# Patient Record
Sex: FEMALE | Race: WHITE | NOT HISPANIC OR LATINO | Employment: OTHER | URBAN - METROPOLITAN AREA
[De-identification: names, ages, dates, MRNs, and addresses within clinical notes are randomized per-mention and may not be internally consistent; named-entity substitution may affect disease eponyms.]

---

## 2024-07-03 ENCOUNTER — TRANSFERRED RECORDS (OUTPATIENT)
Dept: HEALTH INFORMATION MANAGEMENT | Facility: CLINIC | Age: 74
End: 2024-07-03
Payer: MEDICARE

## 2024-07-03 LAB
ALT SERPL-CCNC: 16 INTERNATIONAL_UNITS/L (ref 7–52)
AST SERPL-CCNC: 13 INTERNATIONAL_UNITS/L (ref 13–39)
CREATININE (EXTERNAL): 0.78 MG/DL (ref 0.6–1.2)
GLUCOSE (EXTERNAL): 449 MG/DL (ref 70–105)
INR (EXTERNAL): 1.2 (ref 0.8–1.1)
POTASSIUM (EXTERNAL): 4 MEQ/L (ref 3.5–5.1)

## 2024-07-20 ENCOUNTER — TRANSFERRED RECORDS (OUTPATIENT)
Dept: HEALTH INFORMATION MANAGEMENT | Facility: CLINIC | Age: 74
End: 2024-07-20
Payer: MEDICARE

## 2024-07-21 ENCOUNTER — APPOINTMENT (OUTPATIENT)
Dept: INTERVENTIONAL RADIOLOGY/VASCULAR | Facility: CLINIC | Age: 74
DRG: 271 | End: 2024-07-21
Attending: SURGERY
Payer: MEDICARE

## 2024-07-21 ENCOUNTER — ANESTHESIA EVENT (OUTPATIENT)
Dept: SURGERY | Facility: CLINIC | Age: 74
DRG: 271 | End: 2024-07-21
Payer: MEDICARE

## 2024-07-21 ENCOUNTER — HOSPITAL ENCOUNTER (INPATIENT)
Facility: CLINIC | Age: 74
LOS: 9 days | Discharge: NURSING, REHAB OR LONG-TERM CARE | DRG: 271 | End: 2024-07-30
Attending: SURGERY | Admitting: SURGERY
Payer: MEDICARE

## 2024-07-21 ENCOUNTER — MEDICAL CORRESPONDENCE (OUTPATIENT)
Dept: HEALTH INFORMATION MANAGEMENT | Facility: CLINIC | Age: 74
End: 2024-07-21

## 2024-07-21 ENCOUNTER — ANESTHESIA (OUTPATIENT)
Dept: SURGERY | Facility: CLINIC | Age: 74
DRG: 271 | End: 2024-07-21
Payer: MEDICARE

## 2024-07-21 DIAGNOSIS — Z98.890 H/O FASCIOTOMY: Primary | ICD-10-CM

## 2024-07-21 DIAGNOSIS — I74.09: ICD-10-CM

## 2024-07-21 DIAGNOSIS — D62 ANEMIA DUE TO BLOOD LOSS, ACUTE: ICD-10-CM

## 2024-07-21 LAB
ABO/RH(D): NORMAL
ACT BLD: 211 SECONDS (ref 74–150)
ACT BLD: 224 SECONDS (ref 74–150)
ACT BLD: 228 SECONDS (ref 74–150)
ACT BLD: 236 SECONDS (ref 74–150)
ACT BLD: 257 SECONDS (ref 74–150)
ACT BLD: 291 SECONDS (ref 74–150)
ALBUMIN SERPL BCG-MCNC: 2.9 G/DL (ref 3.5–5.2)
ALBUMIN SERPL BCG-MCNC: 3.1 G/DL (ref 3.5–5.2)
ALBUMIN UR-MCNC: 100 MG/DL
ALP SERPL-CCNC: 120 U/L (ref 40–150)
ALP SERPL-CCNC: 131 U/L (ref 40–150)
ALT SERPL W P-5'-P-CCNC: 16 U/L (ref 0–50)
ALT SERPL W P-5'-P-CCNC: 18 U/L (ref 0–50)
ANION GAP SERPL CALCULATED.3IONS-SCNC: 14 MMOL/L (ref 7–15)
ANION GAP SERPL CALCULATED.3IONS-SCNC: 9 MMOL/L (ref 7–15)
ANTIBODY SCREEN: NEGATIVE
APPEARANCE UR: ABNORMAL
APTT PPP: 176 SECONDS (ref 22–38)
APTT PPP: 79 SECONDS (ref 22–38)
APTT PPP: 81 SECONDS (ref 22–38)
AST SERPL W P-5'-P-CCNC: 17 U/L (ref 0–45)
AST SERPL W P-5'-P-CCNC: 24 U/L (ref 0–45)
BASE EXCESS BLDV CALC-SCNC: -5.1 MMOL/L (ref -3–3)
BILIRUB DIRECT SERPL-MCNC: <0.2 MG/DL (ref 0–0.3)
BILIRUB SERPL-MCNC: 0.2 MG/DL
BILIRUB SERPL-MCNC: 0.3 MG/DL
BILIRUB UR QL STRIP: NEGATIVE
BUN SERPL-MCNC: 11.2 MG/DL (ref 8–23)
BUN SERPL-MCNC: 7.4 MG/DL (ref 8–23)
CALCIUM SERPL-MCNC: 7.6 MG/DL (ref 8.8–10.4)
CALCIUM SERPL-MCNC: 8.2 MG/DL (ref 8.8–10.4)
CHLORIDE SERPL-SCNC: 97 MMOL/L (ref 98–107)
CHLORIDE SERPL-SCNC: 98 MMOL/L (ref 98–107)
COLOR UR AUTO: ABNORMAL
CREAT SERPL-MCNC: 0.59 MG/DL (ref 0.51–0.95)
CREAT SERPL-MCNC: 0.62 MG/DL (ref 0.51–0.95)
EGFRCR SERPLBLD CKD-EPI 2021: >90 ML/MIN/1.73M2
EGFRCR SERPLBLD CKD-EPI 2021: >90 ML/MIN/1.73M2
ERYTHROCYTE [DISTWIDTH] IN BLOOD BY AUTOMATED COUNT: 13.1 % (ref 10–15)
ERYTHROCYTE [DISTWIDTH] IN BLOOD BY AUTOMATED COUNT: 13.2 % (ref 10–15)
FIBRINOGEN PPP-MCNC: 380 MG/DL (ref 170–490)
GLUCOSE BLDC GLUCOMTR-MCNC: 143 MG/DL (ref 70–99)
GLUCOSE BLDC GLUCOMTR-MCNC: 256 MG/DL (ref 70–99)
GLUCOSE BLDC GLUCOMTR-MCNC: 289 MG/DL (ref 70–99)
GLUCOSE SERPL-MCNC: 148 MG/DL (ref 70–99)
GLUCOSE SERPL-MCNC: 280 MG/DL (ref 70–99)
GLUCOSE UR STRIP-MCNC: 500 MG/DL
HBA1C MFR BLD: 10.9 %
HCO3 BLDV-SCNC: 22 MMOL/L (ref 21–28)
HCO3 SERPL-SCNC: 22 MMOL/L (ref 22–29)
HCO3 SERPL-SCNC: 27 MMOL/L (ref 22–29)
HCT VFR BLD AUTO: 26.8 % (ref 35–47)
HCT VFR BLD AUTO: 27.5 % (ref 35–47)
HGB BLD-MCNC: 9.1 G/DL (ref 11.7–15.7)
HGB BLD-MCNC: 9.1 G/DL (ref 11.7–15.7)
HGB UR QL STRIP: ABNORMAL
INR PPP: 7.11 (ref 0.85–1.15)
KETONES UR STRIP-MCNC: 60 MG/DL
LACTATE SERPL-SCNC: 1.1 MMOL/L (ref 0.7–2)
LEUKOCYTE ESTERASE UR QL STRIP: ABNORMAL
MCH RBC QN AUTO: 32.2 PG (ref 26.5–33)
MCH RBC QN AUTO: 34 PG (ref 26.5–33)
MCHC RBC AUTO-ENTMCNC: 33.1 G/DL (ref 31.5–36.5)
MCHC RBC AUTO-ENTMCNC: 34 G/DL (ref 31.5–36.5)
MCV RBC AUTO: 100 FL (ref 78–100)
MCV RBC AUTO: 97 FL (ref 78–100)
NITRATE UR QL: NEGATIVE
O2/TOTAL GAS SETTING VFR VENT: 0 %
OXYHGB MFR BLDV: 65 % (ref 70–75)
PCO2 BLDV: 46 MM HG (ref 40–50)
PH BLDV: 7.28 [PH] (ref 7.32–7.43)
PH UR STRIP: 6 [PH] (ref 5–7)
PLATELET # BLD AUTO: 660 10E3/UL (ref 150–450)
PLATELET # BLD AUTO: 673 10E3/UL (ref 150–450)
PO2 BLDV: 40 MM HG (ref 25–47)
POTASSIUM SERPL-SCNC: 3.6 MMOL/L (ref 3.4–5.3)
POTASSIUM SERPL-SCNC: 4.1 MMOL/L (ref 3.4–5.3)
PROT SERPL-MCNC: 5.8 G/DL (ref 6.4–8.3)
PROT SERPL-MCNC: 6.4 G/DL (ref 6.4–8.3)
RBC # BLD AUTO: 2.68 10E6/UL (ref 3.8–5.2)
RBC # BLD AUTO: 2.83 10E6/UL (ref 3.8–5.2)
RBC URINE: >182 /HPF
SAO2 % BLDV: 66 % (ref 70–75)
SODIUM SERPL-SCNC: 133 MMOL/L (ref 135–145)
SODIUM SERPL-SCNC: 134 MMOL/L (ref 135–145)
SP GR UR STRIP: 1.01 (ref 1–1.03)
SPECIMEN EXPIRATION DATE: NORMAL
UROBILINOGEN UR STRIP-MCNC: NORMAL MG/DL
WBC # BLD AUTO: 10.4 10E3/UL (ref 4–11)
WBC # BLD AUTO: 21.2 10E3/UL (ref 4–11)
WBC URINE: >182 /HPF

## 2024-07-21 PROCEDURE — C1894 INTRO/SHEATH, NON-LASER: HCPCS | Performed by: SURGERY

## 2024-07-21 PROCEDURE — 87086 URINE CULTURE/COLONY COUNT: CPT

## 2024-07-21 PROCEDURE — 250N000011 HC RX IP 250 OP 636: Performed by: NURSE ANESTHETIST, CERTIFIED REGISTERED

## 2024-07-21 PROCEDURE — 83036 HEMOGLOBIN GLYCOSYLATED A1C: CPT

## 2024-07-21 PROCEDURE — 88311 DECALCIFY TISSUE: CPT | Mod: TC | Performed by: SURGERY

## 2024-07-21 PROCEDURE — 04CL0ZZ EXTIRPATION OF MATTER FROM LEFT FEMORAL ARTERY, OPEN APPROACH: ICD-10-PCS | Performed by: SURGERY

## 2024-07-21 PROCEDURE — 88304 TISSUE EXAM BY PATHOLOGIST: CPT | Mod: TC | Performed by: SURGERY

## 2024-07-21 PROCEDURE — 250N000011 HC RX IP 250 OP 636: Mod: JZ

## 2024-07-21 PROCEDURE — 99140 ANES COMP EMERGENCY COND: CPT | Performed by: NURSE ANESTHETIST, CERTIFIED REGISTERED

## 2024-07-21 PROCEDURE — 82805 BLOOD GASES W/O2 SATURATION: CPT

## 2024-07-21 PROCEDURE — 04CC0ZZ EXTIRPATION OF MATTER FROM RIGHT COMMON ILIAC ARTERY, OPEN APPROACH: ICD-10-PCS | Performed by: SURGERY

## 2024-07-21 PROCEDURE — 0KNT0ZZ RELEASE LEFT LOWER LEG MUSCLE, OPEN APPROACH: ICD-10-PCS | Performed by: SURGERY

## 2024-07-21 PROCEDURE — 93005 ELECTROCARDIOGRAM TRACING: CPT

## 2024-07-21 PROCEDURE — 250N000009 HC RX 250: Performed by: ANESTHESIOLOGY

## 2024-07-21 PROCEDURE — 258N000003 HC RX IP 258 OP 636: Performed by: NURSE ANESTHETIST, CERTIFIED REGISTERED

## 2024-07-21 PROCEDURE — 250N000011 HC RX IP 250 OP 636: Performed by: ANESTHESIOLOGY

## 2024-07-21 PROCEDURE — 27602 DECOMPRESSION OF LOWER LEG: CPT | Mod: LT | Performed by: SURGERY

## 2024-07-21 PROCEDURE — 34201 REMOVAL OF ARTERY CLOT: CPT | Mod: 50 | Performed by: SURGERY

## 2024-07-21 PROCEDURE — 255N000002 HC RX 255 OP 636: Performed by: SURGERY

## 2024-07-21 PROCEDURE — 04CD0ZZ EXTIRPATION OF MATTER FROM LEFT COMMON ILIAC ARTERY, OPEN APPROACH: ICD-10-PCS | Performed by: SURGERY

## 2024-07-21 PROCEDURE — 200N000001 HC R&B ICU

## 2024-07-21 PROCEDURE — 34201 REMOVAL OF ARTERY CLOT: CPT | Performed by: ANESTHESIOLOGY

## 2024-07-21 PROCEDURE — 250N000011 HC RX IP 250 OP 636

## 2024-07-21 PROCEDURE — 85384 FIBRINOGEN ACTIVITY: CPT

## 2024-07-21 PROCEDURE — 83605 ASSAY OF LACTIC ACID: CPT

## 2024-07-21 PROCEDURE — C1757 CATH, THROMBECTOMY/EMBOLECT: HCPCS | Performed by: SURGERY

## 2024-07-21 PROCEDURE — 250N000025 HC SEVOFLURANE, PER MIN: Performed by: SURGERY

## 2024-07-21 PROCEDURE — 34201 REMOVAL OF ARTERY CLOT: CPT | Performed by: NURSE ANESTHETIST, CERTIFIED REGISTERED

## 2024-07-21 PROCEDURE — 250N000011 HC RX IP 250 OP 636: Performed by: SURGERY

## 2024-07-21 PROCEDURE — 04CN0ZZ EXTIRPATION OF MATTER FROM LEFT POPLITEAL ARTERY, OPEN APPROACH: ICD-10-PCS | Performed by: SURGERY

## 2024-07-21 PROCEDURE — 80048 BASIC METABOLIC PNL TOTAL CA: CPT

## 2024-07-21 PROCEDURE — 370N000017 HC ANESTHESIA TECHNICAL FEE, PER MIN: Performed by: SURGERY

## 2024-07-21 PROCEDURE — 360N000077 HC SURGERY LEVEL 4, PER MIN: Performed by: SURGERY

## 2024-07-21 PROCEDURE — 272N000001 HC OR GENERAL SUPPLY STERILE: Performed by: SURGERY

## 2024-07-21 PROCEDURE — 34203 REMOVAL OF LEG ARTERY CLOT: CPT | Mod: LT | Performed by: SURGERY

## 2024-07-21 PROCEDURE — 258N000003 HC RX IP 258 OP 636

## 2024-07-21 PROCEDURE — 85610 PROTHROMBIN TIME: CPT

## 2024-07-21 PROCEDURE — 85027 COMPLETE CBC AUTOMATED: CPT

## 2024-07-21 PROCEDURE — 81001 URINALYSIS AUTO W/SCOPE: CPT

## 2024-07-21 PROCEDURE — 85347 COAGULATION TIME ACTIVATED: CPT

## 2024-07-21 PROCEDURE — 93010 ELECTROCARDIOGRAM REPORT: CPT | Performed by: INTERNAL MEDICINE

## 2024-07-21 PROCEDURE — 85730 THROMBOPLASTIN TIME PARTIAL: CPT

## 2024-07-21 PROCEDURE — 86900 BLOOD TYPING SEROLOGIC ABO: CPT

## 2024-07-21 PROCEDURE — 999N000083 IR OR ANGIOGRAM: Mod: TC

## 2024-07-21 PROCEDURE — 80053 COMPREHEN METABOLIC PANEL: CPT

## 2024-07-21 PROCEDURE — 85730 THROMBOPLASTIN TIME PARTIAL: CPT | Performed by: SURGERY

## 2024-07-21 PROCEDURE — 250N000009 HC RX 250: Performed by: NURSE ANESTHETIST, CERTIFIED REGISTERED

## 2024-07-21 PROCEDURE — B41G1ZZ FLUOROSCOPY OF LEFT LOWER EXTREMITY ARTERIES USING LOW OSMOLAR CONTRAST: ICD-10-PCS | Performed by: SURGERY

## 2024-07-21 PROCEDURE — 99100 ANES PT EXTEME AGE<1 YR&>70: CPT | Performed by: NURSE ANESTHETIST, CERTIFIED REGISTERED

## 2024-07-21 PROCEDURE — 36415 COLL VENOUS BLD VENIPUNCTURE: CPT

## 2024-07-21 PROCEDURE — 250N000009 HC RX 250: Performed by: SURGERY

## 2024-07-21 RX ORDER — CYANOCOBALAMIN (VITAMIN B-12) 500 MCG
400 LOZENGE ORAL DAILY
COMMUNITY

## 2024-07-21 RX ORDER — ASCORBIC ACID 500 MG
500 TABLET ORAL DAILY
COMMUNITY

## 2024-07-21 RX ORDER — NALOXONE HYDROCHLORIDE 0.4 MG/ML
0.4 INJECTION, SOLUTION INTRAMUSCULAR; INTRAVENOUS; SUBCUTANEOUS
Status: DISCONTINUED | OUTPATIENT
Start: 2024-07-21 | End: 2024-07-30 | Stop reason: HOSPADM

## 2024-07-21 RX ORDER — HYDROMORPHONE HCL IN WATER/PF 6 MG/30 ML
0.4 PATIENT CONTROLLED ANALGESIA SYRINGE INTRAVENOUS
Status: DISCONTINUED | OUTPATIENT
Start: 2024-07-21 | End: 2024-07-30 | Stop reason: HOSPADM

## 2024-07-21 RX ORDER — NALOXONE HYDROCHLORIDE 0.4 MG/ML
0.2 INJECTION, SOLUTION INTRAMUSCULAR; INTRAVENOUS; SUBCUTANEOUS
Status: DISCONTINUED | OUTPATIENT
Start: 2024-07-21 | End: 2024-07-30 | Stop reason: HOSPADM

## 2024-07-21 RX ORDER — BISACODYL 10 MG
10 SUPPOSITORY, RECTAL RECTAL DAILY PRN
Status: DISCONTINUED | OUTPATIENT
Start: 2024-07-24 | End: 2024-07-30 | Stop reason: HOSPADM

## 2024-07-21 RX ORDER — CALCIUM/MAGNESIUM/ZINC 333-133 MG
400 TABLET ORAL DAILY PRN
COMMUNITY

## 2024-07-21 RX ORDER — ACETAMINOPHEN 325 MG/1
975 TABLET ORAL EVERY 8 HOURS
Status: COMPLETED | OUTPATIENT
Start: 2024-07-21 | End: 2024-07-24

## 2024-07-21 RX ORDER — ARGATROBAN 1 MG/ML
10-40 INJECTION, SOLUTION INTRAVENOUS
Status: COMPLETED | OUTPATIENT
Start: 2024-07-21 | End: 2024-07-21

## 2024-07-21 RX ORDER — IOPAMIDOL 612 MG/ML
INJECTION, SOLUTION INTRAVASCULAR PRN
Status: DISCONTINUED | OUTPATIENT
Start: 2024-07-21 | End: 2024-07-21 | Stop reason: HOSPADM

## 2024-07-21 RX ORDER — SODIUM CHLORIDE 9 MG/ML
INJECTION, SOLUTION INTRAVENOUS CONTINUOUS PRN
Status: DISCONTINUED | OUTPATIENT
Start: 2024-07-21 | End: 2024-07-21

## 2024-07-21 RX ORDER — CEFTRIAXONE 1 G/1
1 INJECTION, POWDER, FOR SOLUTION INTRAMUSCULAR; INTRAVENOUS EVERY 24 HOURS
Status: DISCONTINUED | OUTPATIENT
Start: 2024-07-21 | End: 2024-07-30 | Stop reason: HOSPADM

## 2024-07-21 RX ORDER — FENTANYL CITRATE 50 UG/ML
INJECTION, SOLUTION INTRAMUSCULAR; INTRAVENOUS PRN
Status: DISCONTINUED | OUTPATIENT
Start: 2024-07-21 | End: 2024-07-21

## 2024-07-21 RX ORDER — NICOTINE POLACRILEX 4 MG
15-30 LOZENGE BUCCAL
Status: DISCONTINUED | OUTPATIENT
Start: 2024-07-21 | End: 2024-07-23

## 2024-07-21 RX ORDER — METHOCARBAMOL 500 MG/1
500 TABLET, FILM COATED ORAL EVERY 6 HOURS PRN
Status: DISCONTINUED | OUTPATIENT
Start: 2024-07-21 | End: 2024-07-30 | Stop reason: HOSPADM

## 2024-07-21 RX ORDER — OXYCODONE HYDROCHLORIDE 5 MG/1
10 TABLET ORAL EVERY 4 HOURS PRN
Status: DISCONTINUED | OUTPATIENT
Start: 2024-07-21 | End: 2024-07-30 | Stop reason: HOSPADM

## 2024-07-21 RX ORDER — CEFAZOLIN SODIUM/WATER 2 G/20 ML
SYRINGE (ML) INTRAVENOUS PRN
Status: DISCONTINUED | OUTPATIENT
Start: 2024-07-21 | End: 2024-07-21

## 2024-07-21 RX ORDER — LIDOCAINE 40 MG/G
CREAM TOPICAL
Status: DISCONTINUED | OUTPATIENT
Start: 2024-07-21 | End: 2024-07-24

## 2024-07-21 RX ORDER — MULTIVITAMIN
1 TABLET ORAL DAILY
COMMUNITY

## 2024-07-21 RX ORDER — ARGATROBAN 1 MG/ML
40 INJECTION, SOLUTION INTRAVENOUS ONCE
Status: DISCONTINUED | OUTPATIENT
Start: 2024-07-21 | End: 2024-07-21

## 2024-07-21 RX ORDER — ONDANSETRON 2 MG/ML
4 INJECTION INTRAMUSCULAR; INTRAVENOUS EVERY 6 HOURS PRN
Status: DISCONTINUED | OUTPATIENT
Start: 2024-07-21 | End: 2024-07-30 | Stop reason: HOSPADM

## 2024-07-21 RX ORDER — MAGNESIUM HYDROXIDE 1200 MG/15ML
LIQUID ORAL PRN
Status: DISCONTINUED | OUTPATIENT
Start: 2024-07-21 | End: 2024-07-21 | Stop reason: HOSPADM

## 2024-07-21 RX ORDER — METFORMIN HYDROCHLORIDE 750 MG/1
750 TABLET, EXTENDED RELEASE ORAL 2 TIMES DAILY
COMMUNITY

## 2024-07-21 RX ORDER — AMOXICILLIN 250 MG
1 CAPSULE ORAL 2 TIMES DAILY
Status: DISCONTINUED | OUTPATIENT
Start: 2024-07-21 | End: 2024-07-30 | Stop reason: HOSPADM

## 2024-07-21 RX ORDER — CARBOXYMETHYLCELLULOSE SODIUM 5 MG/ML
1 SOLUTION/ DROPS OPHTHALMIC 2 TIMES DAILY
COMMUNITY

## 2024-07-21 RX ORDER — DIAPER,BRIEF,ADULT, DISPOSABLE
2400 EACH MISCELLANEOUS DAILY
COMMUNITY

## 2024-07-21 RX ORDER — PANTOPRAZOLE SODIUM 40 MG/1
40 TABLET, DELAYED RELEASE ORAL
Status: DISCONTINUED | OUTPATIENT
Start: 2024-07-22 | End: 2024-07-30 | Stop reason: HOSPADM

## 2024-07-21 RX ORDER — OXYCODONE HYDROCHLORIDE 5 MG/1
5 TABLET ORAL EVERY 4 HOURS PRN
Status: DISCONTINUED | OUTPATIENT
Start: 2024-07-21 | End: 2024-07-30 | Stop reason: HOSPADM

## 2024-07-21 RX ORDER — CEFAZOLIN SODIUM 1 G/3ML
1 INJECTION, POWDER, FOR SOLUTION INTRAMUSCULAR; INTRAVENOUS EVERY 8 HOURS
Status: CANCELLED | OUTPATIENT
Start: 2024-07-21 | End: 2024-07-22

## 2024-07-21 RX ORDER — SODIUM CHLORIDE, SODIUM LACTATE, POTASSIUM CHLORIDE, CALCIUM CHLORIDE 600; 310; 30; 20 MG/100ML; MG/100ML; MG/100ML; MG/100ML
INJECTION, SOLUTION INTRAVENOUS CONTINUOUS PRN
Status: DISCONTINUED | OUTPATIENT
Start: 2024-07-21 | End: 2024-07-21

## 2024-07-21 RX ORDER — HYDROMORPHONE HCL IN WATER/PF 6 MG/30 ML
0.2 PATIENT CONTROLLED ANALGESIA SYRINGE INTRAVENOUS
Status: DISCONTINUED | OUTPATIENT
Start: 2024-07-21 | End: 2024-07-30 | Stop reason: HOSPADM

## 2024-07-21 RX ORDER — PROCHLORPERAZINE MALEATE 5 MG
5 TABLET ORAL EVERY 6 HOURS PRN
Status: DISCONTINUED | OUTPATIENT
Start: 2024-07-21 | End: 2024-07-29

## 2024-07-21 RX ORDER — ONDANSETRON 4 MG/1
4 TABLET, ORALLY DISINTEGRATING ORAL EVERY 6 HOURS PRN
Status: DISCONTINUED | OUTPATIENT
Start: 2024-07-21 | End: 2024-07-30 | Stop reason: HOSPADM

## 2024-07-21 RX ORDER — TRAZODONE HYDROCHLORIDE 150 MG/1
75 TABLET ORAL
COMMUNITY

## 2024-07-21 RX ORDER — LIDOCAINE HYDROCHLORIDE 20 MG/ML
INJECTION, SOLUTION INFILTRATION; PERINEURAL PRN
Status: DISCONTINUED | OUTPATIENT
Start: 2024-07-21 | End: 2024-07-21

## 2024-07-21 RX ORDER — ACETAMINOPHEN 325 MG/1
650 TABLET ORAL EVERY 4 HOURS PRN
Status: DISCONTINUED | OUTPATIENT
Start: 2024-07-24 | End: 2024-07-30 | Stop reason: HOSPADM

## 2024-07-21 RX ORDER — PROPOFOL 10 MG/ML
INJECTION, EMULSION INTRAVENOUS PRN
Status: DISCONTINUED | OUTPATIENT
Start: 2024-07-21 | End: 2024-07-21

## 2024-07-21 RX ORDER — POLYETHYLENE GLYCOL 3350 17 G/17G
17 POWDER, FOR SOLUTION ORAL DAILY
Status: DISCONTINUED | OUTPATIENT
Start: 2024-07-22 | End: 2024-07-30 | Stop reason: HOSPADM

## 2024-07-21 RX ORDER — SODIUM CHLORIDE, SODIUM LACTATE, POTASSIUM CHLORIDE, CALCIUM CHLORIDE 600; 310; 30; 20 MG/100ML; MG/100ML; MG/100ML; MG/100ML
INJECTION, SOLUTION INTRAVENOUS CONTINUOUS
Status: DISCONTINUED | OUTPATIENT
Start: 2024-07-21 | End: 2024-07-26

## 2024-07-21 RX ORDER — LACTOBACILLUS RHAMNOSUS GG 10B CELL
1 CAPSULE ORAL DAILY
COMMUNITY

## 2024-07-21 RX ORDER — SODIUM CHLORIDE 9 MG/ML
INJECTION, SOLUTION INTRAVENOUS CONTINUOUS
Status: DISCONTINUED | OUTPATIENT
Start: 2024-07-21 | End: 2024-07-22

## 2024-07-21 RX ORDER — DEXTROSE MONOHYDRATE 25 G/50ML
25-50 INJECTION, SOLUTION INTRAVENOUS
Status: DISCONTINUED | OUTPATIENT
Start: 2024-07-21 | End: 2024-07-23

## 2024-07-21 RX ORDER — HYDROMORPHONE HYDROCHLORIDE 1 MG/ML
INJECTION, SOLUTION INTRAMUSCULAR; INTRAVENOUS; SUBCUTANEOUS PRN
Status: DISCONTINUED | OUTPATIENT
Start: 2024-07-21 | End: 2024-07-21

## 2024-07-21 RX ORDER — CALCIUM CARBONATE 500 MG/1
500 TABLET, CHEWABLE ORAL 4 TIMES DAILY PRN
Status: DISCONTINUED | OUTPATIENT
Start: 2024-07-21 | End: 2024-07-30 | Stop reason: HOSPADM

## 2024-07-21 RX ORDER — ASPIRIN 81 MG/1
81 TABLET ORAL DAILY
Status: DISCONTINUED | OUTPATIENT
Start: 2024-07-22 | End: 2024-07-30 | Stop reason: HOSPADM

## 2024-07-21 RX ADMIN — Medication 2 G: at 13:35

## 2024-07-21 RX ADMIN — ARGATROBAN 1860 MCG: 1 INJECTION, SOLUTION INTRAVENOUS at 14:53

## 2024-07-21 RX ADMIN — SODIUM CHLORIDE, POTASSIUM CHLORIDE, SODIUM LACTATE AND CALCIUM CHLORIDE 500 ML: 600; 310; 30; 20 INJECTION, SOLUTION INTRAVENOUS at 12:22

## 2024-07-21 RX ADMIN — HYDROMORPHONE HYDROCHLORIDE 0.5 MG: 1 INJECTION, SOLUTION INTRAMUSCULAR; INTRAVENOUS; SUBCUTANEOUS at 18:28

## 2024-07-21 RX ADMIN — FENTANYL CITRATE 50 MCG: 50 INJECTION INTRAMUSCULAR; INTRAVENOUS at 14:21

## 2024-07-21 RX ADMIN — SODIUM CHLORIDE: 9 INJECTION, SOLUTION INTRAVENOUS at 13:39

## 2024-07-21 RX ADMIN — SUGAMMADEX 200 MG: 100 INJECTION, SOLUTION INTRAVENOUS at 18:48

## 2024-07-21 RX ADMIN — SODIUM CHLORIDE: 9 INJECTION, SOLUTION INTRAVENOUS at 21:11

## 2024-07-21 RX ADMIN — SODIUM CHLORIDE, POTASSIUM CHLORIDE, SODIUM LACTATE AND CALCIUM CHLORIDE: 600; 310; 30; 20 INJECTION, SOLUTION INTRAVENOUS at 13:39

## 2024-07-21 RX ADMIN — PHENYLEPHRINE HYDROCHLORIDE 50 MCG: 10 INJECTION INTRAVENOUS at 17:35

## 2024-07-21 RX ADMIN — ARGATROBAN 1 MCG/KG/MIN: 50 INJECTION, SOLUTION INTRAVENOUS at 13:55

## 2024-07-21 RX ADMIN — PHENYLEPHRINE HYDROCHLORIDE 50 MCG: 10 INJECTION INTRAVENOUS at 14:33

## 2024-07-21 RX ADMIN — PHENYLEPHRINE HYDROCHLORIDE 50 MCG: 10 INJECTION INTRAVENOUS at 18:01

## 2024-07-21 RX ADMIN — FENTANYL CITRATE 50 MCG: 50 INJECTION INTRAMUSCULAR; INTRAVENOUS at 13:31

## 2024-07-21 RX ADMIN — CEFTRIAXONE SODIUM 1 G: 1 INJECTION, POWDER, FOR SOLUTION INTRAMUSCULAR; INTRAVENOUS at 21:00

## 2024-07-21 RX ADMIN — ARGATROBAN 5 MCG/KG/MIN: 50 INJECTION, SOLUTION INTRAVENOUS at 21:24

## 2024-07-21 RX ADMIN — ARGATROBAN 1860 MCG: 1 INJECTION, SOLUTION INTRAVENOUS at 13:56

## 2024-07-21 RX ADMIN — ARGATROBAN 1860 MCG: 1 INJECTION, SOLUTION INTRAVENOUS at 16:28

## 2024-07-21 RX ADMIN — PHENYLEPHRINE HYDROCHLORIDE 50 MCG: 10 INJECTION INTRAVENOUS at 18:19

## 2024-07-21 RX ADMIN — ROCURONIUM BROMIDE 20 MG: 50 INJECTION, SOLUTION INTRAVENOUS at 17:15

## 2024-07-21 RX ADMIN — ROCURONIUM BROMIDE 30 MG: 50 INJECTION, SOLUTION INTRAVENOUS at 15:47

## 2024-07-21 RX ADMIN — PROPOFOL 40 MG: 10 INJECTION, EMULSION INTRAVENOUS at 15:55

## 2024-07-21 RX ADMIN — PHENYLEPHRINE HYDROCHLORIDE 50 MCG: 10 INJECTION INTRAVENOUS at 15:11

## 2024-07-21 RX ADMIN — ROCURONIUM BROMIDE 20 MG: 50 INJECTION, SOLUTION INTRAVENOUS at 15:04

## 2024-07-21 RX ADMIN — ROCURONIUM BROMIDE 50 MG: 50 INJECTION, SOLUTION INTRAVENOUS at 13:31

## 2024-07-21 RX ADMIN — HYDROMORPHONE HYDROCHLORIDE 0.2 MG: 0.2 INJECTION, SOLUTION INTRAMUSCULAR; INTRAVENOUS; SUBCUTANEOUS at 22:45

## 2024-07-21 RX ADMIN — PHENYLEPHRINE HYDROCHLORIDE 50 MCG: 10 INJECTION INTRAVENOUS at 16:36

## 2024-07-21 RX ADMIN — PROPOFOL 90 MG: 10 INJECTION, EMULSION INTRAVENOUS at 13:31

## 2024-07-21 RX ADMIN — LIDOCAINE HYDROCHLORIDE 100 MG: 20 INJECTION, SOLUTION INFILTRATION; PERINEURAL at 13:31

## 2024-07-21 RX ADMIN — SODIUM CHLORIDE, POTASSIUM CHLORIDE, SODIUM LACTATE AND CALCIUM CHLORIDE: 600; 310; 30; 20 INJECTION, SOLUTION INTRAVENOUS at 13:20

## 2024-07-21 RX ADMIN — ROCURONIUM BROMIDE 20 MG: 50 INJECTION, SOLUTION INTRAVENOUS at 14:21

## 2024-07-21 RX ADMIN — ARGATROBAN 1860 MCG: 1 INJECTION, SOLUTION INTRAVENOUS at 17:51

## 2024-07-21 RX ADMIN — SODIUM CHLORIDE, POTASSIUM CHLORIDE, SODIUM LACTATE AND CALCIUM CHLORIDE: 600; 310; 30; 20 INJECTION, SOLUTION INTRAVENOUS at 19:10

## 2024-07-21 ASSESSMENT — ACTIVITIES OF DAILY LIVING (ADL)
ADLS_ACUITY_SCORE: 36
ADLS_ACUITY_SCORE: 44
ADLS_ACUITY_SCORE: 36
ADLS_ACUITY_SCORE: 36
ADLS_ACUITY_SCORE: 49
ADLS_ACUITY_SCORE: 35
ADLS_ACUITY_SCORE: 36

## 2024-07-21 ASSESSMENT — COPD QUESTIONNAIRES: COPD: 0

## 2024-07-21 NOTE — ANESTHESIA PROCEDURE NOTES
Airway       Patient location during procedure: OR       Procedure Start/Stop Times: 7/21/2024 1:35 PM  Staff -        CRNA: David Dial APRN CRNA       Performed By: CRNA  Consent for Airway        Urgency: elective  Indications and Patient Condition       Indications for airway management: juan josé-procedural       Induction type:intravenous       Mask difficulty assessment: 2 - vent by mask + OA or adjuvant +/- NMBA    Final Airway Details       Final airway type: endotracheal airway       Successful airway: ETT - single  Endotracheal Airway Details        ETT size (mm): 7.0       Cuffed: yes       Successful intubation technique: video laryngoscopy       VL Blade Size: Brannon 3       Grade View of Cords: 1       Adjucts: stylet       Bite block used: None    Post intubation assessment        Placement verified by: capnometry, equal breath sounds and chest rise        Number of attempts at approach: 1       Secured with: tape       Ease of procedure: easy       Dentition: Intact and Unchanged    Medication(s) Administered   Medication Administration Time: 7/21/2024 1:35 PM

## 2024-07-21 NOTE — H&P
Vascular Surgery Consult  2024    Lila Jenkins  : 1950    Date of Service: 2024 8:46 AM    Reason for admission: Occlusion infrarenal aorta with distal embolus to LLE tibial vessels with Bety 2B ischemia    Assessment and Plan:  Lila Jenkins is a 74 year old female with PMH Of T2DM on home insulin, HTN, recent UTI w/ nephrolithiasis, h/o tobacco abuse (30 pack year),  who presented to outside hospital with acute onset LLE numbness and pain.  On exam she has no Doppler signals in her left lower extremity with exception of monophasic popliteal artery and has diminished sensation on left foot compared with right, inability to wiggle toes left foot, diminished dorsiflexion compared to right as well. CTA was performed which demonstrated occlusion of her infrarenal aorta with occlusion of R SAEED proximally as well, reconstituion of R SAEED and flow in line to R foot with 3 vessel runoff. LLE with occlusion noted in below knee popliteal artery with scattered runoff seen to the LLE, no inline flow to the foot.   She has bety 2B acute limb ischemia resulting from acute aortic occlusion with distal embolization to LLE tibial vessels. Will plan for emergent embolectomies given threatened limb. Discussed risks/benefits with patient and her daughter, Noble, at bedside and they were agreeable to proceed.      - Taken emergently to OR for aortic embolectomy with LLE embolectomy and LLE fasciotomies  -Allergy to sodium pentosan per patient, this is in heparin thus patient is unable to receive heparin, will use argatroban instead. -Will continue on abx for UTI, urology consult given pt with issues with nephrolithiasis and hydronephrosis which was planned for procedure Wednesday of this week with urologists in home area of [place], [place]    Discussed with Dr. Ochoa, who is in agreement with the above    Alexis Brown MD  Surgery PGY4    History of Present Illness:    Lila Jenkins is a  74  year old female with PMH Of T2DM on home insulin, HTN, recent UTI w/ nephrolithiasis, h/o tobacco abuse (30 pack year),  who presented to outside hospital with acute onset LLE numbness and pain.  She states this began yesterday around 10 AM.  First she noted numbness that was intermittent however it then became constant and she also developed pain.  It is continued to worsen now.   Also reports elevated blood sugar at home prompting presentation.     She has never had anything like this happen before.  She is not currently on any blood thinners although does report she takes ginkgo Bilbo[place].  Does not take a baby aspirin.     Does note a 4 to 5-month history of calf pain when walking in left greater than right.  Denies rest pain.  Reports a few episodes when walking where she would develop numbness in her bilateral hips however these episodes were all self-limited.     She lives independently at home and is independent in all of her own activities of daily living.     She does note that she has been having issues with UTIs for the past 3 weeks.  Per patient and daughter she has kidney stones and was planned for procedure with urology this Wednesday in [facility].  Patient also tells me that she was on antibiotics for her UTI, however stopped taking them as they made her very nauseous and made her vomit.  Tells me she stopped when she had 3 pills left.     On review of chart from hospital in [place], patient did have UA with positive leukocyte Esterase, and CT report notes fat stranding about kidney.     Patient denies recent weight loss, no knowledge of any clotting disorders.  Does report having some chills recently that were so severe that she would wake up at night from them.  This happened she says 1-2 times a week.  no fevers that she is aware of.     Past Medical History:  No past medical history on file.    Past Surgical History  Several orthopedic surgeries for bone spurs in L elbow, L knee, R shoulder,  as well as several others, never surgery in abdomen or in groins.     Family History:  Family history of several grandparents being on blood thinners    Social History:  Does not drink alcohol   Smoked for 30 years, quit 11 years ago.  Lives at home independently      Medications:  No current outpatient medications on file.       Allergies:     Allergies   Allergen Reactions    Pentosan Polysulfate        Review of Symptoms:  A 10 point review of symptoms has been conducted and is negative except for that mentioned in the above HPI.    Physical Exam:    Blood pressure 125/56, pulse 91, temperature (!) 96.3  F (35.7  C), temperature source Axillary, resp. rate 28, height 1.524 m (5'), weight 46.5 kg (102 lb 8.2 oz), SpO2 99%.  Gen:    Lying in bed in NAD, A&OX3  HEENT: Normocephalic and atraumatic  CV:  RRR  Pulm:  Non-labored breathing on RA  Abd:  Soft, non-tender, non-distended  Ext:  LLE cool to touch. RLE warm to touch  Vascular:  Nonpalpable pulses in b/l femoral arteries. LLE with monophasic popliteal artery signal, no doppler signals in DP and PT. RLE with monophasic DP signal.    Neuro:  RLE with motor function.     Labs:  Reviewed labs from OSH, notable for leukocytosis at 17, normal creatinine.    UA was positive for leukocyte Estrace.       Imaging:  CT reviewed from outside hospital.  Notable for occlusion with what appears to be thrombus in the infrarenal aorta, this area notably also has calcification suggesting some degree of chronic disease.  This thrombus extends into origin of right common iliac artery for short distance.  No other thrombus visualized in the right lower extremity vasculature.   Left lower extremity with patent common and external iliac arteries as well as common femoral profunda SFA and above-knee popliteal artery.  Vessel becomes diminutive below the knee with no origins of tibial vessels visualized tibial runoff does return to mid calf however again vessels not visualized  distally below the level of the mid calf with no flow visualized to the foot.

## 2024-07-21 NOTE — ANESTHESIA PROCEDURE NOTES
Central Line/PA Catheter Placement    Pre-Procedure   Staff -        Anesthesiologist:  Shannan Terna MD       Performed By: anesthesiologist       Location: OR       Pre-Anesthestic Checklist: patient identified, IV checked, site marked, risks and benefits discussed, informed consent, monitors and equipment checked, pre-op evaluation and at physician/surgeon's request  Timeout:       Correct Patient: Yes        Correct Procedure: Yes        Correct Site: Yes        Correct Position: Yes        Correct Laterality: Yes   Line Placement:   This line was placed Post Induction    Procedure   Procedure: central line       Laterality: right       Insertion Site: internal jugular.       Patient Position: Trendelenburg  Sterile Prep        All elements of maximal sterile barrier technique followed       Patient Prep/Sterile Barriers: draped, hand hygiene, gloves , hat , mask , draped, gown, sterile gel and probe cover       Skin prep: Chloraprep  Insertion/Injection        Technique: ultrasound guided        1. Ultrasound was used to evaluate the access site.       2. Vein evaluated via ultrasound for patency/adequacy.       3. Using real-time ultrasound the needle/catheter was observed entering the artery/vein.       Introducer Type: 9 Fr, 2-lumen MAC        Type: Introducer  Narrative         Secured by: suture       Tegaderm and Biopatch dressing used.       Complications: None apparent,        blood aspirated from all lumens,        All lumens flushed: Yes       Verification method: Placement to be verified post-op   Comments:  No image saved.

## 2024-07-21 NOTE — ANESTHESIA PROCEDURE NOTES
Arterial Line Procedure Note    Pre-Procedure   Staff -        Anesthesiologist:  Shannan Teran MD       Performed By: anesthesiologist       Location: pre-op       Pre-Anesthestic Checklist: patient identified, IV checked, risks and benefits discussed, informed consent, monitors and equipment checked, pre-op evaluation and at physician/surgeon's request  Timeout:       Correct Patient: Yes        Correct Procedure: Yes        Correct Site: Yes        Correct Position: Yes   Line Placement:   This line was placed Post Induction  Procedure   Procedure: arterial line and new line       Laterality: left       Insertion Site: brachial.  Sterile Prep        Standard elements of sterile barrier followed       Skin prep: Chloraprep  Insertion/Injection        Technique: ultrasound guided        1. Ultrasound was used to evaluate the access site.       2. Artery evaluated via ultrasound for patency/adequacy.       3. Using real-time ultrasound the needle/catheter was observed entering the artery/vein.       Catheter Type/Size: 20 G, 12 cm  Narrative         Secured by: anchor securement device       Tegaderm dressing used.       Complications: None apparent,        Arterial waveform: Yes        IBP within 10% of NIBP: Yes   Comments:  No image saved

## 2024-07-21 NOTE — LETTER
[facility] SURGERY  [address] [address]  Phone: [phone]  Fax: [phone]    July 29, 2024        Lila Jenkins  [address] [facility] [address]          To whom it may concern:    RE: Lila Jenkins    This patient is safe to fly on a commercial airline.     Please contact me for questions or concerns.      Sincerely,      Flavio Borden MD

## 2024-07-21 NOTE — ANESTHESIA PREPROCEDURE EVALUATION
Anesthesia Pre-Procedure Evaluation    Patient: Lila Jenkins   MRN: [id number] : 1950        Procedure : Procedure(s):  Bilateral Femoral Cut Down Aortic Amerron Embolectomy Left Lower Extremity, Possible Right Lower Extremity Embolectomy, Possible Left Leg Fasciotomies          No past medical history on file.   No past surgical history on file.   Not on File   Social History     Tobacco Use    Smoking status: Not on file    Smokeless tobacco: Not on file   Substance Use Topics    Alcohol use: Not on file      Wt Readings from Last 1 Encounters:   24 46.5 kg (102 lb 8.2 oz)        Anesthesia Evaluation   Pt has had prior anesthetic.     No history of anesthetic complications       ROS/MED HX  ENT/Pulmonary:    (-) asthma, COPD and sleep apnea   Neurologic:       Cardiovascular: Comment: Patient unsure      METS/Exercise Tolerance:     Hematologic:     (+) History of blood clots,               Musculoskeletal:       GI/Hepatic:    (-) GERD   Renal/Genitourinary:    (-) renal disease   Endo:     (+)  type II DM,   Using insulin,                 Psychiatric/Substance Use:       Infectious Disease:       Malignancy:       Other:            Physical Exam    Airway        Mallampati: II   TM distance: > 3 FB   Neck ROM: full   Mouth opening: > 3 cm    Respiratory Devices and Support         Dental       (+) Edentulous and Removable bridges or other hardware      Cardiovascular          Rhythm and rate: regular     Pulmonary           breath sounds clear to auscultation           OUTSIDE LABS:  CBC:   Lab Results   Component Value Date    WBC 10.4 2024    HGB 9.1 (L) 2024    HCT 27.5 (L) 2024     (H) 2024     BMP:   Lab Results   Component Value Date     (L) 2024    POTASSIUM 3.6 2024    CHLORIDE 97 (L) 2024    CO2 27 2024    BUN 7.4 (L) 2024    CR 0.62 2024     (H) 2024     (H) 2024     COAGS:   Lab Results  "  Component Value Date     (HH) 07/21/2024    INR 7.11 (HH) 07/21/2024    FIBR 380 07/21/2024     POC: No results found for: \"BGM\", \"HCG\", \"HCGS\"  HEPATIC:   Lab Results   Component Value Date    ALBUMIN 3.1 (L) 07/21/2024    PROTTOTAL 6.4 07/21/2024    ALT 18 07/21/2024    AST 17 07/21/2024    ALKPHOS 131 07/21/2024    BILITOTAL 0.3 07/21/2024     OTHER:   Lab Results   Component Value Date    LACT 1.1 07/21/2024    ARTURO 8.2 (L) 07/21/2024       Anesthesia Plan    ASA Status:  5, emergent    NPO Status:  NPO Appropriate    Anesthesia Type: General.     - Airway: ETT         Techniques and Equipment:     - Lines/Monitors: 2nd IV, Arterial Line, Central Line     Consents    Anesthesia Plan(s) and associated risks, benefits, and realistic alternatives discussed. Questions answered and patient/representative(s) expressed understanding.     - Discussed: Risks, Benefits and Alternatives for BOTH SEDATION and the PROCEDURE were discussed     - Discussed with:  Patient            Postoperative Care            Comments:    Other Comments: Patient comes emergently from [place], [place] d/t an infrarenal aortic thrombus with no pulses detected in either lower extremity. No records are with the patient. Allergies/medications unknown. Not seen by ICU/hospitalist prior to arrival to OR. Labs/TNS drawn & pending. 1 20G PIV in hand. A brief interview was done at bedside. Patient on bivalirudin d/t heparin allergy.    Due to poor access, will place second PIV and central line. Arterial line for BP monitoring and frequent lab draws. Per pharmacy, we do not have enough bivalirudin for the case, will switch to agatroban. Per vascular surgery, ACT > 300 for procedure. ACT to be drawn every 30 minutes.            Shannan Teran MD    I have reviewed the pertinent notes and labs in the chart from the past 30 days and (re)examined the patient.  Any updates or changes from those notes are reflected in this note.        "

## 2024-07-22 ENCOUNTER — APPOINTMENT (OUTPATIENT)
Dept: GENERAL RADIOLOGY | Facility: CLINIC | Age: 74
DRG: 271 | End: 2024-07-22
Attending: UROLOGY
Payer: MEDICARE

## 2024-07-22 ENCOUNTER — ANESTHESIA (OUTPATIENT)
Dept: SURGERY | Facility: CLINIC | Age: 74
DRG: 271 | End: 2024-07-22
Payer: MEDICARE

## 2024-07-22 ENCOUNTER — APPOINTMENT (OUTPATIENT)
Dept: CARDIOLOGY | Facility: CLINIC | Age: 74
DRG: 271 | End: 2024-07-22
Payer: MEDICARE

## 2024-07-22 ENCOUNTER — ANESTHESIA EVENT (OUTPATIENT)
Dept: SURGERY | Facility: CLINIC | Age: 74
DRG: 271 | End: 2024-07-22
Payer: MEDICARE

## 2024-07-22 ENCOUNTER — APPOINTMENT (OUTPATIENT)
Dept: CT IMAGING | Facility: CLINIC | Age: 74
DRG: 271 | End: 2024-07-22
Payer: MEDICARE

## 2024-07-22 LAB
ANION GAP SERPL CALCULATED.3IONS-SCNC: 9 MMOL/L (ref 7–15)
APTT PPP: 84 SECONDS (ref 22–38)
ATRIAL RATE - MUSE: 91 BPM
BASOPHILS # BLD AUTO: 0 10E3/UL (ref 0–0.2)
BASOPHILS NFR BLD AUTO: 0 %
BUN SERPL-MCNC: 9.7 MG/DL (ref 8–23)
CALCIUM SERPL-MCNC: 6.6 MG/DL (ref 8.8–10.4)
CHLORIDE SERPL-SCNC: 104 MMOL/L (ref 98–107)
CREAT SERPL-MCNC: 0.63 MG/DL (ref 0.51–0.95)
DIASTOLIC BLOOD PRESSURE - MUSE: NORMAL MMHG
EGFRCR SERPLBLD CKD-EPI 2021: >90 ML/MIN/1.73M2
EOSINOPHIL # BLD AUTO: 0 10E3/UL (ref 0–0.7)
EOSINOPHIL NFR BLD AUTO: 0 %
ERYTHROCYTE [DISTWIDTH] IN BLOOD BY AUTOMATED COUNT: 13.2 % (ref 10–15)
GLUCOSE BLDC GLUCOMTR-MCNC: 136 MG/DL (ref 70–99)
GLUCOSE BLDC GLUCOMTR-MCNC: 151 MG/DL (ref 70–99)
GLUCOSE BLDC GLUCOMTR-MCNC: 241 MG/DL (ref 70–99)
GLUCOSE BLDC GLUCOMTR-MCNC: 280 MG/DL (ref 70–99)
GLUCOSE BLDC GLUCOMTR-MCNC: 340 MG/DL (ref 70–99)
GLUCOSE BLDC GLUCOMTR-MCNC: 412 MG/DL (ref 70–99)
GLUCOSE BLDC GLUCOMTR-MCNC: 507 MG/DL (ref 70–99)
GLUCOSE SERPL-MCNC: 258 MG/DL (ref 70–99)
HCO3 SERPL-SCNC: 22 MMOL/L (ref 22–29)
HCT VFR BLD AUTO: 21.1 % (ref 35–47)
HGB BLD-MCNC: 7.2 G/DL (ref 11.7–15.7)
IMM GRANULOCYTES # BLD: 0.1 10E3/UL
IMM GRANULOCYTES NFR BLD: 1 %
INR PPP: 3.93 (ref 0.85–1.15)
INTERPRETATION ECG - MUSE: NORMAL
LVEF ECHO: NORMAL
LYMPHOCYTES # BLD AUTO: 1 10E3/UL (ref 0.8–5.3)
LYMPHOCYTES NFR BLD AUTO: 8 %
MAGNESIUM SERPL-MCNC: 1.3 MG/DL (ref 1.7–2.3)
MAGNESIUM SERPL-MCNC: 2.1 MG/DL (ref 1.7–2.3)
MCH RBC QN AUTO: 33.8 PG (ref 26.5–33)
MCHC RBC AUTO-ENTMCNC: 34.1 G/DL (ref 31.5–36.5)
MCV RBC AUTO: 99 FL (ref 78–100)
MONOCYTES # BLD AUTO: 0.9 10E3/UL (ref 0–1.3)
MONOCYTES NFR BLD AUTO: 7 %
NEUTROPHILS # BLD AUTO: 11.2 10E3/UL (ref 1.6–8.3)
NEUTROPHILS NFR BLD AUTO: 85 %
NRBC # BLD AUTO: 0 10E3/UL
NRBC BLD AUTO-RTO: 0 /100
P AXIS - MUSE: 81 DEGREES
PHOSPHATE SERPL-MCNC: 2.4 MG/DL (ref 2.5–4.5)
PLATELET # BLD AUTO: 529 10E3/UL (ref 150–450)
POTASSIUM SERPL-SCNC: 3.3 MMOL/L (ref 3.4–5.3)
POTASSIUM SERPL-SCNC: 3.9 MMOL/L (ref 3.4–5.3)
PR INTERVAL - MUSE: 122 MS
QRS DURATION - MUSE: 70 MS
QT - MUSE: 406 MS
QTC - MUSE: 499 MS
R AXIS - MUSE: 83 DEGREES
RBC # BLD AUTO: 2.13 10E6/UL (ref 3.8–5.2)
SODIUM SERPL-SCNC: 135 MMOL/L (ref 135–145)
SYSTOLIC BLOOD PRESSURE - MUSE: NORMAL MMHG
T AXIS - MUSE: 56 DEGREES
VENTRICULAR RATE- MUSE: 91 BPM
WBC # BLD AUTO: 13.3 10E3/UL (ref 4–11)

## 2024-07-22 PROCEDURE — 250N000011 HC RX IP 250 OP 636

## 2024-07-22 PROCEDURE — 85025 COMPLETE CBC W/AUTO DIFF WBC: CPT

## 2024-07-22 PROCEDURE — 360N000075 HC SURGERY LEVEL 2, PER MIN: Performed by: UROLOGY

## 2024-07-22 PROCEDURE — 258N000003 HC RX IP 258 OP 636: Performed by: ANESTHESIOLOGY

## 2024-07-22 PROCEDURE — 250N000025 HC SEVOFLURANE, PER MIN: Performed by: UROLOGY

## 2024-07-22 PROCEDURE — 250N000009 HC RX 250: Performed by: UROLOGY

## 2024-07-22 PROCEDURE — 85730 THROMBOPLASTIN TIME PARTIAL: CPT

## 2024-07-22 PROCEDURE — 250N000011 HC RX IP 250 OP 636: Mod: JZ

## 2024-07-22 PROCEDURE — 999N000208 ECHOCARDIOGRAM COMPLETE

## 2024-07-22 PROCEDURE — 258N000003 HC RX IP 258 OP 636

## 2024-07-22 PROCEDURE — 999N000141 HC STATISTIC PRE-PROCEDURE NURSING ASSESSMENT: Performed by: UROLOGY

## 2024-07-22 PROCEDURE — 250N000011 HC RX IP 250 OP 636: Performed by: SURGERY

## 2024-07-22 PROCEDURE — 370N000017 HC ANESTHESIA TECHNICAL FEE, PER MIN: Performed by: UROLOGY

## 2024-07-22 PROCEDURE — 710N000009 HC RECOVERY PHASE 1, LEVEL 1, PER MIN: Performed by: UROLOGY

## 2024-07-22 PROCEDURE — 52332 CYSTOSCOPY AND TREATMENT: CPT | Performed by: ANESTHESIOLOGY

## 2024-07-22 PROCEDURE — C1769 GUIDE WIRE: HCPCS | Performed by: UROLOGY

## 2024-07-22 PROCEDURE — 250N000009 HC RX 250: Performed by: SURGERY

## 2024-07-22 PROCEDURE — 258N000003 HC RX IP 258 OP 636: Performed by: INTERNAL MEDICINE

## 2024-07-22 PROCEDURE — 99100 ANES PT EXTEME AGE<1 YR&>70: CPT | Performed by: NURSE ANESTHETIST, CERTIFIED REGISTERED

## 2024-07-22 PROCEDURE — 272N000001 HC OR GENERAL SUPPLY STERILE: Performed by: UROLOGY

## 2024-07-22 PROCEDURE — 258N000003 HC RX IP 258 OP 636: Performed by: SURGERY

## 2024-07-22 PROCEDURE — 255N000002 HC RX 255 OP 636: Performed by: SURGERY

## 2024-07-22 PROCEDURE — 80048 BASIC METABOLIC PNL TOTAL CA: CPT

## 2024-07-22 PROCEDURE — 250N000011 HC RX IP 250 OP 636: Performed by: PHYSICIAN ASSISTANT

## 2024-07-22 PROCEDURE — 83735 ASSAY OF MAGNESIUM: CPT

## 2024-07-22 PROCEDURE — C2617 STENT, NON-COR, TEM W/O DEL: HCPCS | Performed by: UROLOGY

## 2024-07-22 PROCEDURE — 250N000012 HC RX MED GY IP 250 OP 636 PS 637

## 2024-07-22 PROCEDURE — 250N000011 HC RX IP 250 OP 636: Performed by: ANESTHESIOLOGY

## 2024-07-22 PROCEDURE — 120N000013 HC R&B IMCU

## 2024-07-22 PROCEDURE — 84132 ASSAY OF SERUM POTASSIUM: CPT | Performed by: SURGERY

## 2024-07-22 PROCEDURE — 84100 ASSAY OF PHOSPHORUS: CPT

## 2024-07-22 PROCEDURE — 93306 TTE W/DOPPLER COMPLETE: CPT | Mod: 26 | Performed by: INTERNAL MEDICINE

## 2024-07-22 PROCEDURE — 36415 COLL VENOUS BLD VENIPUNCTURE: CPT | Performed by: SURGERY

## 2024-07-22 PROCEDURE — L4396 STATIC OR DYNAMI AFO PRE CST: HCPCS

## 2024-07-22 PROCEDURE — BT1F1ZZ FLUOROSCOPY OF LEFT KIDNEY, URETER AND BLADDER USING LOW OSMOLAR CONTRAST: ICD-10-PCS | Performed by: UROLOGY

## 2024-07-22 PROCEDURE — 52332 CYSTOSCOPY AND TREATMENT: CPT | Performed by: NURSE ANESTHETIST, CERTIFIED REGISTERED

## 2024-07-22 PROCEDURE — 83735 ASSAY OF MAGNESIUM: CPT | Performed by: SURGERY

## 2024-07-22 PROCEDURE — 0T778DZ DILATION OF LEFT URETER WITH INTRALUMINAL DEVICE, VIA NATURAL OR ARTIFICIAL OPENING ENDOSCOPIC: ICD-10-PCS | Performed by: UROLOGY

## 2024-07-22 PROCEDURE — 99223 1ST HOSP IP/OBS HIGH 75: CPT | Performed by: STUDENT IN AN ORGANIZED HEALTH CARE EDUCATION/TRAINING PROGRAM

## 2024-07-22 PROCEDURE — 85610 PROTHROMBIN TIME: CPT | Performed by: STUDENT IN AN ORGANIZED HEALTH CARE EDUCATION/TRAINING PROGRAM

## 2024-07-22 PROCEDURE — 71275 CT ANGIOGRAPHY CHEST: CPT

## 2024-07-22 PROCEDURE — 999N000179 XR SURGERY CARM FLUORO LESS THAN 5 MIN W STILLS: Mod: TC

## 2024-07-22 PROCEDURE — 250N000013 HC RX MED GY IP 250 OP 250 PS 637

## 2024-07-22 PROCEDURE — C8929 TTE W OR WO FOL WCON,DOPPLER: HCPCS

## 2024-07-22 PROCEDURE — 250N000009 HC RX 250: Performed by: ANESTHESIOLOGY

## 2024-07-22 PROCEDURE — 255N000002 HC RX 255 OP 636: Performed by: UROLOGY

## 2024-07-22 PROCEDURE — C1758 CATHETER, URETERAL: HCPCS | Performed by: UROLOGY

## 2024-07-22 DEVICE — URETERAL STENT
Type: IMPLANTABLE DEVICE | Site: URETER | Status: FUNCTIONAL
Brand: POLARIS™ ULTRA

## 2024-07-22 RX ORDER — NALOXONE HYDROCHLORIDE 0.4 MG/ML
0.1 INJECTION, SOLUTION INTRAMUSCULAR; INTRAVENOUS; SUBCUTANEOUS
Status: DISCONTINUED | OUTPATIENT
Start: 2024-07-22 | End: 2024-07-22 | Stop reason: HOSPADM

## 2024-07-22 RX ORDER — IOPAMIDOL 755 MG/ML
72 INJECTION, SOLUTION INTRAVASCULAR ONCE
Status: COMPLETED | OUTPATIENT
Start: 2024-07-22 | End: 2024-07-22

## 2024-07-22 RX ORDER — HYDROMORPHONE HCL IN WATER/PF 6 MG/30 ML
0.2 PATIENT CONTROLLED ANALGESIA SYRINGE INTRAVENOUS EVERY 5 MIN PRN
Status: DISCONTINUED | OUTPATIENT
Start: 2024-07-22 | End: 2024-07-22 | Stop reason: HOSPADM

## 2024-07-22 RX ORDER — POTASSIUM CHLORIDE 7.45 MG/ML
10 INJECTION INTRAVENOUS
Status: COMPLETED | OUTPATIENT
Start: 2024-07-22 | End: 2024-07-22

## 2024-07-22 RX ORDER — PROPOFOL 10 MG/ML
INJECTION, EMULSION INTRAVENOUS PRN
Status: DISCONTINUED | OUTPATIENT
Start: 2024-07-22 | End: 2024-07-22

## 2024-07-22 RX ORDER — MAGNESIUM SULFATE HEPTAHYDRATE 40 MG/ML
2 INJECTION, SOLUTION INTRAVENOUS ONCE
Status: COMPLETED | OUTPATIENT
Start: 2024-07-22 | End: 2024-07-22

## 2024-07-22 RX ORDER — SODIUM CHLORIDE, SODIUM LACTATE, POTASSIUM CHLORIDE, CALCIUM CHLORIDE 600; 310; 30; 20 MG/100ML; MG/100ML; MG/100ML; MG/100ML
INJECTION, SOLUTION INTRAVENOUS CONTINUOUS
Status: DISCONTINUED | OUTPATIENT
Start: 2024-07-22 | End: 2024-07-22 | Stop reason: HOSPADM

## 2024-07-22 RX ORDER — ONDANSETRON 2 MG/ML
INJECTION INTRAMUSCULAR; INTRAVENOUS PRN
Status: DISCONTINUED | OUTPATIENT
Start: 2024-07-22 | End: 2024-07-22

## 2024-07-22 RX ORDER — CEFAZOLIN SODIUM/WATER 2 G/20 ML
2 SYRINGE (ML) INTRAVENOUS SEE ADMIN INSTRUCTIONS
Status: DISCONTINUED | OUTPATIENT
Start: 2024-07-22 | End: 2024-07-22 | Stop reason: HOSPADM

## 2024-07-22 RX ORDER — EPHEDRINE SULFATE 50 MG/ML
INJECTION, SOLUTION INTRAMUSCULAR; INTRAVENOUS; SUBCUTANEOUS PRN
Status: DISCONTINUED | OUTPATIENT
Start: 2024-07-22 | End: 2024-07-22

## 2024-07-22 RX ORDER — FENTANYL CITRATE 50 UG/ML
50 INJECTION, SOLUTION INTRAMUSCULAR; INTRAVENOUS EVERY 5 MIN PRN
Status: DISCONTINUED | OUTPATIENT
Start: 2024-07-22 | End: 2024-07-22 | Stop reason: HOSPADM

## 2024-07-22 RX ORDER — ONDANSETRON 2 MG/ML
4 INJECTION INTRAMUSCULAR; INTRAVENOUS EVERY 30 MIN PRN
Status: DISCONTINUED | OUTPATIENT
Start: 2024-07-22 | End: 2024-07-22 | Stop reason: HOSPADM

## 2024-07-22 RX ORDER — FENTANYL CITRATE 50 UG/ML
INJECTION, SOLUTION INTRAMUSCULAR; INTRAVENOUS PRN
Status: DISCONTINUED | OUTPATIENT
Start: 2024-07-22 | End: 2024-07-22

## 2024-07-22 RX ORDER — IOPAMIDOL 612 MG/ML
INJECTION, SOLUTION INTRAVASCULAR PRN
Status: DISCONTINUED | OUTPATIENT
Start: 2024-07-22 | End: 2024-07-22 | Stop reason: HOSPADM

## 2024-07-22 RX ORDER — HYDROMORPHONE HCL IN WATER/PF 6 MG/30 ML
0.4 PATIENT CONTROLLED ANALGESIA SYRINGE INTRAVENOUS EVERY 5 MIN PRN
Status: DISCONTINUED | OUTPATIENT
Start: 2024-07-22 | End: 2024-07-22 | Stop reason: HOSPADM

## 2024-07-22 RX ORDER — POTASSIUM CHLORIDE 1500 MG/1
40 TABLET, EXTENDED RELEASE ORAL ONCE
Status: DISCONTINUED | OUTPATIENT
Start: 2024-07-22 | End: 2024-07-22

## 2024-07-22 RX ORDER — CEFAZOLIN SODIUM/WATER 2 G/20 ML
2 SYRINGE (ML) INTRAVENOUS
Status: COMPLETED | OUTPATIENT
Start: 2024-07-22 | End: 2024-07-22

## 2024-07-22 RX ORDER — FERROUS SULFATE 325(65) MG
325 TABLET ORAL DAILY
Status: DISCONTINUED | OUTPATIENT
Start: 2024-07-22 | End: 2024-07-25

## 2024-07-22 RX ORDER — MEPERIDINE HYDROCHLORIDE 25 MG/ML
12.5 INJECTION INTRAMUSCULAR; INTRAVENOUS; SUBCUTANEOUS EVERY 5 MIN PRN
Status: DISCONTINUED | OUTPATIENT
Start: 2024-07-22 | End: 2024-07-22 | Stop reason: HOSPADM

## 2024-07-22 RX ORDER — DEXAMETHASONE SODIUM PHOSPHATE 4 MG/ML
4 INJECTION, SOLUTION INTRA-ARTICULAR; INTRALESIONAL; INTRAMUSCULAR; INTRAVENOUS; SOFT TISSUE
Status: DISCONTINUED | OUTPATIENT
Start: 2024-07-22 | End: 2024-07-22 | Stop reason: HOSPADM

## 2024-07-22 RX ORDER — ONDANSETRON 4 MG/1
4 TABLET, ORALLY DISINTEGRATING ORAL EVERY 30 MIN PRN
Status: DISCONTINUED | OUTPATIENT
Start: 2024-07-22 | End: 2024-07-22 | Stop reason: HOSPADM

## 2024-07-22 RX ORDER — FENTANYL CITRATE 50 UG/ML
25 INJECTION, SOLUTION INTRAMUSCULAR; INTRAVENOUS EVERY 5 MIN PRN
Status: DISCONTINUED | OUTPATIENT
Start: 2024-07-22 | End: 2024-07-22 | Stop reason: HOSPADM

## 2024-07-22 RX ORDER — LIDOCAINE HYDROCHLORIDE 20 MG/ML
INJECTION, SOLUTION INFILTRATION; PERINEURAL PRN
Status: DISCONTINUED | OUTPATIENT
Start: 2024-07-22 | End: 2024-07-22

## 2024-07-22 RX ADMIN — ARGATROBAN 5 MCG/KG/MIN: 50 INJECTION, SOLUTION INTRAVENOUS at 14:05

## 2024-07-22 RX ADMIN — ARGATROBAN 5 MCG/KG/MIN: 50 INJECTION, SOLUTION INTRAVENOUS at 02:07

## 2024-07-22 RX ADMIN — SODIUM CHLORIDE, POTASSIUM CHLORIDE, SODIUM LACTATE AND CALCIUM CHLORIDE: 600; 310; 30; 20 INJECTION, SOLUTION INTRAVENOUS at 22:22

## 2024-07-22 RX ADMIN — SODIUM CHLORIDE, POTASSIUM CHLORIDE, SODIUM LACTATE AND CALCIUM CHLORIDE: 600; 310; 30; 20 INJECTION, SOLUTION INTRAVENOUS at 19:28

## 2024-07-22 RX ADMIN — SODIUM CHLORIDE, POTASSIUM CHLORIDE, SODIUM LACTATE AND CALCIUM CHLORIDE: 600; 310; 30; 20 INJECTION, SOLUTION INTRAVENOUS at 06:37

## 2024-07-22 RX ADMIN — PHENYLEPHRINE HYDROCHLORIDE 100 MCG: 10 INJECTION INTRAVENOUS at 20:09

## 2024-07-22 RX ADMIN — HYDROMORPHONE HYDROCHLORIDE 0.2 MG: 0.2 INJECTION, SOLUTION INTRAMUSCULAR; INTRAVENOUS; SUBCUTANEOUS at 08:03

## 2024-07-22 RX ADMIN — ONDANSETRON 4 MG: 2 INJECTION INTRAMUSCULAR; INTRAVENOUS at 21:29

## 2024-07-22 RX ADMIN — ARGATROBAN 5 MCG/KG/MIN: 50 INJECTION, SOLUTION INTRAVENOUS at 22:18

## 2024-07-22 RX ADMIN — Medication 5 MG: at 20:38

## 2024-07-22 RX ADMIN — LIDOCAINE HYDROCHLORIDE 100 MG: 20 INJECTION, SOLUTION INFILTRATION; PERINEURAL at 20:09

## 2024-07-22 RX ADMIN — HUMAN ALBUMIN MICROSPHERES AND PERFLUTREN 9 ML: 10; .22 INJECTION, SOLUTION INTRAVENOUS at 11:10

## 2024-07-22 RX ADMIN — ONDANSETRON 4 MG: 2 INJECTION INTRAMUSCULAR; INTRAVENOUS at 20:23

## 2024-07-22 RX ADMIN — MAGNESIUM SULFATE HEPTAHYDRATE 2 G: 40 INJECTION, SOLUTION INTRAVENOUS at 08:29

## 2024-07-22 RX ADMIN — HYDROMORPHONE HYDROCHLORIDE 0.2 MG: 0.2 INJECTION, SOLUTION INTRAMUSCULAR; INTRAVENOUS; SUBCUTANEOUS at 09:44

## 2024-07-22 RX ADMIN — ASPIRIN 81 MG: 81 TABLET, COATED ORAL at 08:17

## 2024-07-22 RX ADMIN — PHENYLEPHRINE HYDROCHLORIDE 0.8 MCG/KG/MIN: 10 INJECTION INTRAVENOUS at 20:16

## 2024-07-22 RX ADMIN — ACETAMINOPHEN 975 MG: 325 TABLET, FILM COATED ORAL at 14:05

## 2024-07-22 RX ADMIN — PHENYLEPHRINE HYDROCHLORIDE 150 MCG: 10 INJECTION INTRAVENOUS at 20:20

## 2024-07-22 RX ADMIN — ARGATROBAN 5 MCG/KG/MIN: 50 INJECTION, SOLUTION INTRAVENOUS at 05:52

## 2024-07-22 RX ADMIN — POTASSIUM CHLORIDE 10 MEQ: 7.46 INJECTION, SOLUTION INTRAVENOUS at 07:01

## 2024-07-22 RX ADMIN — ACETAMINOPHEN 975 MG: 325 TABLET, FILM COATED ORAL at 08:16

## 2024-07-22 RX ADMIN — FENTANYL CITRATE 25 MCG: 50 INJECTION INTRAMUSCULAR; INTRAVENOUS at 20:09

## 2024-07-22 RX ADMIN — ARGATROBAN 5 MCG/KG/MIN: 50 INJECTION, SOLUTION INTRAVENOUS at 09:36

## 2024-07-22 RX ADMIN — PHENYLEPHRINE HYDROCHLORIDE 150 MCG: 10 INJECTION INTRAVENOUS at 20:16

## 2024-07-22 RX ADMIN — PROPOFOL 130 MG: 10 INJECTION, EMULSION INTRAVENOUS at 20:09

## 2024-07-22 RX ADMIN — SODIUM CHLORIDE 80 ML: 9 INJECTION, SOLUTION INTRAVENOUS at 08:58

## 2024-07-22 RX ADMIN — SODIUM CHLORIDE: 9 INJECTION, SOLUTION INTRAVENOUS at 08:21

## 2024-07-22 RX ADMIN — SODIUM PHOSPHATE, MONOBASIC, MONOHYDRATE AND SODIUM PHOSPHATE, DIBASIC, ANHYDROUS 9 MMOL: 142; 276 INJECTION, SOLUTION INTRAVENOUS at 12:08

## 2024-07-22 RX ADMIN — POLYETHYLENE GLYCOL 3350 17 G: 17 POWDER, FOR SOLUTION ORAL at 08:21

## 2024-07-22 RX ADMIN — ARGATROBAN 5 MCG/KG/MIN: 50 INJECTION, SOLUTION INTRAVENOUS at 18:20

## 2024-07-22 RX ADMIN — INSULIN ASPART 3 UNITS: 100 INJECTION, SOLUTION INTRAVENOUS; SUBCUTANEOUS at 08:10

## 2024-07-22 RX ADMIN — PHENYLEPHRINE HYDROCHLORIDE 100 MCG: 10 INJECTION INTRAVENOUS at 20:24

## 2024-07-22 RX ADMIN — IOPAMIDOL 72 ML: 755 INJECTION, SOLUTION INTRAVENOUS at 08:58

## 2024-07-22 RX ADMIN — Medication 2 G: at 20:05

## 2024-07-22 RX ADMIN — Medication 5 MG: at 20:40

## 2024-07-22 RX ADMIN — HYDROMORPHONE HYDROCHLORIDE 0.2 MG: 0.2 INJECTION, SOLUTION INTRAMUSCULAR; INTRAVENOUS; SUBCUTANEOUS at 05:23

## 2024-07-22 RX ADMIN — POTASSIUM CHLORIDE 10 MEQ: 7.46 INJECTION, SOLUTION INTRAVENOUS at 08:02

## 2024-07-22 RX ADMIN — HYDROMORPHONE HYDROCHLORIDE 0.2 MG: 0.2 INJECTION, SOLUTION INTRAMUSCULAR; INTRAVENOUS; SUBCUTANEOUS at 01:22

## 2024-07-22 RX ADMIN — PHENYLEPHRINE HYDROCHLORIDE 100 MCG: 10 INJECTION INTRAVENOUS at 20:28

## 2024-07-22 ASSESSMENT — ACTIVITIES OF DAILY LIVING (ADL)
ADLS_ACUITY_SCORE: 49
ADLS_ACUITY_SCORE: 51
ADLS_ACUITY_SCORE: 50
ADLS_ACUITY_SCORE: 49
ADLS_ACUITY_SCORE: 50
ADLS_ACUITY_SCORE: 52
ADLS_ACUITY_SCORE: 52
ADLS_ACUITY_SCORE: 49
ADLS_ACUITY_SCORE: 49
ADLS_ACUITY_SCORE: 51
ADLS_ACUITY_SCORE: 50
ADLS_ACUITY_SCORE: 49
ADLS_ACUITY_SCORE: 51
ADLS_ACUITY_SCORE: 49
ADLS_ACUITY_SCORE: 49
ADLS_ACUITY_SCORE: 51
ADLS_ACUITY_SCORE: 52
ADLS_ACUITY_SCORE: 50
ADLS_ACUITY_SCORE: 51
ADLS_ACUITY_SCORE: 49
DEPENDENT_IADLS:: INDEPENDENT
ADLS_ACUITY_SCORE: 52
ADLS_ACUITY_SCORE: 50
ADLS_ACUITY_SCORE: 51

## 2024-07-22 ASSESSMENT — LIFESTYLE VARIABLES: TOBACCO_USE: 1

## 2024-07-22 NOTE — PHARMACY-ADMISSION MEDICATION HISTORY
Pharmacist Admission Medication History    Admission medication history is complete. The information provided in this note is only as accurate as the sources available at the time of the update.    Information Source(s): Patient and Hospital records via in-person    Pertinent Information: Has received none of her home medications today    Changes made to PTA medication list:  Added: All entries added   Deleted: None  Changed: None    Allergies reviewed with patient and updates made in EHR: yes    Medication History Completed By: Aleksandar Howard [facility] 7/21/2024 9:50 PM    PTA Med List   Medication Sig Last Dose    ALPHA LIPOIC ACID PO Take 1 capsule by mouth daily     ASHWAGANDHA PO Take 1 tablet by mouth daily     Berberine Chloride (BERBERINE HCI PO) Take 1 tablet by mouth daily     Bilberry, Vaccinium myrtillus, (BILBERRY PO) Take 15 mg by mouth daily     BIOTIN PO Take 25 mg by mouth daily     carboxymethylcellulose PF (REFRESH PLUS) 0.5 % ophthalmic solution Place 1 drop into both eyes 2 times daily     cinnamon 500 MG TABS Take 500 mg by mouth daily     COLLAGEN PO Take 500 mg by mouth 2 times daily     Echinacea 400 MG CAPS Take 400 mg by mouth daily as needed (cold symptoms)  at PRN    Ginkgo Biloba (GINKOBA PO) Take 25 mg by mouth daily     insulin detemir (LEVEMIR PEN) 100 UNIT/ML pen Inject 12 Units subcutaneously at bedtime     lactobacillus rhamnosus, GG, (CULTURELL) capsule Take 1 capsule by mouth daily     lecithin (LECITHIN) 1200 MG CAPS capsule Take 2,400 mg by mouth daily     metFORMIN (GLUCOPHAGE-XR) 750 MG 24 hr tablet Take 750 mg by mouth 2 times daily     Moringa Oleifera (MORINGA PO) Take 1 tablet by mouth 2 times daily     multivitamin w/minerals (MULTI-VITAMIN) tablet Take 1 tablet by mouth daily     traZODone (DESYREL) 150 MG tablet Take 75 mg by mouth nightly as needed for sleep  at PRN    vitamin C (ASCORBIC ACID) 500 MG tablet Take 500 mg by mouth daily     vitamin E 400 units TABS Take 400  Units by mouth daily

## 2024-07-22 NOTE — PLAN OF CARE
Goal Outcome Evaluation:      Plan of Care Reviewed With: patient, child    Overall Patient Progress: improvingOverall Patient Progress: improving    A/Ox4. VSS weaned to 1L NC for comfort. Tele SR. CMS intact, +2 palpable pulses to BLE. Pt is moving LLE voluntarily and continues to wiggle toes, + tenderness, R and L groin sites WNL, L groin site, tender. Pain managed with IV Dilaudid. Left internal jugular CVC with introducer infusing NS, LR, IV Abx and Lytes replacements, 1 PIV Argatroban 5mcg/kg/min, PTT within goal range.  1 PIV SL. Hui with approx 450ml cloudy, yellow output. Pt tolerating clears, advanced to regular diet this morning. Pt refused PO medications until after she tolerates solid food. BGs 250s-340s, covered with sliding scale insulin. Hgb 7.2, MD notified. Plan for Urology consult today, ECHO, CTA chest. Discharge pending.

## 2024-07-22 NOTE — PROGRESS NOTES
UROLOGY BRIEF NOTE    Pt admitted with distal aortic thrombus causing left foot weakness and numbness. She is now s/p aortic embolectomy and left leg fasciotomies on 7/21. She follows with urology in [place] for known left renal stone, recurrent UTIs. Apparently had stone surgery planned for this week. Most recent CT reportedly with mild left hydronephrosis, left renal stone. I am unable to see or review any recent imaging reports or images, no urology notes in chart.     She is currently vitally stable, afebrile  Creat 0.6  WBC 13  UA appears possibly infected, UCx is pending     I did attempt to see her this AM but she was out of her room for imaging study, unfortunately     PLAN:   Will need to review CT images, if unable to obtain these films then would repeat CT stone protocol   Her creat is WNL and stable, WBC is improving- would hold off on intervention as long as she remains stable/improving on abx   Abx per primary service, await final UCx result     If intervention indicated, will likely need left ureteral stent placement in the OR; would not be able to pursue PCN d/t anticoagulation     Will discuss with my attending physician and see pt for formal evaluation later today     ADDM (8920): Able to review CT in PACS system. Large stone in left proximal ureter, mild-mod hydronephrosis. Also with large stone in left kidney- nonobstructing. Will review with attending, plan tbd.     ADDM (3594): Reviewed with Dr. Byrd, recommend ureteral stent placement today given concern for infected urine/pus behind the stone in her left prox ureter. Spoke with pt's RN- ate breakfast at 7:30AM but nothing since. Please keep NPO, added on for procedure at 7PM today. Will see her to discuss in the next 1-2hrs.     Enedelia Segal PA-C  [facility]   [email or web address]  Text Page (7:30am to 4:30pm)

## 2024-07-22 NOTE — PROVIDER NOTIFICATION
Nursing not   .MD Notification    Notified Person: MD    Notified Person Name:Dr. Christy    Notification Date/Time:7/22/24/1348    Notification Interaction:[employer] messaging    Purpose of Notification: BGM of 412    Orders Received: No    Comments: BGM rechecked after given 12 units of Aspart subcutaneous for BGM of 507.

## 2024-07-22 NOTE — PROGRESS NOTES
Nursing note  Pt admitted with Acute limb Ischemia 2/2 aortic occlusion with distal embolization to LLE. Pt is POD # 1 of aortic embolectomy via B femoral cutdowns with embolectomy of LLE and 4 compartment fasciotomies. Pt transferred from ICU to  this afternoon via cart. Argatroban infusing at arrival at 5 Mcg/KG/Min.via left wrist PIV, small old drainage on the PIV insertion site, but line is patent. Settled pt down and orient pt to the room. Pt denies any pain at rest, but c/o mild pain with movement. Cms wise pt c/o n/t to the LLE. Dressing on the LLE c/d/I. Strong + DP/PTpulses. No edema noted. Mepilex on the coccyx for preventative, no skin breakdown noted on 4 eyes skin check except bilateral groins surgical site that was covered with Tegaderm(CDI). No active bleeding or hematoma noted. Dressing on the R internal jugular that was pulled in icu c/d/I. Pt denies any n/v. No c/o dizziness or lightheadedness. BGM at noon is 507, MD notified and changes was made on S/S. Hui patent. Pt on cardiac monitor. VSS, on RA./49 (BP Location: Right arm)   Pulse 93   Temp 98.7  F (37.1  C) (Oral)   Resp 20   Ht 1.524 m (5')   Wt 49 kg (108 lb 0.4 oz)   SpO2 92%   BMI 21.10 kg/m   Phos this am was 2.4, pt currently receiving replacement.  Pt is NPO and will be going down to OR at 1900 tonight for stent placement.Will continue to monitor  Kulwant Valiente RN

## 2024-07-22 NOTE — CONSULTS
Patient did not elect Medicare D and has no other prescription coverage.       Prices using [email or web address] discount card:     Xarelto: $523/mo   Eliquis: $525/mo   Dabigatran: $177/mo   Jantoven (warfarin): $16/mo      Discharge Pharmacy can dispense 1 mo free Xarelto or Eliquis, provided patient has not received a previous supply.    Free Eliquis and Xarelto are available through the mail to income-eligible patients.  Application and info for Eliquis at [email or web address] or by calling [phone].  Application and info for Xarelto at [employer] or [phone].     Lupis Greenberg  Pharmacy Technician/Liaison, Discharge Pharmacy   [phone] (voice or text)  [email or web address]

## 2024-07-22 NOTE — PROGRESS NOTES
Vascular Surgery Progress Note  07/22/2024       Subjective:  Patient resting comfortably in bed. Able to wiggle toes more than prior. Left foot still numb to touch however and patient reports she isn't able to tell if she lifts her leg off of the bed.     Objective:  Temp:  [96.3  F (35.7  C)-99.4  F (37.4  C)] 98.8  F (37.1  C)  Pulse:  [] 90  Resp:  [10-39] 22  BP: (111-152)/(49-81) 113/49  SpO2:  [95 %-100 %] 96 %    I/O last 3 completed shifts:  In: 4693.84 [P.O.:200; I.V.:3993.84; IV Piggyback:500]  Out: 1525 [Urine:1025; Blood:500]      Gen: Awake, alert, NAD  CV: RRR per DP pulse  Resp: NLB on NC  Incision: c/d/I in bilateral groins  Ext: WWP  Vascular: LLE with palpable DP pulses.   RLE with palpable DP and PT pulses.   Neuro: Wiggles toes equally BLE.   Sensation to light touch diminished on L foot compared to R. 4/5 plantar flexion and 3/5 dorsiflexion.   Fasciotomies changed today - muscle viable and well appearing, no bleeding appreciated.      Labs:  Recent Labs   Lab 07/22/24  0525 07/21/24 2005 07/21/24  1255   WBC 13.3* 21.2* 10.4   HGB 7.2* 9.1* 9.1*   * 660* 673*       Recent Labs   Lab 07/22/24  0743 07/22/24  0525 07/22/24 0210 07/21/24  2224 07/21/24 2005 07/21/24  1918 07/21/24  1255   NA  --  135  --   --  134*  --  133*   POTASSIUM  --  3.3*  --   --  4.1  --  3.6   CHLORIDE  --  104  --   --  98  --  97*   CO2  --  22  --   --  22  --  27   BUN  --  9.7  --   --  11.2  --  7.4*   CR  --  0.63  --   --  0.59  --  0.62   * 258* 340*   < > 280*   < > 148*   ARTURO  --  6.6*  --   --  7.6*  --  8.2*   MAG  --  1.3*  --   --   --   --   --    PHOS  --  2.4*  --   --   --   --   --     < > = values in this interval not displayed.     K= 3.3  Hgb 7.2 from 9.1 -> expected postoperatively    Imaging:  No new imaging reviewed.        Assessment/Plan:   74 year old female with  past medical history significant for type II DM, HTN, recurrent UTI, nephrolithiasis admitted on  7/21/2024 with acute limb ischemia 2/2 aortic occlusion with distal embolization to LLE now s/p aortic embolectomy via bilateral femoral cutdowns with embolectomy of LLE and 4 compartment fasciotomies. Doing well this am with improved motor function.     - Continue argatroban  - Embolic workup (CTA chest, TTE ordered)   - Consider hypercoaguable workup pending results of echo  - Will need to be on longterm a/c, will place pharmacy consults for eval cost  - Replete K today  - hgb 7.2 from 9.1, postoperative anemia, will start iron tabs  - Remove ross  - Pt with foot drop and ongoing neuro deficits, not surprising given >24 hours ischemia time, these will take time to recover and may not recover at all.   -Orthotics consult for foot drop boot today  - OK for transfer to gen surg floor today, continue tele monitor for paroxysmal afib  - Appreciate hospitalist comanagement of medical comorbidities        Discussed with vascular surgery staff, who is in agreement with the above.  - - - - - - - - - - - - - - - - - -  Alexis Brown, PGY-4  Vascular Surgery Resident    STAFF: Patient examined this morning with Dr. Brown in ICU.  She is alert and appropriate.  Very stable vital signs and good urine output.  Both groin incisions are healing well from open exposure for embolectomy yesterday.  +3 DP pulses bilaterally.  Some weakness is noted in left foot.  Fasciotomy sites will be evaluated later.    CTA of the chest revealed normal anatomy with no dissection, severe PAD, or aortic thrombus.  4.6 cm mass left adrenal alert Kyer further workup.    Thus, infrarenal aortic occlusion of uncertain etiology.  Cardiac workup in progress.         Ken Corrales MD

## 2024-07-22 NOTE — BRIEF OP NOTE
[facility]    Brief Operative Note    Pre-operative diagnosis: Acute occlusion of aortic bifurcation due to thromboembolism (H) [I74.09]  Post-operative diagnosis Same as pre-operative diagnosis    Procedure: Bilateral femoral artery cutdowns. Aortic embolectomy via both groins. Bilateral iliac artery embolectomies. Left lower extremity SFA/popliteal embolectomy. Left lower extremity angiogram via L femoral with nixon embolectomies of L AT, PT and peroneal. 4 Compartment fasciotomies of LLE with exposure of AT and PT arteries. AT embolectomy and primary repair.     Surgeon: Surgeons and Role:     * Brandie Ochoa MD - Primary     * Rosie Brown MD - Assisting  Anesthesia: General   Estimated Blood Loss: 500 ml    Drains: None  Specimens:   ID Type Source Tests Collected by Time Destination   1 : AORTIC THROMBUS Tissue Groin, Left SURGICAL PATHOLOGY EXAM Brandie Ochoa MD 7/21/2024  6:47 PM      Findings:   Acute and chronic appearing clot removed from aorta as well as from LLE  on antegrade run with nixon catheter. Bilateral femoral arteries with excellent palpable pulses with strong inflow appreciated after embolectomies.   No thrombus removed from AT during AT embolectomy. No thrombus removed from tibial vessels when embolectomy performed under angiogram guidance.   prior to closing. AT artery with excellent inflow after embolectomy with good back bleeding.      Palpable pulses in L DP and R DP and PT arteries at end of procedure.       Complications: None.  Implants: * No implants in log *    Plan:   Stat labs  ICU consult for assistance, appreciate help in managing medical problems as well as monitoring given concern for possible reperfusion syndrome this evening.   Q1 pulse/CMS checks - alert vascular surgery with any loss of pulses.   Pain control prn   Continue argatroban, dosing per pharmacy, aim for therapeutic range.   Watch for arrhythmias   Will need embolic workup -  echo, CTA chest, watch for parox afib on tele.  OK for adat to regular diet  PPI ppx  Keep ross this evening, monitor I/O  H/o UTI w/ kidney stones and hydronephrosis, was planned for procedure with urology this weds as outpt - Will place on abx, will review what previously on and order appropriately.   NS 70cc/hr overnight until taking adequate PO  Bairhugger for warming  Hold home metformin given angiogram  Sliding scale insulin prn

## 2024-07-22 NOTE — CONSULTS
Intensivist Consult    75 YO with history of DM and recurrent UTI's with left renal calculus with mild hydronephrosis presented to [facility] in [place], [place] on 7-20 with onset of left foot weakness and numbness,  CT imaging showed calcified and non-calcified distal aortic thrombus at bifurcation. Also with associated with segmental occlusion of distal popiteal artery, tibioperoneal trunck and short segment of the proximal anterior tibial artery. On presentation was not hypotensive, no CP of SOB.  WBC on presentation 17.4 and Glu 449. Started on Agatroban and was transferred to [facility] for emergent Vascular surgery consultation.  Reportedly was on antibiotics for UTI prior to hospitalization.  Today underwent Aortic embolectomy, left anterior tibial embolectomy, bilateral iliac artery embolization and 4 compartment left leg fasciotomies.   Extubated in PACU, arrives to ICU off vasopressors.    PMH:  All: NKDA, sensitivity to heparin-like medication  Med Prob: Renal calculus with recurrent UTI/pyelonephritis, DM  PSH: Orthopedic procedures  Meds:  Metformin, Levemir, Trazadone, Vit C    SH: .  No tobacco for past 10 years    FH:  Patient unable to provide due to residual somnolence from surgery    ROS:  Unable to obtain secondary to post-operative somnolence    PE:  Gen:  Awake. Answers a few simple questions.  In no acute distress  V: T96.3 P 91 /56  HEENT: PERRL, EOMIB, sclera clear, no JVD  Lungs:  Decreased BS in bases, no wheezes or rhonchi  Ht: RRR, no mgr  Abd: Hypoactive BS, no tenderness  Ext:  Left left dressed.  Right normal.  Feet are warm  Neuro: CN 2-12 intact as could be tested.  Muscle strength could not be tested due to patient not being able to cooperate    Labs:  At [facility]  WBC 10.4, Hb 10.2, plt 142.  Na 133, K 3.6, HCO3 27, Glu 148.  Ca 8.2, AST 17, ALT 18, TP 6.4, bili 0.3.  LA 1.1.    Assessment and plan:  Neuro- no acute or chronic issues.  Will follow as sedation/anesthesia  clears  Pulmonary- On supplemental oxygen.  Pulmonary clearance.  No history of lung disease.  No tobacco x 1o yrs.  CV- aortic thrombus with distal embolization of left left s/p thrombectomy and embolectomy.  Need to follow for reperfusion injury.  At present hemodynamically stable.  On agatroban.  Vascular surgery following.  Endocrine- DM on Levemir and Metformin.  NPO currently. Will cover with sliding scale insulin overnight.  Restart home regimen when taking PO.  Renal- Recurrent UTI.  Left hydronephrosis with renal calculi.  Recently on antibiotics- unclear which antibiotic.  UA with culture sent.  Will start ceftriaxone pending urine culture.  Urology consult for further work-up and management.    Mary Kay Zarate MD  [phone]

## 2024-07-22 NOTE — TREATMENT PLAN
Neuro: PT A/O x 4 uses the call light appropriately. Follows commands. Able to move all extremities  CV: SR [id number], SBP 120s. L leg with +2 pedal pulses, >3 sec cap refill, extremiti is warm to touch. PT reported numbness and tingling on the L foot. Medicated for pain on the  L foot with good relief.  Resp: RA in the AM and 1L O2 while sleeping only   GI: NPO now per Urology for stent placement in the OR today at 7 pm. BS active   : Kept ross catheter per urology for procedure.  ml/ hr   Skin: Dressing change on L leg surgical site done per vascular surgery. Skin intact otherwise  Access: PIV x 2    Gtts: Argatroban    Notified provider about indwelling ross catheter discussed removal or continued need.    Did provider choose to remove indwelling ross catheter? Yes    Provider's ross indication for keeping indwelling ross catheter:  Kept in per urology order for surgical procedure this evening 7/22    Is there an order for indwelling ross catheter? Yes    *If there is a plan to keep ross catheter in place at discharge daily notification with provider is not necessary.

## 2024-07-22 NOTE — CONSULTS
Care Management Initial Consult    General Information  Assessment completed with: Patient, Family, Lila kel Rosasa  Type of CM/SW Visit: Initial Assessment    Primary Care Provider verified and updated as needed: Yes   Readmission within the last 30 days: no previous admission in last 30 days      Reason for Consult: discharge planning  Advance Care Planning:     (provided hand out)  General Information Comments:  (Lives in [facility])    Communication Assessment  Patient's communication style: spoken language (English or Bilingual)             Cognitive  Cognitive/Neuro/Behavioral: WDL  Level of Consciousness: alert  Arousal Level: opens eyes spontaneously  Orientation: oriented x 4  Mood/Behavior: calm, cooperative  Best Language: 0 - No aphasia  Speech: clear, spontaneous, logical    Living Environment:   People in home: alone     Current living Arrangements: house      Able to return to prior arrangements: other (see comments)  Living Arrangement Comments:  (TBD)    Family/Social Support:  Care provided by:    Provides care for: no one  Marital Status: Single  Children          Description of Support System: Supportive, Involved    Support Assessment: Adequate family and caregiver support    Current Resources:   Patient receiving home care services: No     Community Resources: None  Equipment currently used at home: none  Supplies currently used at home: None    Employment/Financial:  Employment Status: retired     Employment/ Comments:  ([employer])  Financial Concerns: none   Referral to Financial Worker: No       Does the patient's insurance plan have a 3 day qualifying hospital stay waiver?  No    Lifestyle & Psychosocial Needs:  Social Determinants of Health     Food Insecurity: Not on file   Depression: Not on file   Housing Stability: Not on file   Tobacco Use: Not on file   Financial Resource Strain: Not on file   Alcohol Use: Not on file   Transportation Needs: Not on file   Physical Activity: Not on  file   Interpersonal Safety: Not on file   Stress: Not on file   Social Connections: Not on file   Health Literacy: Not on file       Functional Status:  Prior to admission patient needed assistance:   Dependent ADLs:: Independent  Dependent IADLs:: Independent       Mental Health Status:  Mental Health Status: No Current Concerns       Chemical Dependency Status:  Chemical Dependency Status: No Current Concerns             Values/Beliefs:  Spiritual, Cultural Beliefs, [employer] Practices, Values that affect care: yes          Values/Beliefs Comment:  (Debra)    Additional Information:  Consult for discharge planning. 74 year old female patient with past medical history significant for type II DM, HTN, recurrent UTI, nephrolithiasis admitted on 7/21/2024 with acute limb ischemia.   Writer met with patient and her daughter Noble at bedside. Patient lives in a house alone with five stairs to enter. Patient was independent with ADL's prior to admit, however there are walkers and grab bars if needed at the home. Patients daughter and son in law live close by and could help if needed. Patient would like to return to [place], [place] and if TCU is recommended, writer provided a list of facilities near patients home via Medicare.[facility] is a 8 hour drive one way and family can not provide transport. Writer did discuss cost of transport being over $5 per mile but need assessment from PT/OT to formally begin discharge planning.          BOBBI Dhaliwal

## 2024-07-22 NOTE — CONSULTS
"[facility]  Consult Note - Hospitalist Service  Date of Admission:  7/21/2024  Consult Requested by: Dr. Woods  Reason for Consult: Medical co-management    Assessment & Plan   Lila Jenkins is a 74 year old female with past medical history significant for type II DM, HTN, recurrent UTI, nephrolithiasis admitted on 7/21/2024 with acute limb ischemia.     Pt presented to an outside hospital on 7/21 with acute onset left lower extremity numbness and pain. 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 LLE with occlusion noted in below knee popliteal artery with scattered runoff seen to the LLE, no inline flow to the foot. She has bety 2B acute limb ischemia resulting from acute aortic occlusion with distal embolization to LLE tibial vessels. She was transferred to [facility] for vascular surgery consultation. Pt was taken emergently for Aortic embolectomy, left anterior tibial embolectomy, bilateral iliac artery embolization and 4 compartment fasciotomies of the LLE. The procedure was well tolerated. The patient was extubated and stable upon arrival to the ICU. The hospitalist service was consulted for medical co-management.     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 with [place] 2B ischemia   S/p Aortic embolectomy, left anterior tibial embolectomy, bilateral iliac artery embolization and 4 compartment fasciotomies of the LLE (7/21/24)   - Post-operative cares, dressing changes, anticoagulation, pain control, activity restrictions per vascular surgery    - Pt currently anticoagulated with argatroban due to allergy to heparin component, continue per vascular   - CTA chest and Echo ordered per vascular surgery for hypercoag work-up, consider heme/onc consultation  - per vascular: \"Pt " "with foot drop and ongoing neuro deficits, not surprising given >24 hours ischemia time, these will take time to recover and may not recover at all.\"  - Orthotics consult for foot drop boot today    Type II DM   Hyperglycemia  Very poorly controlled. Hemoglobin A1C is 10.9% on admission. Blood sugars in the high-200-500 range currently.   PTA regimen includes Detemir 12 units at bedtime, Metformin 750mg BID, and Berberine.   - Resume Detemir, increase to 20 units   - Will add 1/10 carb coverage with meals (holding now while NPO)  - Increase  to HDSSI   - Hypoglycemia protocol   - Hold PTA metformin and berberine     Acute Blood Loss Anemia   Reactive Thrombocytosis   Hemoglobin on admission was around 9. Dropped to 7.2 post-operatively   - Conditional PRBCs ordered for Hgb <7   - Iron tabs started per vascular   - Monitor hemoglobin Q12H     Supratherapeutic INR   Pt was given argatroban and bivalirudin at the OSH which can elevate INR. She is not on any anticoagulation PTA   - Re-check INR.     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 Additional left renal calyceal calculi. Air in the left renal calices along with perinephric stranding concern for emphysematous pyelonephritis. Non-obstructing R nephrolithiasis.   * Urology consulted and recommending ureteral stent placement today at 7pm given concern for infected urine/pus behind the stone in her left prox ureter.   - Recently on antibiotics, continue with Ceftriaxone for now   - Follow urine cultures   - Appreciate urology recommendations     Prolonged QT  QTc on admission is 499   - Avoid QT prolonging medications   - Telemetry     Leukocytosis   WBC initially within normal limits at 1.0, but then acutely koffi to " 21.2 on admission, now down to 13.3.   - Monitor   - On Ceftriaxone as noted above for possible UTI.     Hypokalemia  Hypomagnesemia  Hypophosphatemia  - Replacement protocols ordered    Supplement Use   Pt takes a number of supplements including Alpha lipoic acid, Ashwaganda, Berberine, Bilberry, Biotin, Cinnamon, Collagen, Echinacea, Ginko Biloba, Culturell, Lecithin, Moringa Oleifera, Multivitamin, Vitamin C, and Vitamin D   - Will hold all supplements while hospitalized   - Pt will need long-term anticoagulation with either DOAC or metformin, will need to discuss with pharmacy if this could interact with any of these supplements     Adrenal nodule   CT chest showed 4.6cm left adrenal nodule. Review of outside paperwork suggests this has been previously identified and stable from prior   - Continue outpatient evaluation       Clinically Significant Risk Factors Present on Admission        # Hypokalemia: Lowest K = 3.3 mmol/L in last 2 days, will replace as needed     # Hypomagnesemia: Lowest Mg = 1.3 mg/dL in last 2 days, will replace as needed   # Hypoalbuminemia: Lowest albumin = 2.9 g/dL at 7/21/2024  8:05 PM, will monitor as appropriate  # Coagulation Defect: INR = 7.11 (Ref range: 0.85 - 1.15) and/or PTT = 84 Seconds (Ref range: 22 - 38 Seconds), will monitor for bleeding       # Anemia: based on hgb <11          # DMII: A1C = 10.9 % (Ref range: <5.7 %) within past 6 months               Sara Christy MD  Hospitalist Service  Securely message with [employer] (more info)  Text page via [facility] Paging/Directory   ______________________________________________________________________    Chief Complaint   Leg pain    History is obtained from the patient    History of Present Illness   Lila Jenkins is a 74 year old female who presented to an outside ED with left lower extremity leg pain and numbness. Per report, the patient reported initial onset of pain around 3:30 PM on 7/21. This resolved, and then returned  at 7:30pm. She rated the pain at a 10/10. She was unable to move the leg normally. She was found to have critical limb ischemia with CT findings as above. She was transferred urgently for embolectomy and 4 compartment fasciotomy which she tolerated well. She is hemodyncamically stable and ready for transfer out of the ICU.  She is seen after transfer to general surgery floor. She is doing well. She is quite adamant about being discharged tomorrow. Discussed at length all of the the issues that we needed to address prior to discharge.        Past Medical History    No past medical history on file.    Past Surgical History   No past surgical history on file.    Medications   I have reviewed this patient's current medications     Review of Systems    The 10 point Review of Systems is negative other than noted in the HPI or here.     Physical Exam   Vital Signs: Temp: 98.8  F (37.1  C) Temp src: Oral BP: 113/49 Pulse: 90   Resp: 22 SpO2: 96 % O2 Device: Nasal cannula Oxygen Delivery: 1 LPM  Weight: 108 lbs .41 oz  Constitutional: Awake, alert, cooperative, no apparent distress.  Eyes: Conjunctiva and pupils examined and normal.  HEENT: Moist mucous membranes, normal dentition.  Respiratory: Clear to auscultation bilaterally, no crackles or wheezing.  Cardiovascular: Regular rate and rhythm, normal S1 and S2, and no murmur noted.  GI: Soft, non-distended, non-tender  Musculoskeletal: LLE dressing just changed, not examined. See vascular note  Neurologic: Cranial nerves 2-12 intact, normal strength and sensation.  Psychiatric: Alert, oriented to person, place and time, no obvious anxiety or depression.    Medical Decision Making       75 MINUTES SPENT BY ME on the date of service doing chart review, history, exam, documentation & further activities per the note.      Data     I have personally reviewed the following data over the past 24 hrs:    13.3 (H)  \   7.2 (L)   / 529 (H)     135 104 9.7 /  507 (HH)   3.3 (L) 22  0.63 \     ALT: 16 AST: 24 AP: 120 TBILI: 0.2   ALB: 2.9 (L) TOT PROTEIN: 5.8 (L) LIPASE: N/A     TSH: N/A T4: N/A A1C: 10.9 (H)     Procal: N/A CRP: N/A Lactic Acid: 1.1       INR:  3.93 (H) PTT:  84 (H)   D-dimer:  N/A Fibrinogen:  380       Imaging results reviewed over the past 24 hrs:   Recent Results (from the past 24 hour(s))   CTA Chest with Contrast    Narrative    CTA CHEST WITH CONTRAST  2024 9:18 AM    CLINICAL HISTORY: Aortic thrombus, evaluate possible source.    TECHNIQUE: CT angiogram chest during arterial phase injection IV  contrast. 2D and 3D MIP reconstructions were performed by the CT  technologist. Dose reduction techniques were used.     CONTRAST: 72 mL [id number]    COMPARISON: None.    FINDINGS:  ANGIOGRAM CHEST: Pulmonary arteries are normal caliber and negative  for pulmonary emboli. Thoracic aorta is negative for thrombus or  dissection. No CT evidence of right heart strain.    LUNGS AND PLEURA: Trace bilateral pleural effusions with adjacent  compressive atelectasis in the lower lobes. The lungs are otherwise  clear.    MEDIASTINUM/AXILLAE: No lymphadenopathy. Moderate coronary artery  calcification.    UPPER ABDOMEN: Indeterminate 4.6 cm left adrenal nodule.    MUSCULOSKELETAL: Normal.      Impression    IMPRESSION:  1.  No thoracic aortic thrombus or dissection.  2.  Indeterminate 4.6 left adrenal nodule. Recommend further  evaluation with adrenal protocol CT/MRI when clinically appropriate,  if this is an unknown finding.   Echocardiogram Complete   Result Value    LVEF  75%    Narrative    [id number]  [id number]  [id number]  [id number]^EZIO^[facility]  Echocardiography Laboratory  [address] [place] [id number]     Name: FERNANDO CHEN  MRN: [id number]  : 1950  Study Date: 2024 10:42 AM  Age: 74 yrs  Gender: Female  Patient Location: [facility]  Reason For Study: Arterial Embolism and Thrombosis  Ordering Physician: EZIO  QUINTON  Performed By: Verenice Navarro     BSA: 1.4 m2  Height: 60 in  Weight: 102 lb  HR: 87  BP: 133/65 mmHg  ______________________________________________________________________________  Procedure  Complete Portable Echo Adult. Optison (NDC #[id number]) given intravenously.  ______________________________________________________________________________  Interpretation Summary     The visual ejection fraction is estimated at 75%.  Hyperdynamic left ventricular function  An intracavitary gradient is present. With Valsalva the gradient increases to  28 mmHg which is mild. No evidence of systolic anterior motion of the mitral  valve leaflet.  Trivial pericardial effusion  The study was technically difficult.  ______________________________________________________________________________  Left Ventricle  The left ventricle is normal in size. There is normal left ventricular wall  thickness. Diastolic Doppler findings (E/E' ratio and/or other parameters)  suggest left ventricular filling pressures are increased. Grade I or early  diastolic dysfunction. An intracavitary gradient is present. With Valsalva the  gradient increases to 28 mmHg which is mild. No evidence of systolic anterior  motion of the mitral valve leaflet. Hyperdynamic left ventricular function.  The visual ejection fraction is estimated at 75%.     Right Ventricle  The right ventricle is normal in size and function.     Atria  Normal left atrial size. Right atrial size is normal. There is no color  Doppler evidence of an atrial shunt. Lipomatous hypertrophy of the interatrial  septum is noted.     Mitral Valve  The mitral valve leaflets are moderately thickened. There is moderate mitral  annular calcification. There is trace mitral regurgitation. The mean mitral  valve gradient is 4.7 mmHg.     Tricuspid Valve  There is trace tricuspid regurgitation. Right ventricular systolic pressure  could not be approximated due to inadequate tricuspid  regurgitation.     Aortic Valve  The aortic valve is not well visualized. No aortic regurgitation is present.  No hemodynamically significant valvular aortic stenosis.     Pulmonic Valve  There is no pulmonic valvular regurgitation. Normal pulmonic valve velocity.     Vessels  The aortic root is normal size. Normal size ascending aorta. IVC diameter <2.1  cm collapsing >50% with sniff suggests a normal RA pressure of 3 mmHg.     Pericardium  Trivial pericardial effusion.     Rhythm  Sinus rhythm was noted.  ______________________________________________________________________________  MMode/2D Measurements & Calculations     IVSd: 1.0 cm  LVIDd: 4.2 cm  LVIDs: 2.1 cm  LVPWd: 1.1 cm  FS: 50.0 %  LV mass(C)d: 147.0 grams  LV mass(C)dI: 104.8 grams/m2  Ao root diam: 3.0 cm  LA dimension: 4.0 cm  asc Aorta Diam: 2.9 cm  LA/Ao: 1.3  LVOT diam: 2.0 cm  LVOT area: 3.1 cm2  Ao root diam index Ht(cm/m): 2.0  Ao root diam index BSA (cm/m2): 2.1  Asc Ao diam index BSA (cm/m2): 2.1  Asc Ao diam index Ht(cm/m): 1.9  LA Volume (BP): 42.1 ml     LA Volume Index (BP): 30.1 ml/m2  RV Base: 3.3 cm  RWT: 0.52  TAPSE: 2.2 cm     Doppler Measurements & Calculations  MV E max veena: 130.0 cm/sec  MV A max veena: 160.0 cm/sec  MV E/A: 0.81  MV max P.7 mmHg  MV mean P.7 mmHg  MV V2 VTI: 37.8 cm  MVA(VTI): 2.4 cm2  MV dec slope: 685.0 cm/sec2  MV dec time: 0.19 sec  LV V1 max P.9 mmHg  LV V1 max: 149.4 cm/sec  LV V1 VTI: 29.7 cm  SV(LVOT): 92.1 ml  SI(LVOT): 65.7 ml/m2  PA acc time: 0.15 sec  E/E' av.5  Lateral E/e': 18.2     Medial E/e': 16.7     ______________________________________________________________________________  Report approved by: Savanna Qureshi 2024 12:22 PM

## 2024-07-22 NOTE — CONSULTS
[facility] Inpatient Consultation Note    Lila Jenkins MRN# [id number]   Age: 74 year old YOB: 1950     Date of Admission:  7/21/2024    Reason for consult: Recurrent UTI, obstructing proximal ureteral stone        Requesting physician: Rosie Brown MD                 History of Present Illness:   74 diabetic female admitted with distal aortic thrombus causing left foot weakness and numbness. She is now s/p aortic embolectomy and left leg fasciotomies on 7/21. She follows with urology in [place] for known left renal stone, recurrent UTIs. Had stone surgery planned for this week, prior to this admission. Most recent CT angio reviewed in PACS system- moderate proximal and renal hydronephrosis s/t a large left proximal obstructing calculus, also with left renal stone burden. Her creat is WNL at 0.6, WBC improving. UA is concerning for infection. Vitally she is quite stable, remains afebrile.     From admission note: Lila Jenkins is a  74 year old female with PMH Of T2DM on home insulin, HTN, recent UTI w/ nephrolithiasis, h/o tobacco abuse (30 pack year),  who presented to outside hospital with acute onset LLE numbness and pain.  She states this began yesterday around 10 AM.  First she noted numbness that was intermittent however it then became constant and she also developed pain.  It is continued to worsen now.   Also reports elevated blood sugar at home prompting presentation.      She has never had anything like this happen before.  She is not currently on any blood thinners although does report she takes ginkgo [place].  Does not take a baby aspirin.      Does note a 4 to 5-month history of calf pain when walking in left greater than right.  Denies rest pain.  Reports a few episodes when walking where she would develop numbness in her bilateral hips however these episodes were all self-limited.      She lives independently at home and is independent in all of her own activities of daily  living.      She does note that she has been having issues with UTIs for the past 3 weeks.  Per patient and daughter she has kidney stones and was planned for procedure with urology this Wednesday in [facility].  Patient also tells me that she was on antibiotics for her UTI, however stopped taking them as they made her very nauseous and made her vomit.  Tells me she stopped when she had 3 pills left.      On review of chart from hospital in [place], patient did have UA with positive leukocyte Esterase, and CT report notes fat stranding about kidney.      Patient denies recent weight loss, no knowledge of any clotting disorders.  Does report having some chills recently that were so severe that she would wake up at night from them.  This happened she says 1-2 times a week.  no fevers that she is aware of.           Past Medical History:   No past medical history on file.          Past Surgical History:     Past Surgical History:   Procedure Laterality Date    ANGIOGRAM  7/21/2024    Procedure: Angiogram LEFT LEG;  Surgeon: Brandie Ochoa MD;  Location:  OR    EMBOLECTOMY LOWER EXTREMITY Bilateral 7/21/2024    Procedure: Bilateral Femoral Cut Down Aortic Amerron Embolectomy Left Lower Extremity,  Left Leg Fasciotomies LEFT ANTERIOR TIBIAL EMBOLECTOMY AND REPAIR;  Surgeon: Brandie Ochoa MD;  Location:  OR             Social History:     Social History     Socioeconomic History    Marital status: Single     Spouse name: Not on file    Number of children: Not on file    Years of education: Not on file    Highest education level: Not on file   Occupational History    Not on file   Tobacco Use    Smoking status: Not on file    Smokeless tobacco: Not on file   Substance and Sexual Activity    Alcohol use: Not on file    Drug use: Not on file    Sexual activity: Not on file   Other Topics Concern    Not on file   Social History Narrative    Not on file     Social Determinants of Health     Financial Resource Strain:  Not on file   Food Insecurity: Not on file   Transportation Needs: Not on file   Physical Activity: Not on file   Stress: Not on file   Social Connections: Not on file   Interpersonal Safety: Not on file   Housing Stability: Not on file             Family History:   No family history on file.          Immunizations:     There is no immunization history on file for this patient.          Allergies:     Allergies   Allergen Reactions    Pentosan Polysulfate              Medications:     Current Facility-Administered Medications   Medication Dose Route Frequency Provider Last Rate Last Admin    [START ON 7/24/2024] acetaminophen (TYLENOL) tablet 650 mg  650 mg Oral Q4H PRN Rosie Brown MD        acetaminophen (TYLENOL) tablet 975 mg  975 mg Oral Q8H Rosie Brown MD   975 mg at 07/22/24 0816    argatroban (ACOVA) 1 mg/mL ANTICOAGULANT infusion  5 mcg/kg/min Intravenous Continuous Aleksandar Howard [facility] 13.95 mL/hr at 07/22/24 0936 5 mcg/kg/min at 07/22/24 0936    aspirin EC tablet 81 mg  81 mg Oral Daily Rosie Brown MD   81 mg at 07/22/24 0817    benzocaine-menthol (CHLORASEPTIC) 6-10 MG lozenge 1-2 lozenge  1-2 lozenge Buccal Q1H PRN Rosie Brown MD        [START ON 7/24/2024] bisacodyl (DULCOLAX) suppository 10 mg  10 mg Rectal Daily PRN Rosie Brown MD        calcium carbonate (TUMS) chewable tablet 500 mg  500 mg Oral 4x Daily PRN Rosie Brown MD        cefTRIAXone (ROCEPHIN) 1 g vial to attach to  mL bag for ADULTS or NS 50 mL bag for PEDS  1 g Intravenous Q24H Rosie Brown MD   1 g at 07/21/24 2100    glucose gel 15-30 g  15-30 g Oral Q15 Min PRN Rosie Brown MD        Or    dextrose 50 % injection 25-50 mL  25-50 mL Intravenous Q15 Min PRN Rosie Brown MD        Or    glucagon injection 1 mg  1 mg Subcutaneous Q15 Min PRN Rosie Brown MD        ferrous sulfate (FEROSUL) tablet 325 mg  325 mg Oral Daily Rosie Brown MD         HYDROmorphone (DILAUDID) injection 0.2 mg  0.2 mg Intravenous Q2H PRN Rosie Brown MD   0.2 mg at 07/22/24 0944    Or    HYDROmorphone (DILAUDID) injection 0.4 mg  0.4 mg Intravenous Q2H PRN Rosie Brown MD        insulin aspart (NovoLOG) injection (RAPID ACTING)   Subcutaneous Daily with breakfast Sara Christy MD        insulin aspart (NovoLOG) injection (RAPID ACTING)   Subcutaneous Daily with lunch Sara Christy MD        insulin aspart (NovoLOG) injection (RAPID ACTING)   Subcutaneous Daily with supper Sara Christy MD        insulin aspart (NovoLOG) injection (RAPID ACTING)   Subcutaneous With Snacks or Supplements Sara Christy MD        insulin aspart (NovoLOG) injection (RAPID ACTING)  1-7 Units Subcutaneous TID AC Rosie Brown MD   3 Units at 07/22/24 0810    insulin aspart (NovoLOG) injection (RAPID ACTING)  1-5 Units Subcutaneous At Bedtime Rosie Brown MD   3 Units at 07/22/24 0228    insulin detemir (LEVEMIR PEN) injection 20 Units  20 Units Subcutaneous At Bedtime aSra Christy MD        lactated ringers infusion   Intravenous Continuous DichterNoah MD 10 mL/hr at 07/22/24 0757 Rate Change at 07/22/24 0757    lidocaine (LMX4) cream   Topical Q1H PRN Rosie Brown MD        lidocaine 1 % 0.1-1 mL  0.1-1 mL Other Q1H PRN Rosie Brown MD        [START ON 7/23/2024] magnesium hydroxide (MILK OF MAGNESIA) suspension 30 mL  30 mL Oral Daily PRN Rosie Brown MD        methocarbamol (ROBAXIN) tablet 500 mg  500 mg Oral Q6H PRN Rosie Brown MD        naloxone (NARCAN) injection 0.2 mg  0.2 mg Intravenous Q2 Min PRN Aleksandar Howard RPH        Or    naloxone (NARCAN) injection 0.4 mg  0.4 mg Intravenous Q2 Min PRN Aleksandar Howard RPH        Or    naloxone (NARCAN) injection 0.2 mg  0.2 mg Intramuscular Q2 Min PRN Aleksandar Howard RPH        Or    naloxone (NARCAN) injection 0.4 mg  0.4 mg Intramuscular Q2 Min PRN  Aleksandar Howard, [facility]        ondansetron (ZOFRAN ODT) ODT tab 4 mg  4 mg Oral Q6H PRN Rosie Brown MD        Or    ondansetron (ZOFRAN) injection 4 mg  4 mg Intravenous Q6H PRN Rosie Brown MD        oxyCODONE (ROXICODONE) tablet 5 mg  5 mg Oral Q4H PRN Rosie Brown MD        Or    oxyCODONE (ROXICODONE) tablet 10 mg  10 mg Oral Q4H PRN Rosie Brown MD        pantoprazole (PROTONIX) EC tablet 40 mg  40 mg Oral QAM AC Rosie Brown MD        polyethylene glycol (MIRALAX) Packet 17 g  17 g Oral Daily Rosie Brown MD   17 g at 07/22/24 0821    prochlorperazine (COMPAZINE) injection 5 mg  5 mg Intravenous Q6H PRN Rosie Brown MD        Or    prochlorperazine (COMPAZINE) tablet 5 mg  5 mg Oral Q6H PRN Rosie Brown MD        senna-docusate (SENOKOT-S/PERICOLACE) 8.6-50 MG per tablet 1 tablet  1 tablet Oral BID Rosie Brown MD        sodium chloride (PF) 0.9% PF flush 3 mL  3 mL Intracatheter Q8H Rosie Brown MD   3 mL at 07/22/24 0455    sodium chloride (PF) 0.9% PF flush 3 mL  3 mL Intracatheter q1 min prn Rosie Brown MD        sodium phosphate 9 mmol in 250 mL NS intermittent infusion  9 mmol Intravenous Once Lawanda Yip MD        traZODone (DESYREL) half-tab 75 mg  75 mg Oral At Bedtime PRN Sara Christy MD                 Review of Systems:   Comprehensive review of systems from the Admission note dated 7/21/24 at [facility] was reviewed with no changes except per HPI.     Examination:  /65   Pulse 90   Temp 98.9  F (37.2  C) (Oral)   Resp 20   Ht 1.524 m (5')   Wt 49 kg (108 lb 0.4 oz)   SpO2 96%   BMI 21.10 kg/m    General: Alert and oriented, no distress  HEENT: Face symmetric, mucous membranes moist and pink  Eyes: No scleral icterus  Neck: Symmetric  Chest wall: Symmetric  Respiratory: Breathing unlabored, no audible wheezing  Cardiac: Extremities warm and well perfused  Abdomen: soft, non  tender, non distended  Back: No CVA or flank tenderness  Extremities: No evidence of deformities or trauma  Neuro:Grossly non focal  Pysch: Normal mood and affect  Skin: No evident rashes or lesions            Data:     Lab Results   Component Value Date    WBC 13.3 07/22/2024     Lab Results   Component Value Date    RBC 2.13 07/22/2024     Lab Results   Component Value Date    HGB 7.2 07/22/2024     Lab Results   Component Value Date    HCT 21.1 07/22/2024     Lab Results   Component Value Date     07/22/2024     Creatinine   Date Value Ref Range Status   07/22/2024 0.63 0.51 - 0.95 mg/dL Final   ]  Lab Results   Component Value Date    BUN 9.7 07/22/2024       Imaging:  CT reviewed from 7/21/24- in PACS system- large left proximal obstructing calculus with moderate upstream hydronephrosis. Left renal stone burden noted as well.     Impression:  74F with large left proximal ureteral calculus, hydronephrosis, UTI  Admitted with LLE numbness and pain, s/p thrombectomy and fasciotomies   T2 diabetic on insulin, +smoking hx     Plan:  Concern for infected urine upstream from her obstructing calculus- reviewed case with Dr. Byrd. Recommend ureteral stent placement for renal decompression. Unable to pursue PCN placement d/t anticoagulation.   Please keep NPO today in anticipation of procedure this evening       Enedelia Segal PA-C  [facility]   [email or web address]  Text Page (7:30am to 4:30pm)

## 2024-07-22 NOTE — OP NOTE
Operative Note    Name: Lila Jenkins     Location:  OR    Procedure Date:  7/21/2024    PCP:  No primary care provider on file.    Procedure:      Bilateral femoral artery cutdowns. Aortic embolectomy via both groins. Bilateral iliac artery embolectomies. Left lower extremity SFA/popliteal embolectomy. Left lower extremity angiogram via L femoral with nixon embolectomies of L AT, PT and peroneal. 4 Compartment fasciotomies of LLE with exposure of AT and PT arteries. AT embolectomy and primary repair.     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 Bilateral femoral arteries with excellent palpable pulses with strong inflow appreciated after embolectomies.   No thrombus removed from AT during AT embolectomy. No thrombus removed from tibial vessels when embolectomy performed under angiogram guidance.   prior to closing. AT artery with excellent inflow after embolectomy with good back bleeding.       Palpable pulses in L DP and R DP and PT arteries at end of procedure.     Patient on therapeutic argatroban throughout case    Estimated Blood Loss:   500 cc's    Specimens:    ID Type Source Tests Collected by Time Destination   1 : AORTIC THROMBUS Tissue Groin, Left SURGICAL PATHOLOGY EXAM Brandie Ochoa MD 7/21/2024  6:47 PM             Complications:    * No complications entered in OR log *     Brief Clinical Hx:  Is a 74-year-old female who presented with sudden loss of sensation and then motor function of the left foot.  Came to the ER and was noted to have a cool limb with diminished motor and sensory function.  CTA confirmed saddle embolus in the aorta as well as additional emboli in the left leg at the popliteal and proximal  posterior tibial artery as well as long segment occlusion of the anterior tibial artery.  Right leg tibial disease also noted but inline flow to the foot through the right anterior tibial.  The patient has an allergy to medication that I do not recall that cross-reacts with heparin and so she cannot be heparinized.  For this reason she was transferred from [place] to have her care here.  This is a 3-hour emergency flight transfer.    gradually improving    Operative Details:  The patient was brought to the operating room and placed supine on the table.  After intubation ultrasound is performed to evaluate the common femoral arteries and charlotte the femoral bifurcations at both groins.  She was prepped and draped access to her abdomen, both groins, both lower extremities circumferentially.  Oblique incision was made centered over each common femoral bifurcation and extended down through subcutaneous tissue and superficial fascia.  The deep fascia directly over each artery was opened longitudinally and the common femoral arteries on both sides were circumferentially controlled as were the origins of the profunda and SFA.    The vessels were clamped and transverse incisions were made and both common femoral arteries.  Both appear to be extremely healthy vessels.  There was nonpulsatile bleeding from the left groin and only weakly pulsatile bleeding to the right groin.  #6 Jacek catheters were passed up each groin and thrombus was cleared.  Once we had no more thrombus clearing despite bilateral simultaneous Jacek passes, and once we had confirmed excellent pulsatile flow, we, passed a #3 Jacek down the left superficial femoral artery.  We cleared some thrombus material.  We made 2 more passes getting no additional material.    At this point we closed both groin openings with running 6-0 Prolene suture.  Prior to completing her Omero back fashion/the vessels that we completed our repair and restored flow.  There were  excellent Doppler signals and pulses in both groins.    When checking for Doppler signals at the feet we initially had a good posterior tibial Doppler signal on the right but none on the left.  We reclamped and reopen the left groin and found that we had additional thrombus on the left side which we cleared to restore excellent inflow.  We then passed the Jacek once more distally but got no more material.  We repaired the artery but still had only weak or uncertain Doppler signals in that leg.    As you worked we lost pulses in the right groin.  We reopened this and got additional thrombus material once again.  This appeared to be organized thrombus.  We cleared this with Jacek catheters and once again had excellent inflow.  We once again repaired the artery and had a good signal at the foot.    A third time we lost flow in the right leg and once again reopened and this time ran an adherent clot catheter with clearance of additional material and excellent inflow.  Once we were certain there was no more debris on either side we repaired the arteries once again.    At this point we had an excellent palpable pulse at the PT level on the right.  On the left side we had very weak signals.    We introduced a micropuncture sheath into the left SFA and performed an angiogram of the left leg    Findings: Patent left SFA and popliteal.  Occlusion of anterior tibial artery just beyond its origin.  Tibioperoneal trunk is patent.  Posterior tibial artery has a critical stenosis just beyond its origin and then has sluggish but patent flow down toward the ankle.  Peroneal artery appears to be patent.  There is a blush to the distal peroneal artery suggesting that we had a microperforation of to the peroneal artery from her Jacek balloon.    We tried to use fluoroscopy with a Jacek balloons to clear some of the clot from the posterior tibial artery.  We were able to direct our balloon catheter under fluoroscopy down the  posterior tibial artery and pull it back but we did not get any clear debris from the artery.    At this point we felt that we should explore the arteries little bit more effectively by cutting down on them.  We felt that the patient would need fasciotomies anyways given her initial presentation.  We performed both medial and lateral incisions of the lower leg and extended down through subcutaneous tissue, fascia, and on the medial side took down the soleus muscle.  On the medial side this allowed us to expose the posterior tibial artery.  We found that it actually had an excellent pulse in it.  We able to follow this pulse all the way down toward the ankle at the distal extent of our fasciotomy.  At this point we perceive that we had a pulse at the ankle as well.    On the lateral side we open the fascia of both the anterior compartment and the lateral compartment and were able to protect the peroneal nerve.  We identified this nerve in the lateral compartment.  Between the muscle bellies and the anterior compartment we were able to identify and control the anterior tibial artery.  He did not have a good pulse.  We confirmed that it was a very healthy-appearing vessel and so we opened it transversely.  There was fairly good bleeding from it.  We passed a #2 Jacek catheter distally and brought it back without any clear debris.  We passed it proximally as well and brought it back without any clear debris but with much improved excellent pulsatile flow.  We repaired this artery with three 7-0 Prolene interrupted sutures.    There was an excellent pulse in the anterior tibial at this point we had a palpable dorsalis pedis pulse on the foot.  We felt that we had accomplished what we could.  We took time to ensure we have good hemostasis in both fasciotomy incisions.  We placed interrupted nylon sutures in each that we would be able to close her fasciotomies if she did not develop swelling within 24 hours.  The groin  wounds were irrigated and closed with 2-0 Vicryl for fascia, 2-0 Vicryl for subcu tissue, and 4 Monocryl for skin.  Dermabond was applied over top with Tegaderms.    Overall the procedure has been well-tolerated with significant improvement in the patient's blood flow.      Brandie Ochoa MD     Date: 7/21/2024  Time:7:51 PM

## 2024-07-22 NOTE — ANESTHESIA CARE TRANSFER NOTE
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    Anesthesia Type:   No value filed.     Note:    Oropharynx: oropharynx clear of all foreign objects and spontaneously breathing  Level of Consciousness: awake  Oxygen Supplementation: face mask  Level of Supplemental Oxygen (L/min / FiO2): 6  Independent Airway: airway patency satisfactory and stable  Dentition: dentition unchanged  Vital Signs Stable: post-procedure vital signs reviewed and stable  Report to RN Given: handoff report given  Patient transferred to: ICU    ICU Handoff: Call for PAUSE to initiate/utilize ICU HANDOFF, Identified Patient, Identified Responsible Provider, Reviewed the Pertinent Medical History, Discussed Surgical Course, Reviewed Intra-OP Anesthesia Management and Issues during Anesthesia, Set Expectations for Post Procedure Period and Allowed Opportunity for Questions and Acknowledgement of Understanding      Vitals:  Vitals Value Taken Time   /63 07/21/24 1921   Temp     Pulse 90 07/21/24 1923   Resp 59 07/21/24 1923   SpO2 100 % 07/21/24 1923   Vitals shown include unfiled device data.    Electronically Signed By: Nette Storm  July 21, 2024  7:25 PM

## 2024-07-22 NOTE — ANESTHESIA POSTPROCEDURE EVALUATION
Patient: Lila Jenkins    Procedure: Procedure(s):  Bilateral Femoral Cut Down Aortic Amerron Embolectomy Left Lower Extremity,  Left Leg Fasciotomies LEFT ANTERIOR TIBIAL EMBOLECTOMY AND REPAIR  Angiogram LEFT LEG       Anesthesia Type:  No value filed.    Note:     Postop Pain Control: Uneventful            Sign Out: Well controlled pain   PONV: No   Neuro/Psych: Uneventful            Sign Out: Acceptable/Baseline neuro status   Airway/Respiratory: Uneventful            Sign Out: Acceptable/Baseline resp. status   CV/Hemodynamics: Uneventful            Sign Out: Acceptable CV status; No obvious hypovolemia; No obvious fluid overload   Other NRE:    DID A NON-ROUTINE EVENT OCCUR? No           Last vitals:  Vitals:    07/21/24 2300 07/21/24 2330 07/22/24 0000   BP: 132/62 127/58 (!) 140/58   Pulse: 93 98 100   Resp: 22 23 26   Temp:      SpO2: 97% 99% 98%       Electronically Signed By: Lindsay Esteves MD, MD  July 22, 2024  1:30 AM

## 2024-07-23 ENCOUNTER — DOCUMENTATION ONLY (OUTPATIENT)
Dept: ORTHOPEDICS | Facility: CLINIC | Age: 74
End: 2024-07-23
Payer: MEDICARE

## 2024-07-23 ENCOUNTER — APPOINTMENT (OUTPATIENT)
Dept: CT IMAGING | Facility: CLINIC | Age: 74
DRG: 271 | End: 2024-07-23
Payer: MEDICARE

## 2024-07-23 LAB
ANION GAP SERPL CALCULATED.3IONS-SCNC: 10 MMOL/L (ref 7–15)
APTT PPP: 84 SECONDS (ref 22–38)
BACTERIA UR CULT: NO GROWTH
BUN SERPL-MCNC: 8.2 MG/DL (ref 8–23)
CALCIUM SERPL-MCNC: 7.5 MG/DL (ref 8.8–10.4)
CHLORIDE SERPL-SCNC: 104 MMOL/L (ref 98–107)
CORTIS SERPL-MCNC: 20.6 UG/DL
CREAT SERPL-MCNC: 0.72 MG/DL (ref 0.51–0.95)
EGFRCR SERPLBLD CKD-EPI 2021: 88 ML/MIN/1.73M2
ERYTHROCYTE [DISTWIDTH] IN BLOOD BY AUTOMATED COUNT: 13.4 % (ref 10–15)
FACTOR 2 INTERPRETATION: NORMAL
FACTOR V INTERPRETATION: NORMAL
GLUCOSE BLDC GLUCOMTR-MCNC: 130 MG/DL (ref 70–99)
GLUCOSE BLDC GLUCOMTR-MCNC: 152 MG/DL (ref 70–99)
GLUCOSE BLDC GLUCOMTR-MCNC: 169 MG/DL (ref 70–99)
GLUCOSE BLDC GLUCOMTR-MCNC: 255 MG/DL (ref 70–99)
GLUCOSE BLDC GLUCOMTR-MCNC: 269 MG/DL (ref 70–99)
GLUCOSE BLDC GLUCOMTR-MCNC: 335 MG/DL (ref 70–99)
GLUCOSE BLDC GLUCOMTR-MCNC: 393 MG/DL (ref 70–99)
GLUCOSE SERPL-MCNC: 137 MG/DL (ref 70–99)
HCO3 SERPL-SCNC: 22 MMOL/L (ref 22–29)
HCT VFR BLD AUTO: 21.2 % (ref 35–47)
HGB BLD-MCNC: 7 G/DL (ref 11.7–15.7)
LAB DIRECTOR COMMENTS: NORMAL
LAB DIRECTOR DISCLAIMER: NORMAL
LAB DIRECTOR INTERPRETATION: NORMAL
LAB DIRECTOR METHODOLOGY: NORMAL
LAB DIRECTOR RESULTS: NORMAL
MAGNESIUM SERPL-MCNC: 2 MG/DL (ref 1.7–2.3)
MCH RBC QN AUTO: 32.4 PG (ref 26.5–33)
MCHC RBC AUTO-ENTMCNC: 33 G/DL (ref 31.5–36.5)
MCV RBC AUTO: 98 FL (ref 78–100)
PHOSPHATE SERPL-MCNC: 2.3 MG/DL (ref 2.5–4.5)
PLATELET # BLD AUTO: 570 10E3/UL (ref 150–450)
POTASSIUM SERPL-SCNC: 4 MMOL/L (ref 3.4–5.3)
POTASSIUM SERPL-SCNC: 4 MMOL/L (ref 3.4–5.3)
RBC # BLD AUTO: 2.16 10E6/UL (ref 3.8–5.2)
SODIUM SERPL-SCNC: 136 MMOL/L (ref 135–145)
SPECIMEN DESCRIPTION: NORMAL
WBC # BLD AUTO: 11.2 10E3/UL (ref 4–11)

## 2024-07-23 PROCEDURE — G0452 MOLECULAR PATHOLOGY INTERPR: HCPCS | Mod: 26 | Performed by: STUDENT IN AN ORGANIZED HEALTH CARE EDUCATION/TRAINING PROGRAM

## 2024-07-23 PROCEDURE — 83735 ASSAY OF MAGNESIUM: CPT | Performed by: PHYSICIAN ASSISTANT

## 2024-07-23 PROCEDURE — 250N000011 HC RX IP 250 OP 636: Mod: JZ

## 2024-07-23 PROCEDURE — 85041 AUTOMATED RBC COUNT: CPT

## 2024-07-23 PROCEDURE — 81240 F2 GENE: CPT | Performed by: INTERNAL MEDICINE

## 2024-07-23 PROCEDURE — 99223 1ST HOSP IP/OBS HIGH 75: CPT | Performed by: INTERNAL MEDICINE

## 2024-07-23 PROCEDURE — 84132 ASSAY OF SERUM POTASSIUM: CPT | Performed by: PHYSICIAN ASSISTANT

## 2024-07-23 PROCEDURE — 74178 CT ABD&PLV WO CNTR FLWD CNTR: CPT | Mod: MG

## 2024-07-23 PROCEDURE — 36415 COLL VENOUS BLD VENIPUNCTURE: CPT | Performed by: PHYSICIAN ASSISTANT

## 2024-07-23 PROCEDURE — 80048 BASIC METABOLIC PNL TOTAL CA: CPT | Performed by: STUDENT IN AN ORGANIZED HEALTH CARE EDUCATION/TRAINING PROGRAM

## 2024-07-23 PROCEDURE — 36415 COLL VENOUS BLD VENIPUNCTURE: CPT

## 2024-07-23 PROCEDURE — 84100 ASSAY OF PHOSPHORUS: CPT | Performed by: SURGERY

## 2024-07-23 PROCEDURE — 82533 TOTAL CORTISOL: CPT | Performed by: INTERNAL MEDICINE

## 2024-07-23 PROCEDURE — 85730 THROMBOPLASTIN TIME PARTIAL: CPT

## 2024-07-23 PROCEDURE — 250N000011 HC RX IP 250 OP 636: Performed by: SURGERY

## 2024-07-23 PROCEDURE — 36415 COLL VENOUS BLD VENIPUNCTURE: CPT | Performed by: INTERNAL MEDICINE

## 2024-07-23 PROCEDURE — 99231 SBSQ HOSP IP/OBS SF/LOW 25: CPT | Mod: 24

## 2024-07-23 PROCEDURE — 250N000009 HC RX 250: Performed by: SURGERY

## 2024-07-23 PROCEDURE — 250N000013 HC RX MED GY IP 250 OP 250 PS 637

## 2024-07-23 PROCEDURE — 74174 CTA ABD&PLVS W/CONTRAST: CPT | Mod: MG

## 2024-07-23 PROCEDURE — 250N000011 HC RX IP 250 OP 636

## 2024-07-23 PROCEDURE — 250N000013 HC RX MED GY IP 250 OP 250 PS 637: Performed by: STUDENT IN AN ORGANIZED HEALTH CARE EDUCATION/TRAINING PROGRAM

## 2024-07-23 PROCEDURE — 120N000001 HC R&B MED SURG/OB

## 2024-07-23 PROCEDURE — 99233 SBSQ HOSP IP/OBS HIGH 50: CPT | Performed by: STUDENT IN AN ORGANIZED HEALTH CARE EDUCATION/TRAINING PROGRAM

## 2024-07-23 PROCEDURE — 250N000011 HC RX IP 250 OP 636: Mod: JZ | Performed by: SURGERY

## 2024-07-23 RX ORDER — NICOTINE POLACRILEX 4 MG
15-30 LOZENGE BUCCAL
Status: DISCONTINUED | OUTPATIENT
Start: 2024-07-23 | End: 2024-07-30 | Stop reason: HOSPADM

## 2024-07-23 RX ORDER — IOPAMIDOL 755 MG/ML
72 INJECTION, SOLUTION INTRAVASCULAR ONCE
Status: COMPLETED | OUTPATIENT
Start: 2024-07-23 | End: 2024-07-23

## 2024-07-23 RX ORDER — DEXTROSE MONOHYDRATE 25 G/50ML
25-50 INJECTION, SOLUTION INTRAVENOUS
Status: DISCONTINUED | OUTPATIENT
Start: 2024-07-23 | End: 2024-07-30 | Stop reason: HOSPADM

## 2024-07-23 RX ADMIN — ACETAMINOPHEN 975 MG: 325 TABLET, FILM COATED ORAL at 14:46

## 2024-07-23 RX ADMIN — ARGATROBAN 5 MCG/KG/MIN: 50 INJECTION, SOLUTION INTRAVENOUS at 13:01

## 2024-07-23 RX ADMIN — CEFTRIAXONE SODIUM 1 G: 1 INJECTION, POWDER, FOR SOLUTION INTRAMUSCULAR; INTRAVENOUS at 21:52

## 2024-07-23 RX ADMIN — ARGATROBAN 5 MCG/KG/MIN: 50 INJECTION, SOLUTION INTRAVENOUS at 20:24

## 2024-07-23 RX ADMIN — FERROUS SULFATE TAB 325 MG (65 MG ELEMENTAL FE) 325 MG: 325 (65 FE) TAB at 09:06

## 2024-07-23 RX ADMIN — SENNOSIDES AND DOCUSATE SODIUM 1 TABLET: 50; 8.6 TABLET ORAL at 09:06

## 2024-07-23 RX ADMIN — ACETAMINOPHEN 975 MG: 325 TABLET, FILM COATED ORAL at 21:53

## 2024-07-23 RX ADMIN — SODIUM CHLORIDE 80 ML: 9 INJECTION, SOLUTION INTRAVENOUS at 20:40

## 2024-07-23 RX ADMIN — OXYCODONE HYDROCHLORIDE 10 MG: 5 TABLET ORAL at 01:22

## 2024-07-23 RX ADMIN — POLYETHYLENE GLYCOL 3350 17 G: 17 POWDER, FOR SOLUTION ORAL at 09:06

## 2024-07-23 RX ADMIN — ASPIRIN 81 MG: 81 TABLET, COATED ORAL at 09:06

## 2024-07-23 RX ADMIN — ARGATROBAN 5 MCG/KG/MIN: 50 INJECTION, SOLUTION INTRAVENOUS at 05:33

## 2024-07-23 RX ADMIN — ARGATROBAN 5 MCG/KG/MIN: 50 INJECTION, SOLUTION INTRAVENOUS at 09:08

## 2024-07-23 RX ADMIN — ARGATROBAN 5 MCG/KG/MIN: 50 INJECTION, SOLUTION INTRAVENOUS at 02:09

## 2024-07-23 RX ADMIN — IOPAMIDOL 72 ML: 755 INJECTION, SOLUTION INTRAVENOUS at 20:41

## 2024-07-23 RX ADMIN — ACETAMINOPHEN 975 MG: 325 TABLET, FILM COATED ORAL at 05:44

## 2024-07-23 RX ADMIN — POTASSIUM & SODIUM PHOSPHATES POWDER PACK 280-160-250 MG 1 PACKET: 280-160-250 PACK at 16:58

## 2024-07-23 RX ADMIN — PANTOPRAZOLE SODIUM 40 MG: 40 TABLET, DELAYED RELEASE ORAL at 09:06

## 2024-07-23 RX ADMIN — ARGATROBAN 5 MCG/KG/MIN: 50 INJECTION, SOLUTION INTRAVENOUS at 16:33

## 2024-07-23 RX ADMIN — POTASSIUM & SODIUM PHOSPHATES POWDER PACK 280-160-250 MG 1 PACKET: 280-160-250 PACK at 21:53

## 2024-07-23 ASSESSMENT — ACTIVITIES OF DAILY LIVING (ADL)
ADLS_ACUITY_SCORE: 44
ADLS_ACUITY_SCORE: 51
ADLS_ACUITY_SCORE: 51
ADLS_ACUITY_SCORE: 43
ADLS_ACUITY_SCORE: 51
ADLS_ACUITY_SCORE: 43
ADLS_ACUITY_SCORE: 43
ADLS_ACUITY_SCORE: 51
ADLS_ACUITY_SCORE: 51
ADLS_ACUITY_SCORE: 43
ADLS_ACUITY_SCORE: 43
ADLS_ACUITY_SCORE: 51
ADLS_ACUITY_SCORE: 43
ADLS_ACUITY_SCORE: 51
ADLS_ACUITY_SCORE: 51
ADLS_ACUITY_SCORE: 43
ADLS_ACUITY_SCORE: 51
ADLS_ACUITY_SCORE: 43
ADLS_ACUITY_SCORE: 43

## 2024-07-23 NOTE — PROGRESS NOTES
"[facility]    Medicine Progress Note - Hospitalist Service    Date of Admission:  7/21/2024    Assessment & Plan   Lila Jenkins is a 74 year old female with past medical history significant for type II DM, HTN, recurrent UTI, nephrolithiasis admitted on 7/21/2024 with acute limb ischemia.      Pt presented to an outside hospital on 7/21 with acute onset left lower extremity numbness and pain. 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 LLE with occlusion noted in below knee popliteal artery with scattered runoff seen to the LLE, no inline flow to the foot. She has bety 2B acute limb ischemia resulting from acute aortic occlusion with distal embolization to LLE tibial vessels. She was transferred to [facility] for vascular surgery consultation. Pt was taken emergently for Aortic embolectomy, left anterior tibial embolectomy, bilateral iliac artery embolization and 4 compartment fasciotomies of the LLE. The procedure was well tolerated. The patient was extubated and stable upon arrival to the ICU. The hospitalist service was consulted for medical co-management.      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 with [place] 2B ischemia   S/p Aortic embolectomy, left anterior tibial embolectomy, bilateral iliac artery embolization and 4 compartment fasciotomies of the LLE (7/21/24)   - Post-operative cares, dressing changes, anticoagulation, pain control, activity restrictions per vascular surgery    - Pt currently anticoagulated with argatroban due to allergy to heparin component, continue per vascular   - CTA chest and Echo ordered per vascular surgery for hypercoag work-up, consider heme/onc consultation  - per vascular: \"Pt with foot drop and ongoing neuro deficits, not surprising " "given >24 hours ischemia time, these will take time to recover and may not recover at all.\"  - Orthotics consult for foot drop boot   - consult heme onc for arterial clot  - will need a minimum of 6 months of anticoagulation. Plan coumadin.      Type II DM   Hyperglycemia  Very poorly controlled. Hemoglobin A1C is 10.9% on admission. Blood sugars in the high-200-500 range currently.   PTA regimen includes Detemir 12 units at bedtime, Metformin 750mg BID, and Berberine.   - Resume Detemir, 12 units   - MDSSI   - Hypoglycemia protocol   - Hold PTA metformin and berberine      Acute Blood Loss Anemia   Reactive Thrombocytosis   Hemoglobin on admission was around 9. Dropped to 7.2 post-operatively   - Conditional PRBCs ordered for Hgb <7   - Iron tabs started per vascular   - Monitor hemoglobin Q12H      Supratherapeutic INR   Pt was given argatroban and bivalirudin at the OSH which can elevate INR. She is not on any anticoagulation PTA   - Re-check INR.      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 Additional left renal calyceal calculi. Air in the left renal calices along with perinephric stranding concern for emphysematous pyelonephritis. Non-obstructing R nephrolithiasis.   * Urology consulted and recommending ureteral stent placement today at 7pm given concern for infected urine/pus behind the stone in her left prox ureter.   - Recently on antibiotics, continue with Ceftriaxone for now   - Follow urine cultures   - Appreciate urology recommendations      Prolonged QT  QTc on admission is 499   - Avoid QT prolonging medications   - Telemetry      Leukocytosis   WBC initially within normal limits at 1.0, but then acutely koffi to 21.2 on admission, now down to 13.3. "   - Monitor   - On Ceftriaxone as noted above for possible UTI.      Hypokalemia  Hypomagnesemia  Hypophosphatemia  - Replacement protocols ordered     Supplement Use   Pt takes a number of supplements including Alpha lipoic acid, Ashwaganda, Berberine, Bilberry, Biotin, Cinnamon, Collagen, Echinacea, Ginko Biloba, Culturell, Lecithin, Moringa Oleifera, Multivitamin, Vitamin C, and Vitamin D   - Will hold all supplements while hospitalized   - Pt will need long-term anticoagulation with coumadin, will need to discuss with pharmacy if this could interact with any of these supplements      Adrenal nodule   CT chest showed 4.6cm left adrenal nodule. Review of outside paperwork suggests this has been previously identified and stable from prior   - Continue outpatient evaluation          Diet: Moderate Consistent Carb (60 g CHO per Meal) Diet    DVT Prophylaxis: argatroban  Hui Catheter: PRESENT, indication: Surgical procedure, Surgical procedure  Lines: None     Cardiac Monitoring: None  Code Status: Full Code      Clinically Significant Risk Factors        # Hypokalemia: Lowest K = 3.3 mmol/L in last 2 days, will replace as needed     # Hypomagnesemia: Lowest Mg = 1.3 mg/dL in last 2 days, will replace as needed   # Hypoalbuminemia: Lowest albumin = 2.9 g/dL at 7/21/2024  8:05 PM, will monitor as appropriate  # Coagulation Defect: INR = 3.93 (Ref range: 0.85 - 1.15) and/or PTT = 84 Seconds (Ref range: 22 - 38 Seconds), will monitor for bleeding              #Precipitous drop in Hgb/Hct: Lowest Hgb this hospitalization: 7 g/dL. Will continue to monitor and treat/transfuse as appropriate.    # DMII: A1C = 10.9 % (Ref range: <5.7 %) within past 6 months, PRESENT ON ADMISSION      # Financial/Environmental Concerns: none         Disposition Plan     Medically Ready for Discharge: Anticipated in 2-4 Days             Flavio Borden MD  Hospitalist Service  [facility]  Securely message with  [employer]line (more info)  Text page via [facility] Paging/Directory   ______________________________________________________________________    Interval History   Leg pain. Most noted in left foot. Plan for fasciotomy closure tomorrow. Heme saw today. Discussed DM management, further hospital course, and vascular and urology plan. She was in agreement.     Physical Exam   Vital Signs: Temp: 98.6  F (37  C) Temp src: Oral BP: 103/51 Pulse: 92   Resp: 16 SpO2: 92 % O2 Device: None (Room air) Oxygen Delivery: 1 LPM  Weight: 110 lbs 10.73 oz    Constitutional: Awake, alert, cooperative, no apparent distress  Respiratory: Clear to auscultation bilaterally, no crackles or wheezing  Cardiovascular: Regular rate and rhythm, normal S1 and S2, and no murmur noted  GI: Normal bowel sounds, soft, non-distended, non-tender  Skin/Integumen: No rashes, no cyanosis, no edema of exposed skin.   Other:       Medical Decision Making       51 MINUTES SPENT BY ME on the date of service doing chart review, history, exam, documentation & further activities per the note.      Data   ------------------------- PAST 24 HR DATA REVIEWED -----------------------------------------------    I have personally reviewed the following data over the past 24 hrs:    11.2 (H)  \   7.0 (L)   / 570 (H)     136 104 8.2 /  269 (H)   4.0; 4.0 22 0.72 \     INR:  N/A PTT:  84 (H)   D-dimer:  N/A Fibrinogen:  N/A       Imaging results reviewed over the past 24 hrs:   Recent Results (from the past 24 hour(s))   XR Surgery DELFINO L/T 5 Min Fluoro w Stills    Narrative    This exam was marked as non-reportable because it will not be read by a   radiologist or a [employer] non-radiologist provider.

## 2024-07-23 NOTE — ANESTHESIA PREPROCEDURE EVALUATION
Anesthesia Pre-Procedure Evaluation    Patient: Lila Jenkins   MRN: [id number] : 1950        Procedure : Procedure(s):  CYSTOSCOPY LEFT URETERAL STENT PLACEMENT          No past medical history on file.   Past Surgical History:   Procedure Laterality Date    ANGIOGRAM  2024    Procedure: Angiogram LEFT LEG;  Surgeon: Brandie Ochoa MD;  Location: [facility] OR    EMBOLECTOMY LOWER EXTREMITY Bilateral 2024    Procedure: Bilateral Femoral Cut Down Aortic Amerron Embolectomy Left Lower Extremity,  Left Leg Fasciotomies LEFT ANTERIOR TIBIAL EMBOLECTOMY AND REPAIR;  Surgeon: Brandie Ochoa MD;  Location: [facility] OR    IR OR ANGIOGRAM  2024      Allergies   Allergen Reactions    Pentosan Polysulfate       Social History     Tobacco Use    Smoking status: Not on file    Smokeless tobacco: Not on file   Substance Use Topics    Alcohol use: Not on file      Wt Readings from Last 1 Encounters:   24 49 kg (108 lb 0.4 oz)        Anesthesia Evaluation   Pt has had prior anesthetic. Type: General.    No history of anesthetic complications       ROS/MED HX  ENT/Pulmonary:     (+)                tobacco use,                     (-) sleep apnea   Neurologic:       Cardiovascular:       METS/Exercise Tolerance:     Hematologic:     (+) History of blood clots (s/p thrombectomy and fasciotomy),               Musculoskeletal:       GI/Hepatic:    (-) GERD   Renal/Genitourinary:    (-) renal disease   Endo:     (+)  type II DM,                    Psychiatric/Substance Use:       Infectious Disease:       Malignancy:       Other:            Physical Exam    Airway        Mallampati: II   TM distance: > 3 FB   Neck ROM: full   Mouth opening: > 3 cm    Respiratory Devices and Support         Dental       (+) Edentulous      Cardiovascular          Rhythm and rate: regular     Pulmonary           breath sounds clear to auscultation           OUTSIDE LABS:  CBC:   Lab Results   Component Value Date    WBC 13.3 (H) 2024  "   WBC 21.2 (H) 07/21/2024    HGB 7.2 (L) 07/22/2024    HGB 9.1 (L) 07/21/2024    HCT 21.1 (L) 07/22/2024    HCT 26.8 (L) 07/21/2024     (H) 07/22/2024     (H) 07/21/2024     BMP:   Lab Results   Component Value Date     07/22/2024     (L) 07/21/2024    POTASSIUM 3.9 07/22/2024    POTASSIUM 3.3 (L) 07/22/2024    CHLORIDE 104 07/22/2024    CHLORIDE 98 07/21/2024    CO2 22 07/22/2024    CO2 22 07/21/2024    BUN 9.7 07/22/2024    BUN 11.2 07/21/2024    CR 0.63 07/22/2024    CR 0.59 07/21/2024     (H) 07/22/2024     (H) 07/22/2024     COAGS:   Lab Results   Component Value Date    PTT 84 (H) 07/22/2024    INR 3.93 (H) 07/22/2024    FIBR 380 07/21/2024     POC: No results found for: \"BGM\", \"HCG\", \"HCGS\"  HEPATIC:   Lab Results   Component Value Date    ALBUMIN 2.9 (L) 07/21/2024    PROTTOTAL 5.8 (L) 07/21/2024    ALT 16 07/21/2024    AST 24 07/21/2024    ALKPHOS 120 07/21/2024    BILITOTAL 0.2 07/21/2024     OTHER:   Lab Results   Component Value Date    LACT 1.1 07/21/2024    A1C 10.9 (H) 07/21/2024    ARTURO 6.6 (L) 07/22/2024    PHOS 2.4 (L) 07/22/2024    MAG 2.1 07/22/2024       Anesthesia Plan    ASA Status:  3       Anesthesia Type: General.     - Airway: LMA              Consents    Anesthesia Plan(s) and associated risks, benefits, and realistic alternatives discussed. Questions answered and patient/representative(s) expressed understanding.     - Discussed: Risks, Benefits and Alternatives for BOTH SEDATION and the PROCEDURE were discussed     - Discussed with:  Patient            Postoperative Care            Comments:    Other Comments: On argatroban gtt  Glc 151           Shannan Teran MD    I have reviewed the pertinent notes and labs in the chart from the past 30 days and (re)examined the patient.  Any updates or changes from those notes are reflected in this note.             "

## 2024-07-23 NOTE — ANESTHESIA PROCEDURE NOTES
Airway       Patient location during procedure: OR       Procedure Start/Stop Times: 7/22/2024 8:11 PM  Staff -        Anesthesiologist:  Shannan Teran MD       CRNA: Beto Guerra APRN CRNA       Performed By: CRNA  Consent for Airway        Urgency: elective  Indications and Patient Condition       Indications for airway management: juan josé-procedural       Induction type:intravenous       Mask difficulty assessment: 0 - not attempted    Final Airway Details       Final airway type: supraglottic airway    Supraglottic Airway Details        Type: LMA       Brand: I-Gel       LMA size: 4    Post intubation assessment        Placement verified by: capnometry, equal breath sounds and chest rise        Number of attempts at approach: 1       Number of other approaches attempted: 0       Secured with: tape       Ease of procedure: easy       Dentition: Intact and Unchanged    Medication(s) Administered   Medication Administration Time: 7/22/2024 8:11 PM

## 2024-07-23 NOTE — PROGRESS NOTES
Date & Time: 7/22/24, 6111-9447   Surgery/POD#: POD0/1 cystoscopy w/ retrograde pyelogram and L ureteral stent placement; POD1/2 aortic embolectomy via B femoral cutdowns with embolectomy of LLE and 4 compartment fasciotomies  Behavior & Aggression: Green  Fall Risk: Yes  Orientation: AO4  ABNL VS/O2: VSS on RA  ABNL Labs: q4h BG checks  Pain Management: Oxy x1  Bowel/Bladder: Hui w/ milky urine, flatus+  Wounds/incision: LLE with gauze and boot orthotic, skin warm, +2 pedal pulse, able to wiggle toes, tingling present  Diet: Currently no diet order, tolerating ice chips  Activity Level: Bedrest  Anticipated DC Date: Pending improvement  Significant Information: Argatroban drip infusing at 14 ml/hr, request refills from pharmacy as needed

## 2024-07-23 NOTE — PROGRESS NOTES
VASCULAR SURGERY PROGRESS NOTE    Subjective:  Resting comfortably in bed, able to move toes regularly, decreased sensation in the left foot but improving. Able to lift leg off bed and continues to improve.     Objective:  Intake/Output Summary (Last 24 hours) at 7/23/2024 0812  Last data filed at 7/23/2024 0504  Gross per 24 hour   Intake 1427.9 ml   Output 1050 ml   Net 377.9 ml     PHYSICAL EXAM:  /51 (BP Location: Right arm)   Pulse 92   Temp 98.6  F (37  C) (Oral)   Resp 16   Ht 1.524 m (5')   Wt 50.2 kg (110 lb 10.7 oz)   SpO2 92%   BMI 21.61 kg/m       Gen: Awake, alert, NAD  CV: RRR per DP pulse  Resp: NLB on NC  Incision: c/d/I in bilateral groins  Ext: WWP  Vascular: LLE with palpable DP pulses.   RLE with palpable DP and PT pulses.   Neuro: Wiggles toes equally BLE.   Sensation to light touch diminished on L foot compared to R. 4/5 plantar flexion and 4/5 dorsiflexion.   Fasciotomies changed today - muscle viable and well appearing, no bleeding appreciated.                 ASSESSMENT:  74 year old female with  past medical history significant for type II DM, HTN, recurrent UTI, nephrolithiasis admitted on 7/21/2024 with acute limb ischemia 2/2 aortic occlusion with distal embolization to LLE now s/p aortic embolectomy via bilateral femoral cutdowns with embolectomy of LLE and 4 compartment fasciotomies. Doing well this am with improved motor function.       PLAN:  -continue argatroban  - heme/onc consult for hypercoaguable workup with negative echo  -continue iron supplementation, HGB 7.0 today  -found to have 4.6 cm adrenal nodule on CTA, follow-up outpatient  -left ureteral stent placement on 7/22, on abx for UTI  -ECHO completed   -possible OR on 7/24 for fasciotomy closure  -continue to wear orthotics for foot drop  -appreciate all teams involved and appreciate hospitalist comanagement of medical comorbidities     Evelyn Buck NP  VASCULAR SURGERY       STAFF: As above.  Excellent  distal perfusion.  Still with weakness of foot likely due to ischemic neuropathy which may or may not improve with time.  Fasciotomy sites look good but unable to close at bedside due to discomfort.  Plan operative closure tomorrow under general anesthetic.    Etiology of aortic occlusion is still unclear.  CTA of the chest essentially normal.  Echocardiogram unremarkable with normal cardiac function.    Underwent urology procedure yesterday for stone retrieval with stone retrieval as a likely cause of her UTI.  On IV Rocephin    Patient will need anticoagulation at least for 3 to 6 months.  Insurance will not cover DOVAC thus will be placed on Coumadin.    Plan anticoagulation workup.         Ken Corrales MD

## 2024-07-23 NOTE — PROGRESS NOTES
"[facility]    Urology Progress Note     Assessment & Plan   Lila Jenkins is a 74 year old female s/p recent aortic embolectomy, also with 8 mm left UPJ stone with hydronephrosis and possible UTI. Now s/p placement left ureteral stent 7/22/24 with Dr Byrd.     Interval History   Tolerating stent well -- \"I forget that it's even there\". Currently with ross draining very cloudy yellow urine.     Urine culture resulted negative. On Rocephin. Plans for possible OR tomorrow for fasciotomy closure per vascular surg.     AVSS  WBC 13.3 --> 11.2  UOP via ross 200 / 1050 ml    Plan:   - Continue ureteral stent -- tolerating well  - Recommend 7-10 day treatment course of antibiotics for UTI given urine appearance s/p stent placement   - Ok for ross removal prior to discharge from urology standpoint; timing per primary service (appears ross initially placed for I&Os rather than urinary retention)  - Follow up with local urologist in [place], [place] for definitive stone management. Stones should be treated and stent removed within 3 months. AVS updated. Patient expresses understanding, has an appointment scheduled with her urologist for August already.     Urology will sign off for now. Please call with any questions or concerns.     Alyssa Maldonado PA-C  [facility]  Pager: [phone]  Office: [phone]      OBJECTIVE:          /51 (BP Location: Right arm)   Pulse 92   Temp 98.6  F (37  C) (Oral)   Resp 16   Ht 1.524 m (5')   Wt 50.2 kg (110 lb 10.7 oz)   SpO2 92%   BMI 21.61 kg/m             General appearance shows no deformaties and good grooming in no acute distress.  HEAD, EARS, NOSE, MOUTH, AND THROAT: atraumatic, normocephalic  CARDIAC: skin well perfused  RESPIRATORY: breathing unlabored  : Ross draining very cloudy/milky yellow urine, flowing easily  EXT: LLE elevated and wrapped   SKIN/HAIR/NAILS: no visible rashes  NEUROLOGIC: no focal " deficits  PSYCHIATRIC: Speech, mood, and affect normal    Alyssa Maldonado PA-C  [facility]   Pager: [phone]  Office: [phone]

## 2024-07-23 NOTE — PROGRESS NOTES
Urology    Stent placed    Return to floor, resume orders per Vascular and medicine teams    Mohit Byrd MD  9:00 PM  07/22/24

## 2024-07-23 NOTE — CONSULTS
"SPIRITUAL HEALTH SERVICES Consult Not    [facility]  Gen Surg     Referral Source/Reason for Visit: [facility] consult request for emotional and spiritual support.    Summary and Recommendations -  Pt is a practicing Islam. She has been in touch with her rivas community via family.  Pt indicates that she will have surgery tomorrow and would appreciate a visit for prayer prior to procedure.    Plan: Advised pt/family of the availability of  services by request. [facility] to follow while pt is in hospital.     Edwin Thomas  Associate   [facility] Phone Line [phone]  Spiritual Health Pager [phone]    [facility] available 24/7 for emergent requests/referrals, either by paging the on-call  or by entering an ASAP/STAT consult in [facility], which will also page the on-call .    Assessment    Saw pt Lila MANPREET Jenkins per [facility] consult request..    Patient/Family Understanding of Illness and Goals of Care - Lila indicates that she went to the ER in [place] where she lives after experiencing multiple episodes of pain and numbness in her left leg. She was subsequently transferred to [facility] for treatment. She expects to have another surgical procedure tomorrow and is uncertain about where things will go from there.    Distress and Loss - Lila states that July has been a \"miserable\" month for her and that she \"just wish I was home\". She says that she has felt more anxious since her daughter returned home to [facility] over the weekend.    Strengths, Coping, and Resources - Lila states that she does not have a serious heart problem and that this has given her some relief. She is supported by her daughter and Islam Jain. She reports that the nicholas in [place] accompanied her on the initial visit to the hospital.    Meaning, Beliefs, and Spirituality - Lila is a practicing Islam and has been in contact with her rivas community through her daughter. She accepted offer of prayer for her recovery and " indicates that she would appreciate a prayer before her procedure tomorrow.

## 2024-07-23 NOTE — PLAN OF CARE
Date & Time: 7/23/24, 1037-9269  Surgery/POD#: POD 1 cystoscopy w/ retrograde pyelogram and L ureteral stent placement; POD 2 aortic embolectomy via B femoral cutdowns with embolectomy of LLE and 4 compartment fasciotomies  Behavior & Aggression: Green  Fall Risk: Yes  Orientation: AO4  ABNL VS/O2: VSS on RA  ABNL Labs: Phosphorus replacement started.BG checks with meals  Pain Management: scheduled tylenol  Bowel/Bladder: Hui w/ milky urine, doing 24 hour urine collection from 1530 7/23/24 to 1530 7/24/24.  flatus+ and BM today  Drains: Hui  Wounds/incision: LLE with gauze and Rooke boot orthotic, skin warm, +2 pedal pulse, able to wiggle toes, numbness in LLE present.  Diet: moderate carbohydrate  Activity Level: Bedrest  Anticipated DC Date: Pending improvement  Significant Information: Argatroban drip infusing at 14 ml/hr, MUST request refills from pharmacy.  PLAN FOR FASCIOTOMY CLOSURE TOMORROW AT 1030, ARGATROBAN CAN BE RUN WITHOUT STOPPING.

## 2024-07-23 NOTE — PLAN OF CARE
Date & Time:7/22/24   Surgery/POD#: POD 1 LLE ischemia fasciotomy,   Behavior & Aggression: green  Fall Risk: Not OOB   Orientation:AO x $  ABNL VS/O2:VSS on RA  ABNL Labs: elevated BGM covered with insulin  Pain Management:denied  Bowel/Bladder: ross with milky urine,   Drains: ross  Wounds/incision LLE gauze on, boot applied to LLE by orthotics  Diet:NPO for surgery this evening (stent placement for kidney stone)  Activity Level: bedrest  Tests/Procedures:   Anticipated  DC Date: pending progress  Significant Information: Argatroban drip infusing at 14 ml/hr

## 2024-07-23 NOTE — CONSULTS
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  Status post thrombectomy .  Hydronephrosis L UPJ stone.  UTI, recurrent.  Status post left ureteral stent .  Reports of flushing episodes.  Adrenal mass.  Normocytic anemia.  DM2.    Question is whether aortic thrombosis is unprovoked (beyond rare) vs provoked by local processes (adrenal mass, UTI / stone / possible pyelo).  Added on thrombophilia labs (factor V, prothrombin), but cannot test for much more while on argatroban.  Checking serum markers and urine 24 hour testing for adrenal lesion function status (benign adenoma, pheo, cushing, aldosterone) and checking CT AP w/ adrenal protocol.  For now, continue argatroban, but anticipate will need coumadin moving forward, minimum 6 months.  Will continue to follow.    Thank you kindly for this consultation.  David Driscoll MD    --------------------    Interval History:  Lila present for evaluation of aortic thrombosis.  Previously well.  Quit smoking 30 years ago.  Negative personal / family history VTE.  No recent travel or immobility or new medications.  Developed acute LLE numbness and pain and loss of function 720 AM.  CT imaging outside ER concerned for aortic thrombus w/ limb ischemia.  Status post thrombectomy w/ vascular surgery .  No prior anticoagulation prior to thrombosis, possibly some claudication symptoms for months.  July busy w/ recurrent UTIs; horrible GI distress from abx course.  DM2 since ; no real worsening of sugars.  Anemic on presentation; hemoglobin 9.  Report some flushing episodes dating back years.    --------------------    Review of Systems:  10 point ROS negative except for that above.    Past Medical / Surgical History:  No past medical history on file.  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    Social History:  Lives independently.  No etoh.  [place].  Quit smoking 30 years prior.    Medications / Allergies:  Reviewed in EMR.    --------------------    Physical Exam:  VS: /51 (BP Location: Right arm)   Pulse 92   Temp 98.6  F (37  C) (Oral)   Resp 16   Ht 1.524 m (5')   Wt 50.2 kg (110 lb 10.7 oz)   SpO2 92%   BMI 21.61 kg/m    GEN: Well appearing.    Labs / Imaging / Path:  Reviewed CBC, CMP.  Reviewed CT chest w/ independent review.

## 2024-07-23 NOTE — PROGRESS NOTES
S: Lila is a 74 yof that presents today at [facility], Room I357, for the evaluation, fit and delivery of a Pressure Reducing Ankle Foot Orthoses (PRAFO).    Dx: Foot surgery, left foot drop    A: The patient is expected to be in bed for an unspecified duration during recovery from surgery. The PRAFOs will reduce the pressure on the patient s heel to prevent a pressure ulcer from developing on the back of the heel. A PRAFO can also reduce the likelihood of a plantarflexion contracture developing or progressing and may help to stretch the Achilles tendon/dorsiflex the patient s foot.    I demonstrated the donning and doffing of the PRAFO. I then answered the patient s PRAFO questions and provided the patient with the written instructions from the .    P: The PRAFO should be worn according to the physician s directions. Please contact the [facility] at [phone] if you have any questions. Thank you, Jermaine STEEN: A PRAFO will reduce the pressure on the patient s heel to reduce the likelihood of a pressure ulcer from developing or increasing on the back of the heel. A PRAFO can also reduce the likelihood of a plantarflexion contracture developing or progressing and may help to stretch the Achilles tendon/dorsiflex the patient s foot.    Note electronically signed by Jermaine Maciel, Board Eligible

## 2024-07-23 NOTE — ANESTHESIA POSTPROCEDURE EVALUATION
Patient: Lila Jenkins    Procedure: Procedure(s):  CYSTOSCOPY WITH RETROGRADE PYELOGRAM AND  LEFT URETERAL STENT PLACEMENT       Anesthesia Type:  General    Note:  Disposition: Inpatient   Postop Pain Control: Uneventful            Sign Out: Well controlled pain   PONV: No   Neuro/Psych: Uneventful            Sign Out: Acceptable/Baseline neuro status   Airway/Respiratory: Uneventful            Sign Out: Acceptable/Baseline resp. status   CV/Hemodynamics: Uneventful            Sign Out: Acceptable CV status; No obvious hypovolemia; No obvious fluid overload   Other NRE:    DID A NON-ROUTINE EVENT OCCUR?            Last vitals:  Vitals Value Taken Time   /58 07/22/24 2145   Temp 37.2  C (98.9  F) 07/22/24 2100   Pulse 94 07/22/24 2156   Resp 27 07/22/24 2156   SpO2 97 % 07/22/24 2156   Vitals shown include unfiled device data.    Electronically Signed By: Shannan Teran MD  July 22, 2024  10:22 PM

## 2024-07-23 NOTE — PROGRESS NOTES
Notified provider about indwelling ross catheter discussed removal or continued need.    Did provider choose to remove indwelling ross catheter? No    Provider's ross indication for keeping indwelling ross catheter: Wound Healing.    Is there an order for indwelling ross catheter? Yes    *If there is a plan to keep ross catheter in place at discharge daily notification with provider is not necessary, but please add a notation in the treatment team sticky note that the patient will be discharging with the catheter.

## 2024-07-23 NOTE — CONSULTS
Care Management Follow Up    Length of Stay (days): 2    Expected Discharge Date: 07/25/2024     Concerns to be Addressed: discharge planning   (Distance to get back home)  Patient plan of care discussed at interdisciplinary rounds: Yes    Anticipated Discharge Disposition: Skilled Nursing Facility     Anticipated Discharge Services:    Anticipated Discharge DME:      Patient/family educated on Medicare website which has current facility and service quality ratings: yes  Education Provided on the Discharge Plan: Yes  Patient/Family in Agreement with the Plan: yes    Referrals Placed by CM/SW:    Private pay costs discussed:  did preliminary call to check on coverage for patient support for [employer] or eliquis. There are income driven forms for patient to complete or review. Patient says she cannot make any decisions about this today. She is without her phone to do any online or telephone follow up.  --Vascular surgery leaning towards coumadin per notes.     Additional Information:   Patient feeling overwhelmed with all that is happening and is so far away from her family. Tearful at times. Writer offered Spiritual Health consult for emotional support and patient would be appreciative of consult. Consult placed.    Care management will follow along.     Claudia Webster RN   [facility]   Phone [phone], [employer] or [phone]

## 2024-07-23 NOTE — ANESTHESIA CARE TRANSFER NOTE
Patient: Lila Jenkins    Procedure: Procedure(s):  CYSTOSCOPY WITH RETROGRADE PYELOGRAM AND  LEFT URETERAL STENT PLACEMENT       Diagnosis: Kidney stone [N20.0]  Diagnosis Additional Information: No value filed.    Anesthesia Type:   General     Note:    Oropharynx: oropharynx clear of all foreign objects and spontaneously breathing  Level of Consciousness: drowsy  Oxygen Supplementation: face mask  Level of Supplemental Oxygen (L/min / FiO2): 6  Independent Airway: airway patency satisfactory and stable  Dentition: dentition unchanged  Vital Signs Stable: post-procedure vital signs reviewed and stable  Report to RN Given: handoff report given  Patient transferred to: PACU  Comments: Pt exhibits spontaneous respirations, follows commands, suctioned, LMA removed, exchanging well, transferred to pacu with O2 @ 6L via mask, all monitors and alarms on, VSS, patent IV, report and transfer of care to RN.    Handoff Report: Identifed the Patient, Identified the Reponsible Provider, Reviewed the pertinent medical history, Discussed the surgical course, Reviewed Intra-OP anesthesia mangement and issues during anesthesia, Set expectations for post-procedure period and Allowed opportunity for questions and acknowledgement of understanding      Vitals:  Vitals Value Taken Time   /53 07/22/24 2101   Temp     Pulse 94 07/22/24 2115   Resp 17 07/22/24 2115   SpO2 94 % 07/22/24 2115   Vitals shown include unfiled device data.    Electronically Signed By: SIMI Stauffer CRNA  July 22, 2024  9:16 PM

## 2024-07-23 NOTE — PROGRESS NOTES
VASCULAR SURGERY    Surgery scheduled for 1030 hrs. tomorrow morning to close fasciotomy sites.  Will be performed under general anesthesia.  N.p.o. after midnight.         Ken Corrales MD

## 2024-07-24 ENCOUNTER — ANESTHESIA (OUTPATIENT)
Dept: SURGERY | Facility: CLINIC | Age: 74
DRG: 271 | End: 2024-07-24
Payer: MEDICARE

## 2024-07-24 ENCOUNTER — APPOINTMENT (OUTPATIENT)
Dept: SURGERY | Facility: PHYSICIAN GROUP | Age: 74
End: 2024-07-24
Payer: MEDICARE

## 2024-07-24 ENCOUNTER — ANESTHESIA EVENT (OUTPATIENT)
Dept: SURGERY | Facility: CLINIC | Age: 74
DRG: 271 | End: 2024-07-24
Payer: MEDICARE

## 2024-07-24 LAB
ANION GAP SERPL CALCULATED.3IONS-SCNC: 6 MMOL/L (ref 7–15)
APTT PPP: 52 SECONDS (ref 22–38)
APTT PPP: 86 SECONDS (ref 22–38)
APTT PPP: 90 SECONDS (ref 22–38)
APTT PPP: 90 SECONDS (ref 22–38)
BUN SERPL-MCNC: 8 MG/DL (ref 8–23)
CALCIUM SERPL-MCNC: 8 MG/DL (ref 8.8–10.4)
CHLORIDE SERPL-SCNC: 105 MMOL/L (ref 98–107)
CREAT SERPL-MCNC: 0.75 MG/DL (ref 0.51–0.95)
EGFRCR SERPLBLD CKD-EPI 2021: 84 ML/MIN/1.73M2
ERYTHROCYTE [DISTWIDTH] IN BLOOD BY AUTOMATED COUNT: 13.6 % (ref 10–15)
GLUCOSE BLDC GLUCOMTR-MCNC: 239 MG/DL (ref 70–99)
GLUCOSE BLDC GLUCOMTR-MCNC: 244 MG/DL (ref 70–99)
GLUCOSE BLDC GLUCOMTR-MCNC: 266 MG/DL (ref 70–99)
GLUCOSE BLDC GLUCOMTR-MCNC: 267 MG/DL (ref 70–99)
GLUCOSE BLDC GLUCOMTR-MCNC: 86 MG/DL (ref 70–99)
GLUCOSE SERPL-MCNC: 135 MG/DL (ref 70–99)
HCO3 SERPL-SCNC: 27 MMOL/L (ref 22–29)
HCT VFR BLD AUTO: 22.5 % (ref 35–47)
HGB BLD-MCNC: 7.4 G/DL (ref 11.7–15.7)
MAGNESIUM SERPL-MCNC: 2.1 MG/DL (ref 1.7–2.3)
MCH RBC QN AUTO: 32.7 PG (ref 26.5–33)
MCHC RBC AUTO-ENTMCNC: 32.9 G/DL (ref 31.5–36.5)
MCV RBC AUTO: 100 FL (ref 78–100)
PATH REPORT.COMMENTS IMP SPEC: NORMAL
PATH REPORT.COMMENTS IMP SPEC: NORMAL
PATH REPORT.FINAL DX SPEC: NORMAL
PATH REPORT.GROSS SPEC: NORMAL
PATH REPORT.MICROSCOPIC SPEC OTHER STN: NORMAL
PATH REPORT.RELEVANT HX SPEC: NORMAL
PHOSPHATE SERPL-MCNC: 2.6 MG/DL (ref 2.5–4.5)
PHOTO IMAGE: NORMAL
PLATELET # BLD AUTO: 645 10E3/UL (ref 150–450)
POTASSIUM SERPL-SCNC: 5.1 MMOL/L (ref 3.4–5.3)
RBC # BLD AUTO: 2.26 10E6/UL (ref 3.8–5.2)
SODIUM SERPL-SCNC: 138 MMOL/L (ref 135–145)
WBC # BLD AUTO: 8.1 10E3/UL (ref 4–11)

## 2024-07-24 PROCEDURE — 250N000011 HC RX IP 250 OP 636: Performed by: ANESTHESIOLOGY

## 2024-07-24 PROCEDURE — 82384 ASSAY THREE CATECHOLAMINES: CPT | Performed by: INTERNAL MEDICINE

## 2024-07-24 PROCEDURE — 99232 SBSQ HOSP IP/OBS MODERATE 35: CPT | Performed by: STUDENT IN AN ORGANIZED HEALTH CARE EDUCATION/TRAINING PROGRAM

## 2024-07-24 PROCEDURE — 250N000009 HC RX 250: Performed by: SURGERY

## 2024-07-24 PROCEDURE — 36415 COLL VENOUS BLD VENIPUNCTURE: CPT | Performed by: INTERNAL MEDICINE

## 2024-07-24 PROCEDURE — 80048 BASIC METABOLIC PNL TOTAL CA: CPT | Performed by: STUDENT IN AN ORGANIZED HEALTH CARE EDUCATION/TRAINING PROGRAM

## 2024-07-24 PROCEDURE — 250N000011 HC RX IP 250 OP 636: Performed by: SURGERY

## 2024-07-24 PROCEDURE — 120N000001 HC R&B MED SURG/OB

## 2024-07-24 PROCEDURE — 13160 SEC CLSR SURG WND/DEHSN XTN: CPT | Mod: 58 | Performed by: SURGERY

## 2024-07-24 PROCEDURE — 36415 COLL VENOUS BLD VENIPUNCTURE: CPT | Performed by: SURGERY

## 2024-07-24 PROCEDURE — 83835 ASSAY OF METANEPHRINES: CPT | Performed by: INTERNAL MEDICINE

## 2024-07-24 PROCEDURE — 99100 ANES PT EXTEME AGE<1 YR&>70: CPT | Performed by: NURSE ANESTHETIST, CERTIFIED REGISTERED

## 2024-07-24 PROCEDURE — 370N000017 HC ANESTHESIA TECHNICAL FEE, PER MIN: Performed by: SURGERY

## 2024-07-24 PROCEDURE — 85730 THROMBOPLASTIN TIME PARTIAL: CPT | Performed by: PHYSICIAN ASSISTANT

## 2024-07-24 PROCEDURE — 250N000013 HC RX MED GY IP 250 OP 250 PS 637: Performed by: STUDENT IN AN ORGANIZED HEALTH CARE EDUCATION/TRAINING PROGRAM

## 2024-07-24 PROCEDURE — 250N000013 HC RX MED GY IP 250 OP 250 PS 637

## 2024-07-24 PROCEDURE — 250N000011 HC RX IP 250 OP 636: Performed by: NURSE ANESTHETIST, CERTIFIED REGISTERED

## 2024-07-24 PROCEDURE — 84100 ASSAY OF PHOSPHORUS: CPT | Performed by: STUDENT IN AN ORGANIZED HEALTH CARE EDUCATION/TRAINING PROGRAM

## 2024-07-24 PROCEDURE — 82088 ASSAY OF ALDOSTERONE: CPT | Performed by: INTERNAL MEDICINE

## 2024-07-24 PROCEDURE — 710N000009 HC RECOVERY PHASE 1, LEVEL 1, PER MIN: Performed by: SURGERY

## 2024-07-24 PROCEDURE — 250N000025 HC SEVOFLURANE, PER MIN: Performed by: SURGERY

## 2024-07-24 PROCEDURE — 83735 ASSAY OF MAGNESIUM: CPT | Performed by: STUDENT IN AN ORGANIZED HEALTH CARE EDUCATION/TRAINING PROGRAM

## 2024-07-24 PROCEDURE — 84244 ASSAY OF RENIN: CPT | Performed by: INTERNAL MEDICINE

## 2024-07-24 PROCEDURE — 250N000009 HC RX 250: Performed by: NURSE ANESTHETIST, CERTIFIED REGISTERED

## 2024-07-24 PROCEDURE — 82024 ASSAY OF ACTH: CPT | Performed by: INTERNAL MEDICINE

## 2024-07-24 PROCEDURE — 0KNT0ZZ RELEASE LEFT LOWER LEG MUSCLE, OPEN APPROACH: ICD-10-PCS | Performed by: SURGERY

## 2024-07-24 PROCEDURE — 258N000003 HC RX IP 258 OP 636: Performed by: NURSE ANESTHETIST, CERTIFIED REGISTERED

## 2024-07-24 PROCEDURE — 360N000075 HC SURGERY LEVEL 2, PER MIN: Performed by: SURGERY

## 2024-07-24 PROCEDURE — 258N000001 HC RX 258: Performed by: SURGERY

## 2024-07-24 PROCEDURE — 258N000003 HC RX IP 258 OP 636: Performed by: ANESTHESIOLOGY

## 2024-07-24 PROCEDURE — 999N000141 HC STATISTIC PRE-PROCEDURE NURSING ASSESSMENT: Performed by: SURGERY

## 2024-07-24 PROCEDURE — 88311 DECALCIFY TISSUE: CPT | Mod: 26 | Performed by: PATHOLOGY

## 2024-07-24 PROCEDURE — 85730 THROMBOPLASTIN TIME PARTIAL: CPT | Performed by: SURGERY

## 2024-07-24 PROCEDURE — 88304 TISSUE EXAM BY PATHOLOGIST: CPT | Mod: 26 | Performed by: PATHOLOGY

## 2024-07-24 PROCEDURE — 272N000001 HC OR GENERAL SUPPLY STERILE: Performed by: SURGERY

## 2024-07-24 PROCEDURE — 250N000011 HC RX IP 250 OP 636

## 2024-07-24 PROCEDURE — 250N000012 HC RX MED GY IP 250 OP 636 PS 637: Performed by: STUDENT IN AN ORGANIZED HEALTH CARE EDUCATION/TRAINING PROGRAM

## 2024-07-24 PROCEDURE — 85027 COMPLETE CBC AUTOMATED: CPT

## 2024-07-24 PROCEDURE — 13160 SEC CLSR SURG WND/DEHSN XTN: CPT | Performed by: NURSE ANESTHETIST, CERTIFIED REGISTERED

## 2024-07-24 PROCEDURE — 13160 SEC CLSR SURG WND/DEHSN XTN: CPT | Performed by: ANESTHESIOLOGY

## 2024-07-24 RX ORDER — FENTANYL CITRATE 0.05 MG/ML
25 INJECTION, SOLUTION INTRAMUSCULAR; INTRAVENOUS EVERY 5 MIN PRN
Status: DISCONTINUED | OUTPATIENT
Start: 2024-07-24 | End: 2024-07-24 | Stop reason: HOSPADM

## 2024-07-24 RX ORDER — LIDOCAINE HYDROCHLORIDE 20 MG/ML
INJECTION, SOLUTION INFILTRATION; PERINEURAL PRN
Status: DISCONTINUED | OUTPATIENT
Start: 2024-07-24 | End: 2024-07-24

## 2024-07-24 RX ORDER — CEFAZOLIN SODIUM 1 G/3ML
1 INJECTION, POWDER, FOR SOLUTION INTRAMUSCULAR; INTRAVENOUS EVERY 8 HOURS
Status: DISCONTINUED | OUTPATIENT
Start: 2024-07-24 | End: 2024-07-24

## 2024-07-24 RX ORDER — ACETAMINOPHEN 325 MG/1
975 TABLET ORAL ONCE
Status: DISCONTINUED | OUTPATIENT
Start: 2024-07-24 | End: 2024-07-24 | Stop reason: HOSPADM

## 2024-07-24 RX ORDER — MAGNESIUM HYDROXIDE 1200 MG/15ML
LIQUID ORAL PRN
Status: DISCONTINUED | OUTPATIENT
Start: 2024-07-24 | End: 2024-07-24 | Stop reason: HOSPADM

## 2024-07-24 RX ORDER — CEFAZOLIN SODIUM/WATER 2 G/20 ML
2 SYRINGE (ML) INTRAVENOUS
Status: DISCONTINUED | OUTPATIENT
Start: 2024-07-24 | End: 2024-07-24 | Stop reason: HOSPADM

## 2024-07-24 RX ORDER — HYDROMORPHONE HCL IN WATER/PF 6 MG/30 ML
0.4 PATIENT CONTROLLED ANALGESIA SYRINGE INTRAVENOUS EVERY 5 MIN PRN
Status: DISCONTINUED | OUTPATIENT
Start: 2024-07-24 | End: 2024-07-24 | Stop reason: HOSPADM

## 2024-07-24 RX ORDER — GABAPENTIN 100 MG/1
100 CAPSULE ORAL 3 TIMES DAILY
Status: DISCONTINUED | OUTPATIENT
Start: 2024-07-24 | End: 2024-07-30 | Stop reason: HOSPADM

## 2024-07-24 RX ORDER — ONDANSETRON 2 MG/ML
INJECTION INTRAMUSCULAR; INTRAVENOUS PRN
Status: DISCONTINUED | OUTPATIENT
Start: 2024-07-24 | End: 2024-07-24

## 2024-07-24 RX ORDER — CEFAZOLIN SODIUM/WATER 2 G/20 ML
2 SYRINGE (ML) INTRAVENOUS SEE ADMIN INSTRUCTIONS
Status: DISCONTINUED | OUTPATIENT
Start: 2024-07-24 | End: 2024-07-24 | Stop reason: HOSPADM

## 2024-07-24 RX ORDER — HYDRALAZINE HYDROCHLORIDE 20 MG/ML
2.5-5 INJECTION INTRAMUSCULAR; INTRAVENOUS EVERY 10 MIN PRN
Status: DISCONTINUED | OUTPATIENT
Start: 2024-07-24 | End: 2024-07-24 | Stop reason: HOSPADM

## 2024-07-24 RX ORDER — SODIUM CHLORIDE, SODIUM LACTATE, POTASSIUM CHLORIDE, CALCIUM CHLORIDE 600; 310; 30; 20 MG/100ML; MG/100ML; MG/100ML; MG/100ML
INJECTION, SOLUTION INTRAVENOUS CONTINUOUS
Status: DISCONTINUED | OUTPATIENT
Start: 2024-07-24 | End: 2024-07-24 | Stop reason: HOSPADM

## 2024-07-24 RX ORDER — PROPOFOL 10 MG/ML
INJECTION, EMULSION INTRAVENOUS PRN
Status: DISCONTINUED | OUTPATIENT
Start: 2024-07-24 | End: 2024-07-24

## 2024-07-24 RX ORDER — HYDROMORPHONE HCL IN WATER/PF 6 MG/30 ML
0.2 PATIENT CONTROLLED ANALGESIA SYRINGE INTRAVENOUS EVERY 5 MIN PRN
Status: DISCONTINUED | OUTPATIENT
Start: 2024-07-24 | End: 2024-07-24 | Stop reason: HOSPADM

## 2024-07-24 RX ORDER — DEXAMETHASONE SODIUM PHOSPHATE 4 MG/ML
4 INJECTION, SOLUTION INTRA-ARTICULAR; INTRALESIONAL; INTRAMUSCULAR; INTRAVENOUS; SOFT TISSUE
Status: DISCONTINUED | OUTPATIENT
Start: 2024-07-24 | End: 2024-07-24 | Stop reason: HOSPADM

## 2024-07-24 RX ORDER — FENTANYL CITRATE 50 UG/ML
INJECTION, SOLUTION INTRAMUSCULAR; INTRAVENOUS PRN
Status: DISCONTINUED | OUTPATIENT
Start: 2024-07-24 | End: 2024-07-24

## 2024-07-24 RX ORDER — LIDOCAINE 40 MG/G
CREAM TOPICAL
Status: DISCONTINUED | OUTPATIENT
Start: 2024-07-24 | End: 2024-07-30 | Stop reason: HOSPADM

## 2024-07-24 RX ORDER — ONDANSETRON 2 MG/ML
4 INJECTION INTRAMUSCULAR; INTRAVENOUS EVERY 30 MIN PRN
Status: DISCONTINUED | OUTPATIENT
Start: 2024-07-24 | End: 2024-07-24 | Stop reason: HOSPADM

## 2024-07-24 RX ORDER — NALOXONE HYDROCHLORIDE 0.4 MG/ML
0.1 INJECTION, SOLUTION INTRAMUSCULAR; INTRAVENOUS; SUBCUTANEOUS
Status: DISCONTINUED | OUTPATIENT
Start: 2024-07-24 | End: 2024-07-24 | Stop reason: HOSPADM

## 2024-07-24 RX ORDER — FENTANYL CITRATE 0.05 MG/ML
50 INJECTION, SOLUTION INTRAMUSCULAR; INTRAVENOUS EVERY 5 MIN PRN
Status: DISCONTINUED | OUTPATIENT
Start: 2024-07-24 | End: 2024-07-24 | Stop reason: HOSPADM

## 2024-07-24 RX ORDER — ONDANSETRON 4 MG/1
4 TABLET, ORALLY DISINTEGRATING ORAL EVERY 30 MIN PRN
Status: DISCONTINUED | OUTPATIENT
Start: 2024-07-24 | End: 2024-07-24 | Stop reason: HOSPADM

## 2024-07-24 RX ORDER — HYDROMORPHONE HCL IN WATER/PF 6 MG/30 ML
PATIENT CONTROLLED ANALGESIA SYRINGE INTRAVENOUS
Status: COMPLETED
Start: 2024-07-24 | End: 2024-07-24

## 2024-07-24 RX ORDER — BUPIVACAINE HYDROCHLORIDE 5 MG/ML
INJECTION, SOLUTION PERINEURAL PRN
Status: DISCONTINUED | OUTPATIENT
Start: 2024-07-24 | End: 2024-07-24 | Stop reason: HOSPADM

## 2024-07-24 RX ORDER — LABETALOL HYDROCHLORIDE 5 MG/ML
10 INJECTION, SOLUTION INTRAVENOUS
Status: DISCONTINUED | OUTPATIENT
Start: 2024-07-24 | End: 2024-07-24 | Stop reason: HOSPADM

## 2024-07-24 RX ADMIN — ACETAMINOPHEN 975 MG: 325 TABLET, FILM COATED ORAL at 14:01

## 2024-07-24 RX ADMIN — FENTANYL CITRATE 50 MCG: 50 INJECTION, SOLUTION INTRAMUSCULAR; INTRAVENOUS at 11:50

## 2024-07-24 RX ADMIN — PROPOFOL 20 MG: 10 INJECTION, EMULSION INTRAVENOUS at 11:11

## 2024-07-24 RX ADMIN — PROPOFOL 130 MG: 10 INJECTION, EMULSION INTRAVENOUS at 10:43

## 2024-07-24 RX ADMIN — FERROUS SULFATE TAB 325 MG (65 MG ELEMENTAL FE) 325 MG: 325 (65 FE) TAB at 14:01

## 2024-07-24 RX ADMIN — INSULIN DETEMIR 12 UNITS: 100 INJECTION, SOLUTION SUBCUTANEOUS at 00:58

## 2024-07-24 RX ADMIN — OXYCODONE HYDROCHLORIDE 5 MG: 5 TABLET ORAL at 17:45

## 2024-07-24 RX ADMIN — ARGATROBAN 4.5 MCG/KG/MIN: 50 INJECTION, SOLUTION INTRAVENOUS at 17:35

## 2024-07-24 RX ADMIN — PHENYLEPHRINE HYDROCHLORIDE 0.6 MCG/KG/MIN: 10 INJECTION INTRAVENOUS at 10:48

## 2024-07-24 RX ADMIN — ARGATROBAN 4.5 MCG/KG/MIN: 50 INJECTION, SOLUTION INTRAVENOUS at 12:28

## 2024-07-24 RX ADMIN — ARGATROBAN 5 MCG/KG/MIN: 50 INJECTION, SOLUTION INTRAVENOUS at 00:54

## 2024-07-24 RX ADMIN — ASPIRIN 81 MG: 81 TABLET, COATED ORAL at 14:01

## 2024-07-24 RX ADMIN — GABAPENTIN 100 MG: 100 CAPSULE ORAL at 21:08

## 2024-07-24 RX ADMIN — HYDROMORPHONE HYDROCHLORIDE 0.2 MG: 0.2 INJECTION, SOLUTION INTRAMUSCULAR; INTRAVENOUS; SUBCUTANEOUS at 09:12

## 2024-07-24 RX ADMIN — ARGATROBAN 4.5 MCG/KG/MIN: 50 INJECTION, SOLUTION INTRAVENOUS at 21:34

## 2024-07-24 RX ADMIN — INSULIN DETEMIR 12 UNITS: 100 INJECTION, SOLUTION SUBCUTANEOUS at 21:24

## 2024-07-24 RX ADMIN — CEFTRIAXONE SODIUM 1 G: 1 INJECTION, POWDER, FOR SOLUTION INTRAMUSCULAR; INTRAVENOUS at 18:31

## 2024-07-24 RX ADMIN — ONDANSETRON 4 MG: 2 INJECTION INTRAMUSCULAR; INTRAVENOUS at 10:55

## 2024-07-24 RX ADMIN — ARGATROBAN 5 MCG/KG/MIN: 50 INJECTION, SOLUTION INTRAVENOUS at 04:43

## 2024-07-24 RX ADMIN — SODIUM CHLORIDE, POTASSIUM CHLORIDE, SODIUM LACTATE AND CALCIUM CHLORIDE: 600; 310; 30; 20 INJECTION, SOLUTION INTRAVENOUS at 09:58

## 2024-07-24 RX ADMIN — OXYCODONE HYDROCHLORIDE 5 MG: 5 TABLET ORAL at 17:47

## 2024-07-24 RX ADMIN — FENTANYL CITRATE 50 MCG: 50 INJECTION INTRAMUSCULAR; INTRAVENOUS at 10:52

## 2024-07-24 RX ADMIN — FENTANYL CITRATE 50 MCG: 50 INJECTION INTRAMUSCULAR; INTRAVENOUS at 10:41

## 2024-07-24 RX ADMIN — LIDOCAINE HYDROCHLORIDE 100 MG: 20 INJECTION, SOLUTION INFILTRATION; PERINEURAL at 10:43

## 2024-07-24 RX ADMIN — POTASSIUM & SODIUM PHOSPHATES POWDER PACK 280-160-250 MG 1 PACKET: 280-160-250 PACK at 02:11

## 2024-07-24 RX ADMIN — FENTANYL CITRATE 50 MCG: 50 INJECTION, SOLUTION INTRAMUSCULAR; INTRAVENOUS at 12:27

## 2024-07-24 RX ADMIN — GABAPENTIN 100 MG: 100 CAPSULE ORAL at 14:01

## 2024-07-24 ASSESSMENT — ACTIVITIES OF DAILY LIVING (ADL)
ADLS_ACUITY_SCORE: 43
ADLS_ACUITY_SCORE: 44
ADLS_ACUITY_SCORE: 43
ADLS_ACUITY_SCORE: 44
ADLS_ACUITY_SCORE: 43
ADLS_ACUITY_SCORE: 44
ADLS_ACUITY_SCORE: 43
ADLS_ACUITY_SCORE: 44
ADLS_ACUITY_SCORE: 43
ADLS_ACUITY_SCORE: 44
ADLS_ACUITY_SCORE: 43
ADLS_ACUITY_SCORE: 44
ADLS_ACUITY_SCORE: 44
ADLS_ACUITY_SCORE: 43

## 2024-07-24 ASSESSMENT — LIFESTYLE VARIABLES: TOBACCO_USE: 1

## 2024-07-24 NOTE — PLAN OF CARE
Date & Time: 7/24/24, 7565-8353    Surgery/POD#: POD 2 cystoscopy w/ retrograde pyelogram and L ureteral stent placement; POD 3 aortic embolectomy via B femoral cutdowns with embolectomy of LLE and 4 compartment fasciotomies. POD 0 LLE I&D, closure and fasciotomy  Behavior & Aggression: Green  Fall Risk: Yes  Orientation: A&O4  ABNL VS/O2: VSS on RA  ABNL Labs: See chart, on all protocols. WNL  Pain Management: Scheduled tylenol  Bowel/Bladder: Hui, 24 hour urine collection completed.   Drains: Hui, bag changed due to leaking at drainage site. No BM during shift. Patient reporting previous loose incontinent BM  Wounds/incision: LLE with gauze and Rooke boot orthotic, skin warm, +2 pedal pulse, able to wiggle toes, numbness in LLE present. Pain much more controlled since surgery.   Diet: Mod carb  Activity Level:  Not oob  Anticipated DC Date: Pending improvement  Significant Information: Argatroban drip infusing at 4.5 ml/hr, MUST request refills from pharmacy.

## 2024-07-24 NOTE — ANESTHESIA POSTPROCEDURE EVALUATION
Patient: Lila Jenkins    Procedure: Procedure(s):  FASCIOTOMY, LOWER EXTREMITY IRRIGATION AND DEBRIDEMENT AND CLOSURE       Anesthesia Type:  General    Note:     Postop Pain Control: Uneventful            Sign Out: Well controlled pain   PONV: No   Neuro/Psych: Uneventful            Sign Out: Acceptable/Baseline neuro status   Airway/Respiratory: Uneventful            Sign Out: Acceptable/Baseline resp. status   CV/Hemodynamics: Uneventful            Sign Out: Acceptable CV status   Other NRE: NONE   DID A NON-ROUTINE EVENT OCCUR?            Last vitals:  Vitals Value Taken Time   /68 07/24/24 1245   Temp 36.6  C (97.8  F) 07/24/24 1130   Pulse 83 07/24/24 1256   Resp 21 07/24/24 1256   SpO2 96 % 07/24/24 1256   Vitals shown include unfiled device data.    Electronically Signed By: Tyler Mejia MD  July 24, 2024  12:57 PM

## 2024-07-24 NOTE — ANESTHESIA PREPROCEDURE EVALUATION
Anesthesia Pre-Procedure Evaluation    Patient: Lila Jenkins   MRN: [id number] : 1950        Procedure : Procedure(s):  FASCIOTOMY, LOWER EXTREMITY          No past medical history on file.   Past Surgical History:   Procedure Laterality Date    ANGIOGRAM  2024    Procedure: Angiogram LEFT LEG;  Surgeon: Brandie Ochoa MD;  Location:  OR    COMBINED CYSTOSCOPY, INSERT STENT URETER(S) Left 2024    Procedure: CYSTOSCOPY WITH RETROGRADE PYELOGRAM AND  LEFT URETERAL STENT PLACEMENT;  Surgeon: Mohit Byrd MD;  Location: [facility] OR    EMBOLECTOMY LOWER EXTREMITY Bilateral 2024    Procedure: Bilateral Femoral Cut Down Aortic Amerron Embolectomy Left Lower Extremity,  Left Leg Fasciotomies LEFT ANTERIOR TIBIAL EMBOLECTOMY AND REPAIR;  Surgeon: Brandie Ochoa MD;  Location:  OR    IR OR ANGIOGRAM  2024      Allergies   Allergen Reactions    Pentosan Polysulfate       Social History     Tobacco Use    Smoking status: Not on file    Smokeless tobacco: Not on file   Substance Use Topics    Alcohol use: Not on file      Wt Readings from Last 1 Encounters:   24 52.8 kg (116 lb 6.5 oz)        Anesthesia Evaluation   Pt has had prior anesthetic.     No history of anesthetic complications       ROS/MED HX  ENT/Pulmonary:     (+)                tobacco use, Past use,  25  Pack-Year Hx,                      Neurologic:       Cardiovascular:     (+) Dyslipidemia hypertension- Peripheral Vascular Disease-   -  - -                                 Previous cardiac testing   Echo: Date: 24 Results:  Interpretation Summary  The visual ejection fraction is estimated at 75%. Hyperdynamic left ventricular function An intracavitary gradient is present. With Valsalva the gradient increases to  28 mmHg which is mild. No evidence of systolic anterior motion of the mitral valve leaflet. Trivial pericardial effusion The study was technically difficult.      Left Ventricle  The left ventricle is  normal in size. There is normal left ventricular wall thickness. Diastolic Doppler findings (E/E' ratio and/or other parameters) suggest left ventricular filling pressures are increased. Grade I or early diastolic dysfunction. An intracavitary gradient is present. With Valsalva the gradient increases to 28 mmHg which is mild. No evidence of systolic anterior motion of the mitral valve leaflet. Hyperdynamic left ventricular function. The visual ejection fraction is estimated at 75%.     Right Ventricle  The right ventricle is normal in size and function.     Atria  Normal left atrial size. Right atrial size is normal. There is no color Doppler evidence of an atrial shunt. Lipomatous hypertrophy of the interatrial septum is noted.     Mitral Valve  The mitral valve leaflets are moderately thickened. There is moderate mitral annular calcification. There is trace mitral regurgitation. The mean mitral valve gradient is 4.7 mmHg.     Tricuspid Valve  There is trace tricuspid regurgitation. Right ventricular systolic pressure could not be approximated due to inadequate tricuspid regurgitation.     Aortic Valve  The aortic valve is not well visualized. No aortic regurgitation is present.  No hemodynamically significant valvular aortic stenosis.     Pulmonic Valve  There is no pulmonic valvular regurgitation. Normal pulmonic valve velocity.     Vessels  The aortic root is normal size. Normal size ascending aorta. IVC diameter <2.1 cm collapsing >50% with sniff suggests a normal RA pressure of 3 mmHg.     Pericardium  Trivial pericardial effusion.     Rhythm  Sinus rhythm was noted  Stress Test:  Date: Results:    ECG Reviewed:  Date: Results:    Cath:  Date: Results:      METS/Exercise Tolerance:     Hematologic:       Musculoskeletal:       GI/Hepatic:       Renal/Genitourinary:     (+)       Nephrolithiasis ,       Endo:     (+)  type II DM,                    Psychiatric/Substance Use:       Infectious Disease:      "  Malignancy:       Other:            Physical Exam    Airway        Mallampati: II   TM distance: > 3 FB   Neck ROM: full   Mouth opening: > 3 cm    Respiratory Devices and Support         Dental       (+) Edentulous      Cardiovascular          Rhythm and rate: regular and normal     Pulmonary           breath sounds clear to auscultation           OUTSIDE LABS:  CBC:   Lab Results   Component Value Date    WBC 8.1 07/24/2024    WBC 11.2 (H) 07/23/2024    HGB 7.4 (L) 07/24/2024    HGB 7.0 (L) 07/23/2024    HCT 22.5 (L) 07/24/2024    HCT 21.2 (L) 07/23/2024     (H) 07/24/2024     (H) 07/23/2024     BMP:   Lab Results   Component Value Date     07/24/2024     07/23/2024    POTASSIUM 5.1 07/24/2024    POTASSIUM 4.0 07/23/2024    POTASSIUM 4.0 07/23/2024    CHLORIDE 105 07/24/2024    CHLORIDE 104 07/23/2024    CO2 27 07/24/2024    CO2 22 07/23/2024    BUN 8.0 07/24/2024    BUN 8.2 07/23/2024    CR 0.75 07/24/2024    CR 0.72 07/23/2024     (H) 07/24/2024     (H) 07/24/2024     COAGS:   Lab Results   Component Value Date    PTT 90 (H) 07/24/2024    INR 3.93 (H) 07/22/2024    FIBR 380 07/21/2024     POC: No results found for: \"BGM\", \"HCG\", \"HCGS\"  HEPATIC:   Lab Results   Component Value Date    ALBUMIN 2.9 (L) 07/21/2024    PROTTOTAL 5.8 (L) 07/21/2024    ALT 16 07/21/2024    AST 24 07/21/2024    ALKPHOS 120 07/21/2024    BILITOTAL 0.2 07/21/2024     OTHER:   Lab Results   Component Value Date    LACT 1.1 07/21/2024    A1C 10.9 (H) 07/21/2024    ARTURO 8.0 (L) 07/24/2024    PHOS 2.6 07/24/2024    MAG 2.1 07/24/2024       Anesthesia Plan    ASA Status:  3    NPO Status:  NPO Appropriate    Anesthesia Type: General.     - Airway: LMA   Induction: Intravenous.   Maintenance: Balanced.        Consents    Anesthesia Plan(s) and associated risks, benefits, and realistic alternatives discussed. Questions answered and patient/representative(s) expressed understanding.     - Discussed:     - " Discussed with:  Patient            Postoperative Care    Pain management: IV analgesics, Oral pain medications, Multi-modal analgesia.   PONV prophylaxis: Ondansetron (or other 5HT-3)     Comments:               Tyler Mejia MD    I have reviewed the pertinent notes and labs in the chart from the past 30 days and (re)examined the patient.  Any updates or changes from those notes are reflected in this note.

## 2024-07-24 NOTE — PROGRESS NOTES
[facility]    Medicine Progress Note - Hospitalist Service    Date of Admission:  7/21/2024    Assessment & Plan   Lila Jenkins is a 74 year old female with past medical history significant for type II DM, HTN, recurrent UTI, nephrolithiasis admitted on 7/21/2024 with acute limb ischemia.      Pt presented to an outside hospital on 7/21 with acute onset left lower extremity numbness and pain. 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 LLE with occlusion noted in below knee popliteal artery with scattered runoff seen to the LLE, no inline flow to the foot. She has bety 2B acute limb ischemia resulting from acute aortic occlusion with distal embolization to LLE tibial vessels. She was transferred to [facility] for vascular surgery consultation. Pt was taken emergently for Aortic embolectomy, left anterior tibial embolectomy, bilateral iliac artery embolization and 4 compartment fasciotomies of the LLE. The procedure was well tolerated. The patient was extubated and stable upon arrival to the ICU. The hospitalist service was consulted for medical co-management.      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 with Bety 2B ischemia   S/p Aortic embolectomy, left anterior tibial embolectomy, bilateral iliac artery embolization and 4 compartment fasciotomies of the LLE (7/21/24)   S/p primary closure of anterior lateral and posterior fasciotomy sites 7/24/24  - Post-operative cares, dressing changes, anticoagulation, pain control, activity restrictions per vascular surgery    - Pt currently anticoagulated with argatroban due to allergy to heparin component, continue per vascular   - CTA chest and Echo ordered per vascular surgery for hypercoag work-up, consider heme/onc consultation  -  "per vascular: \"Pt with foot drop and ongoing neuro deficits, not surprising given >24 hours ischemia time, these will take time to recover and may not recover at all.\"  - Orthotics consult for foot drop boot   - consult heme onc for arterial clot  - will need a minimum of 6 months of anticoagulation. Plan coumadin.      Type II DM   Hyperglycemia  Very poorly controlled. Hemoglobin A1C is 10.9% on admission. Blood sugars in the high-200-500 range currently.   PTA regimen includes Detemir 12 units at bedtime, Metformin 750mg BID, and Berberine.   - Resume Detemir, 12 units   - MDSSI   - aspart 1u/15CHO  - Hypoglycemia protocol   - Hold PTA metformin and berberine      Acute Blood Loss Anemia   Reactive Thrombocytosis   Hemoglobin on admission was around 9. Dropped to 7.2 post-operatively   - Conditional PRBCs ordered for Hgb <7   - Iron tabs started per vascular   - Monitor hemoglobin Q12H      Supratherapeutic INR   Pt was given argatroban and bivalirudin at the OSH which can elevate INR. She is not on any anticoagulation PTA   - Re-check INR.      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 Additional left renal calyceal calculi. Air in the left renal calices along with perinephric stranding concern for emphysematous pyelonephritis. Non-obstructing R nephrolithiasis.   * Urology consulted and recommending ureteral stent placement today at 7pm given concern for infected urine/pus behind the stone in her left prox ureter.   - Recently on antibiotics, continue with Ceftriaxone for now   - Follow urine cultures   - Appreciate urology recommendations      Prolonged QT  QTc on admission is 499   - Avoid QT prolonging medications   - Telemetry      Leukocytosis   WBC " initially within normal limits at 1.0, but then acutely koffi to 21.2 on admission, now down to 13.3.   - Monitor   - On Ceftriaxone as noted above for possible UTI.      Hypokalemia  Hypomagnesemia  Hypophosphatemia  - Replacement protocols ordered     Supplement Use   Pt takes a number of supplements including Alpha lipoic acid, Ashwaganda, Berberine, Bilberry, Biotin, Cinnamon, Collagen, Echinacea, Ginko Biloba, Culturell, Lecithin, Moringa Oleifera, Multivitamin, Vitamin C, and Vitamin D   - Will hold all supplements while hospitalized   - Pt will need long-term anticoagulation with coumadin, will need to discuss with pharmacy if this could interact with any of these supplements      Adrenal nodule   CT chest showed 4.6cm left adrenal nodule. Review of outside paperwork suggests this has been previously identified and stable from prior   - Continue outpatient evaluation          Diet: Advance Diet as Tolerated: Fully Advanced to diet(s) per Provider order; Regular Diet Adult; Moderate Consistent Carb (60 g CHO per Meal) Diet    DVT Prophylaxis: argatroban  Hui Catheter: PRESENT, indication: Surgical procedure, Surgical procedure  Lines: None     Cardiac Monitoring: None  Code Status: Full Code      Clinically Significant Risk Factors              # Hypoalbuminemia: Lowest albumin = 2.9 g/dL at 7/21/2024  8:05 PM, will monitor as appropriate    # Coagulation Defect: INR = 3.93 (Ref range: 0.85 - 1.15) and/or PTT = 90 Seconds (Ref range: 22 - 38 Seconds), will monitor for bleeding              #Precipitous drop in Hgb/Hct: Lowest Hgb this hospitalization: 7 g/dL. Will continue to monitor and treat/transfuse as appropriate.    # DMII: A1C = 10.9 % (Ref range: <5.7 %) within past 6 months, PRESENT ON ADMISSION        # Financial/Environmental Concerns: none         Disposition Plan     Medically Ready for Discharge: Anticipated in 2-4 Days             Flavio Borden MD  Hospitalist [facility]  [facility]  Securely message with [employer] (more info)  Text page via [employer] Paging/Directory   ______________________________________________________________________    Interval History   Ongoing pain. Seen pre-op. Eager for procedure because that gets her closer to discharge. Glucose has been variable again.     Physical Exam   Vital Signs: Temp: 98.5  F (36.9  C) Temp src: Oral BP: 126/62 Pulse: 86   Resp: 19 SpO2: 94 % O2 Device: None (Room air) Oxygen Delivery: 1 LPM  Weight: 116 lbs 6.45 oz    Constitutional: Awake, alert, cooperative, no apparent distress  Respiratory: Clear to auscultation bilaterally, no crackles or wheezing  Cardiovascular: Regular rate and rhythm, normal S1 and S2, and no murmur noted  GI: Normal bowel sounds, soft, non-distended, non-tender  Skin/Integumen: No rashes, no cyanosis, no edema of exposed skin.   Other:       Medical Decision Making       38 MINUTES SPENT BY ME on the date of service doing chart review, history, exam, documentation & further activities per the note.      Data   ------------------------- PAST 24 HR DATA REVIEWED -----------------------------------------------    I have personally reviewed the following data over the past 24 hrs:    8.1  \   7.4 (L)   / 645 (H)     138 105 8.0 /  86   5.1 27 0.75 \     INR:  N/A PTT:  90 (H)   D-dimer:  N/A Fibrinogen:  N/A       Imaging results reviewed over the past 24 hrs:   Recent Results (from the past 24 hour(s))   CTA Abdomen Pelvis with Contrast    Narrative    EXAM: CTA ABDOMEN PELVIS WITH CONTRAST  LOCATION: [facility]  DATE: 7/23/2024    INDICATION: Evaluate residual aortic thrombus.  COMPARISON: Patient had CT exam of the chest performed July 22, 2024.  TECHNIQUE: CT angiogram abdomen pelvis during arterial phase of injection of IV contrast. 2D and 3D MIP reconstructions were performed by the CT technologist. Dose reduction techniques were used.  CONTRAST: 72 mL Isovue  370    FINDINGS:  Small to mild bilateral pleural effusions with associated atelectasis, comparable to the day prior. Degenerative disc disease in the mid lumbar spine, though no acute osseous abnormality. Heart size is normal. The liver, gallbladder, spleen, right   adrenal gland are unremarkable. The pancreas is unremarkable. There is a vascular mass comprising the left adrenal gland or arising from the left adrenal gland and measuring 4.7 cm AP x 3.4 cm transverse. The right adrenal gland is normally perfused. No   hydronephrosis or nephrolithiasis. Overall, global underperfusion of the left kidney, though with new ureteral stent in position. Renal calculi noted within the renal collecting system and proximal ureter, though without considerable hydronephrosis.   Ureteral stent was placed the day prior. The bladder is decompressed with a Hui catheter. Probable cystic structure in the right adnexa, though poorly visualized and assessed. Free fluid noted within the pelvis. No dilated loops of bowel.    The visible descending thoracic aorta is patent. The celiac axis, SMA, and bilateral renal arteries are patent, though with at least moderate stenosis throughout the proximal left renal artery over a 1.5-2 cm segment. Mild stenosis in the proximal right   renal artery. Circumferential atherosclerotic plaque throughout the infrarenal abdominal aorta with relatively narrow lumen, though no obvious focal stenosis. There are dissections in both common iliac arteries, not particularly flow limiting, though   there is a patent false lumen and certainly consideration could be made for possible embolic source. Both internal iliac and external iliac arteries are patent. Small nonflow limiting dissection of the proximal left external iliac artery. Air in the   subcutaneous tissue overlying both groins, likely related to recent procedure, though limited patient information available. There is intraluminal irregularity in the  right common femoral artery with both hard and soft plaque extending into the lumen of   this arterial segment. Also, small dissection in proximal right external iliac artery.      Impression    IMPRESSION:  1.  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 Also small dissection in the proximal right external iliac artery.  2.  Intraluminal filling defect in the right common femoral artery both hard and soft plaque and relatively narrow, though extends beyond detention through the arterial lumen.  3.  Diffuse atherosclerotic calcification throughout the infrarenal abdominal aorta. No obvious or overt area of ulceration.  4.  Moderate to severe stenosis in the proximal left renal artery.  5.  Vascular left adrenal mass measuring 4.7 x 3.4 cm. Exact etiology is uncertain, though malignancy is certainly a consideration. Would consider MRI or biopsy for most definitive evaluation.  6.  Small to mild bilateral pleural effusions.  7.  Global underperfusion of the left kidney relative to the right may relate to stenosis, though patient had ureteral stent placed for nephrolithiasis with concern for underlying infection, may be cause for appearance.  8.  Possible cystic structure in the right hemipelvis, though difficult to evaluate with CT exam. If concerned, ultrasound would be preferable, though loop of bowel may be causing the appearance.   CT Abdomen wo & w & Pelvis w Contrast    Addendum: 7/23/2024    CLINICAL ADDENDUM:   Clinical information in this report has been modified from the previous version as follows: Impression 7: There are small cystic lesions in the head of the pancreas. Recommend MRCP within 6 months.    END ADDENDUM      Narrative    EXAM: CT ABDOMEN WO and W and PELVIS W CONTRAST  LOCATION: [facility]  DATE: 7/23/2024    INDICATION: adrenal protocol, pt with 4cm adrenal  mass with hypercoaguable state c f poss malignancy  COMPARISON: CT abdomen and pelvis 7/23/2024  TECHNIQUE: CT scan of the abdomen using renal mass protocol with pre contrast, arterial, portal venous, and delayed images. The pelvis was imaged during the portal venous phase. Multiplanar reformats were obtained. Dose reduction techniques were used.  CONTRAST: 72 mL Isovue 370    FINDINGS:    LOWER CHEST: Small bilateral pleural effusions with adjacent atelectasis.    HEPATOBILIARY: Indeterminate hypodense lesion in the right hepatic lobe measuring 1.3 cm.    PANCREAS: There are small cystic lesions in the head of the pancreas.    SPLEEN: Normal.    ADRENAL GLANDS: The right adrenal gland is normal. There is a heterogeneously enhancing left adrenal mass measuring 4.3 cm with washout characteristics indeterminate for adenoma.    RIGHT KIDNEY/URETER: Unremarkable    LEFT KIDNEY/URETER: Left double-J ureteral stent in place. Mild hydronephrosis. A small amount of gas in the renal collecting system. Mild urothelial thickening and hyperenhancement of the renal pelvis and proximal ureter. There are a few stones in the   lower pole of the left kidney measuring up to 12 mm. There are stones in the proximal left ureter measuring up to 11 mm. Heterogeneous enhancement of the left kidney. Delayed left nephrogram with no excretion of contrast at 15 minutes. The bladder is   decompressed with a Hui catheter in place.    BOWEL: No obstruction or inflammatory change.    LYMPH NODES: Normal.    VASCULATURE: Extensive atherosclerotic calcifications of the aorta.    PELVIS: Indeterminate right adnexal mass measuring 5.5 cm.    MUSCULOSKELETAL: Diffuse body wall edema.      Impression    IMPRESSION:  1.  There is a heterogeneously enhancing left adrenal mass measuring 4.3 cm. Washout characteristics are indeterminate for adrenal adenoma. Given size greater than 4 cm, surgical consultation recommended.    2.  A left double-J ureteral  stent is in place. There is mild left hydronephrosis. There is a small amount of gas in the left renal collecting system which may be due to instrumentation or infection. There is heterogeneous enhancement of the left kidney   suspicious for pyelonephritis. No excretion of contrast from the left kidney at 15 minutes.    3.  There are several large left renal and proximal ureteral calculi.    4.  Indeterminate right adnexal mass measuring 5.5 cm. Recommend further evaluation with pelvic ultrasound.    5.  Small bilateral pleural effusions with adjacent atelectasis.    6.  Indeterminate hypodense lesion in the right hepatic lobe measuring 1.3 cm, favor hemangioma. Recommend nonemergent liver mass protocol MRI to further characterize.    REFERENCE:  Management of Incidental Adrenal Masses: A White Paper of the ACR Incidental Findings Committee. J Am Elizabeth Radiol 2017;14:2532-4316.    ? 4 cm:   No cancer history: Consider resection.  Positive cancer history: PET/CT or biopsy.

## 2024-07-24 NOTE — ANESTHESIA CARE TRANSFER NOTE
Patient: Lila Jenkins    Procedure: Procedure(s):  FASCIOTOMY, LOWER EXTREMITY IRRIGATION AND DEBRIDEMENT AND CLOSURE       Diagnosis: H/O fasciotomy [Z98.890]  Diagnosis Additional Information: No value filed.    Anesthesia Type:   General     Note:    Oropharynx: oropharynx clear of all foreign objects and spontaneously breathing  Level of Consciousness: awake  Oxygen Supplementation: face mask  Level of Supplemental Oxygen (L/min / FiO2): 8  Independent Airway: airway patency satisfactory and stable  Dentition: dentition unchanged  Vital Signs Stable: post-procedure vital signs reviewed and stable  Report to RN Given: handoff report given  Patient transferred to: PACU    Handoff Report: Identifed the Patient, Identified the Reponsible Provider, Reviewed the pertinent medical history, Discussed the surgical course, Reviewed Intra-OP anesthesia mangement and issues during anesthesia, Set expectations for post-procedure period and Allowed opportunity for questions and acknowledgement of understanding      Vitals:  Vitals Value Taken Time   /70 07/24/24 1130   Temp     Pulse 80 07/24/24 1132   Resp 13 07/24/24 1132   SpO2 100 % 07/24/24 1132   Vitals shown include unfiled device data.    Electronically Signed By: SIMI Sigala CRNA  July 24, 2024  11:34 AM

## 2024-07-24 NOTE — ANESTHESIA PROCEDURE NOTES
Airway       Patient location during procedure: OR  Staff -        Anesthesiologist:  Tyler Mejia MD       CRNA: Haley Lou APRN CRNA       Performed By: anesthesiologist  Consent for Airway        Urgency: elective  Indications and Patient Condition       Indications for airway management: juan josé-procedural       Induction type:intravenous       Mask difficulty assessment: 0 - not attempted    Final Airway Details       Final airway type: supraglottic airway    Supraglottic Airway Details        Type: LMA       Brand: I-Gel       LMA size: 4    Post intubation assessment        Placement verified by: capnometry, equal breath sounds and chest rise        Number of attempts at approach: 1       Number of other approaches attempted: 0       Secured with: tape       Ease of procedure: easy       Dentition: Intact and Unchanged

## 2024-07-24 NOTE — OP NOTE
OPERATIVE NOTE    PROCEDURE DATE: 7/23/2024      PRE-OP DIAGNOSIS: Open left calf 4 compartment fasciotomy sites      POST-OP DIAGNOSES: Same      PROCEDURE PERFORMED: #1.  Pulse lavage of left calf anterior lateral and posterior compartment fasciotomy sites      #2.  Primary closure anterior lateral and posterior fasciotomy sites      SURGEON:  Ken Corrales M.D.      ASSISTANT:  Alessandra Miller CST      ANESTHESIA:  General-LMA       PRE-OP MEDICATIONS: Scheduled IV antibiotics      INDICATIONS FOR PROCEDURE: 74-year-old patient had undergone emergency distal aortic embolic occlusion embolectomy via femoral cutdowns.  Required left fasciotomies due to perfusion.  Patient has done quite well with good distal pulses but still with some motor neurosensory changes to left foot.  Fasciotomy sites have been changed with Xeroform and are ready for closure in the operating room under informed consent      DESCRIPTION OF PROCEDURE: Brought the op and placed spine.  Distended general anesthesia LMA was placed.  Dressings removed from the leg.  Fasciotomy sites have very minimal swelling of the muscles all viable.  Overlying fibrin slough is noted but no signs of infection.  Left leg was prepped with Betadine and sterile drapes were applied and timeout was called.    PULSE LAVAGE: Pulse lavage system was used with 3 L of normal saline on the open fasciotomy sites.  Very clean fields were noted with good vascularity of the muscles and no nonviable tissue requiring any debridement.    CLOSURE: At the time of the fasciotomies they had left multiple 4-0 nylon mattress sutures secured with Steri-Strips.  Many of these are still intact and they were tied.  We also placed multiple interrupted simple and mattress 3-0 nylon sutures.  All tissue closed very easily with no significant tension.    Wounds were infiltrated with 0.5% Marcaine for postop GZ up.  Bacitracin ointment over the closure sites followed by Xeroform-ABD and  Kerlix rolls.    Patient tolerated procedure well.  Was extubated and returned to recovery.  Needle and sponge counts correct.      EBL: 10 mL       COMPLICATIONS: None      OPERATIVE FINDINGS: All muscle viable and well-perfused with minimal swelling      Ken Corrales MD

## 2024-07-24 NOTE — PLAN OF CARE
Goal Outcome Evaluation:      Plan of Care Reviewed With: patient    Overall Patient Progress: improvingOverall Patient Progress: improving     Date & Time: 7/24/24, 7p-7a  Surgery/POD#: POD 2 cystoscopy w/ retrograde pyelogram and L ureteral stent placement; POD 3 aortic embolectomy via B femoral cutdowns with embolectomy of LLE and 4 compartment fasciotomies  Behavior & Aggression: Green  Fall Risk: Yes  Orientation: AO4  ABNL VS/O2: VSS on RA  ABNL Labs: , 267, 244 insulin was given  Pain Management: scheduled tylenol  Bowel/Bladder: Hui w/ milky urine, doing 24 hour urine collection from 1530 7/23/24 to 1530 7/24/24.  flatus+ and BM today  Drains: Hui  Wounds/incision: LLE with gauze and Rooke boot orthotic, skin warm, +2 pedal pulse, able to wiggle toes, numbness in LLE present.  Diet: NPO  Activity Level: Bedrest  Anticipated DC Date: Pending improvement  Significant Information: Argatroban drip infusing at 14 ml/hr, MUST request refills from pharmacy. Fasciotomy closure at 10:30 AM

## 2024-07-25 ENCOUNTER — APPOINTMENT (OUTPATIENT)
Dept: PHYSICAL THERAPY | Facility: CLINIC | Age: 74
DRG: 271 | End: 2024-07-25
Payer: MEDICARE

## 2024-07-25 LAB
ACTH PLAS-MCNC: <10 PG/ML
APTT PPP: 72 SECONDS (ref 22–38)
ERYTHROCYTE [DISTWIDTH] IN BLOOD BY AUTOMATED COUNT: 13.4 % (ref 10–15)
GLUCOSE BLDC GLUCOMTR-MCNC: 112 MG/DL (ref 70–99)
GLUCOSE BLDC GLUCOMTR-MCNC: 175 MG/DL (ref 70–99)
GLUCOSE BLDC GLUCOMTR-MCNC: 213 MG/DL (ref 70–99)
GLUCOSE BLDC GLUCOMTR-MCNC: 225 MG/DL (ref 70–99)
GLUCOSE BLDC GLUCOMTR-MCNC: 246 MG/DL (ref 70–99)
GLUCOSE BLDC GLUCOMTR-MCNC: 77 MG/DL (ref 70–99)
GLUCOSE SERPL-MCNC: 87 MG/DL (ref 70–99)
HCT VFR BLD AUTO: 22.1 % (ref 35–47)
HGB BLD-MCNC: 7.3 G/DL (ref 11.7–15.7)
MAGNESIUM SERPL-MCNC: 1.9 MG/DL (ref 1.7–2.3)
MCH RBC QN AUTO: 32.4 PG (ref 26.5–33)
MCHC RBC AUTO-ENTMCNC: 33 G/DL (ref 31.5–36.5)
MCV RBC AUTO: 98 FL (ref 78–100)
PHOSPHATE SERPL-MCNC: 2.6 MG/DL (ref 2.5–4.5)
PLATELET # BLD AUTO: 626 10E3/UL (ref 150–450)
POTASSIUM SERPL-SCNC: 3.9 MMOL/L (ref 3.4–5.3)
RBC # BLD AUTO: 2.25 10E6/UL (ref 3.8–5.2)
WBC # BLD AUTO: 8.6 10E3/UL (ref 4–11)

## 2024-07-25 PROCEDURE — 250N000011 HC RX IP 250 OP 636: Mod: JZ | Performed by: SURGERY

## 2024-07-25 PROCEDURE — 250N000013 HC RX MED GY IP 250 OP 250 PS 637: Performed by: SURGERY

## 2024-07-25 PROCEDURE — 99231 SBSQ HOSP IP/OBS SF/LOW 25: CPT | Mod: 24

## 2024-07-25 PROCEDURE — 250N000013 HC RX MED GY IP 250 OP 250 PS 637: Performed by: STUDENT IN AN ORGANIZED HEALTH CARE EDUCATION/TRAINING PROGRAM

## 2024-07-25 PROCEDURE — 84100 ASSAY OF PHOSPHORUS: CPT | Performed by: HOSPITALIST

## 2024-07-25 PROCEDURE — 36415 COLL VENOUS BLD VENIPUNCTURE: CPT | Performed by: STUDENT IN AN ORGANIZED HEALTH CARE EDUCATION/TRAINING PROGRAM

## 2024-07-25 PROCEDURE — 250N000011 HC RX IP 250 OP 636: Mod: JZ | Performed by: STUDENT IN AN ORGANIZED HEALTH CARE EDUCATION/TRAINING PROGRAM

## 2024-07-25 PROCEDURE — 97530 THERAPEUTIC ACTIVITIES: CPT | Mod: GP | Performed by: PHYSICAL THERAPIST

## 2024-07-25 PROCEDURE — 83735 ASSAY OF MAGNESIUM: CPT | Performed by: HOSPITALIST

## 2024-07-25 PROCEDURE — 120N000001 HC R&B MED SURG/OB

## 2024-07-25 PROCEDURE — 82947 ASSAY GLUCOSE BLOOD QUANT: CPT | Performed by: STUDENT IN AN ORGANIZED HEALTH CARE EDUCATION/TRAINING PROGRAM

## 2024-07-25 PROCEDURE — 85730 THROMBOPLASTIN TIME PARTIAL: CPT | Performed by: STUDENT IN AN ORGANIZED HEALTH CARE EDUCATION/TRAINING PROGRAM

## 2024-07-25 PROCEDURE — 84132 ASSAY OF SERUM POTASSIUM: CPT | Performed by: HOSPITALIST

## 2024-07-25 PROCEDURE — 250N000013 HC RX MED GY IP 250 OP 250 PS 637

## 2024-07-25 PROCEDURE — 97161 PT EVAL LOW COMPLEX 20 MIN: CPT | Mod: GP | Performed by: PHYSICAL THERAPIST

## 2024-07-25 PROCEDURE — 99232 SBSQ HOSP IP/OBS MODERATE 35: CPT | Performed by: STUDENT IN AN ORGANIZED HEALTH CARE EDUCATION/TRAINING PROGRAM

## 2024-07-25 PROCEDURE — 250N000011 HC RX IP 250 OP 636

## 2024-07-25 PROCEDURE — 85027 COMPLETE CBC AUTOMATED: CPT

## 2024-07-25 RX ORDER — FERROUS SULFATE 325(65) MG
325 TABLET ORAL 2 TIMES DAILY
Status: DISCONTINUED | OUTPATIENT
Start: 2024-07-25 | End: 2024-07-30 | Stop reason: HOSPADM

## 2024-07-25 RX ADMIN — GABAPENTIN 100 MG: 100 CAPSULE ORAL at 20:43

## 2024-07-25 RX ADMIN — OXYCODONE HYDROCHLORIDE 5 MG: 5 TABLET ORAL at 13:14

## 2024-07-25 RX ADMIN — FERROUS SULFATE TAB 325 MG (65 MG ELEMENTAL FE) 325 MG: 325 (65 FE) TAB at 20:43

## 2024-07-25 RX ADMIN — PANTOPRAZOLE SODIUM 40 MG: 40 TABLET, DELAYED RELEASE ORAL at 06:05

## 2024-07-25 RX ADMIN — APIXABAN 5 MG: 5 TABLET, FILM COATED ORAL at 20:43

## 2024-07-25 RX ADMIN — ACETAMINOPHEN 650 MG: 325 TABLET, FILM COATED ORAL at 17:44

## 2024-07-25 RX ADMIN — CEFTRIAXONE SODIUM 1 G: 1 INJECTION, POWDER, FOR SOLUTION INTRAMUSCULAR; INTRAVENOUS at 17:47

## 2024-07-25 RX ADMIN — SENNOSIDES AND DOCUSATE SODIUM 1 TABLET: 50; 8.6 TABLET ORAL at 20:43

## 2024-07-25 RX ADMIN — ARGATROBAN 4.5 MCG/KG/MIN: 50 INJECTION, SOLUTION INTRAVENOUS at 06:00

## 2024-07-25 RX ADMIN — ARGATROBAN 4.5 MCG/KG/MIN: 50 INJECTION, SOLUTION INTRAVENOUS at 09:32

## 2024-07-25 RX ADMIN — GABAPENTIN 100 MG: 100 CAPSULE ORAL at 08:28

## 2024-07-25 RX ADMIN — ASPIRIN 81 MG: 81 TABLET, COATED ORAL at 08:28

## 2024-07-25 RX ADMIN — ARGATROBAN 4.5 MCG/KG/MIN: 50 INJECTION, SOLUTION INTRAVENOUS at 13:43

## 2024-07-25 RX ADMIN — ARGATROBAN 4.5 MCG/KG/MIN: 50 INJECTION, SOLUTION INTRAVENOUS at 01:39

## 2024-07-25 RX ADMIN — FERROUS SULFATE TAB 325 MG (65 MG ELEMENTAL FE) 325 MG: 325 (65 FE) TAB at 08:28

## 2024-07-25 RX ADMIN — INSULIN DETEMIR 12 UNITS: 100 INJECTION, SOLUTION SUBCUTANEOUS at 21:50

## 2024-07-25 RX ADMIN — OXYCODONE HYDROCHLORIDE 5 MG: 5 TABLET ORAL at 17:44

## 2024-07-25 RX ADMIN — ARGATROBAN 4.5 MCG/KG/MIN: 50 INJECTION, SOLUTION INTRAVENOUS at 17:41

## 2024-07-25 RX ADMIN — SENNOSIDES AND DOCUSATE SODIUM 1 TABLET: 50; 8.6 TABLET ORAL at 08:46

## 2024-07-25 RX ADMIN — GABAPENTIN 100 MG: 100 CAPSULE ORAL at 13:14

## 2024-07-25 ASSESSMENT — ACTIVITIES OF DAILY LIVING (ADL)
ADLS_ACUITY_SCORE: 38
ADLS_ACUITY_SCORE: 38
ADLS_ACUITY_SCORE: 43
ADLS_ACUITY_SCORE: 45
ADLS_ACUITY_SCORE: 28
ADLS_ACUITY_SCORE: 33
ADLS_ACUITY_SCORE: 28
ADLS_ACUITY_SCORE: 45
ADLS_ACUITY_SCORE: 45
ADLS_ACUITY_SCORE: 28
ADLS_ACUITY_SCORE: 45
ADLS_ACUITY_SCORE: 28
ADLS_ACUITY_SCORE: 33
ADLS_ACUITY_SCORE: 28
ADLS_ACUITY_SCORE: 28
ADLS_ACUITY_SCORE: 45
ADLS_ACUITY_SCORE: 38

## 2024-07-25 NOTE — PROGRESS NOTES
"   07/25/24 1158   Appointment Info   Signing Clinician's Name / Credentials (PT) Jacqueline Montanez, PT   Living Environment   People in Home alone   Current Living Arrangements house   Home Accessibility stairs to enter home   Number of Stairs, Main Entrance   (4 to landing, then 1.)   Stair Railings, Main Entrance railings safe and in good condition;railing on right side (ascending)   Transportation Anticipated family or friend will provide;car, drives self  (Son and 2 daughters could provide.)   Living Environment Comments Main level living once inside. I without AD prior to admission.   Self-Care   Usual Activity Tolerance good   Regular Exercise Yes   Activity/Exercise Type strength training   Exercise Amount/Frequency daily   Equipment Currently Used at Home raised toilet seat;cane, straight;walker, rolling   Fall history within last six months no   General Information   Onset of Illness/Injury or Date of Surgery 07/21/24   Referring Physician Rosie Brown MD   Patient/Family Therapy Goals Statement (PT) Didn't state.   Pertinent History of Current Problem (include personal factors and/or comorbidities that impact the POC) Lila Jenkins is a 74 year old female with past medical history significant for type II DM, HTN, recurrent UTI, nephrolithiasis admitted on 7/21/2024 with acute limb ischemia. S/P  Pulse lavage of left calf anterior lateral and posterior compartment fasciotomy sites                                                   Primary closure anterior lateral and posterior fasciotomy sites   Existing Precautions/Restrictions fall   Weight-Bearing Status - LLE other (see comments)  (No weight bearing restrictions noted in chart.)   Cognition   Affect/Mental Status (Cognition) WNL   Orientation Status (Cognition) oriented x 4   Pain Assessment   Patient Currently in Pain Yes, see Vital Sign flowsheet  (Significantl Left LE pain with any mobility. Rated a \"6\" but appeared to be more at times.) "   Integumentary/Edema   Integumentary/Edema Comments Has Kerlix bandage from left toes to knee.   Posture    Posture Not impaired   Range of Motion (ROM)   ROM Comment Left DF limited to about 5 degrees plantarflexion. Unwilling to bend left knee more due to calf pain.   Strength (Manual Muscle Testing)   Strength Comments Strength left LE limited by pain. Can lift left LE off the bed but uses UE's to assist. Left DF strength limited to 2- due to pain and limited range.   Bed Mobility   Comment, (Bed Mobility) Sup to sit with min A to manage left LE due to pain.   Transfers   Comment, (Transfers) Unable to tolerate   Gait/Stairs (Locomotion)   Comment, (Gait/Stairs) Unable to tolerate   Balance   Balance Comments Good sitting balance.   Clinical Impression   Criteria for Skilled Therapeutic Intervention Yes, treatment indicated   PT Diagnosis (PT) Impaired functional independence   Influenced by the following impairments Pain, left LE with decreased strength and decreased left ankle ROM   Functional limitations due to impairments Needs A for all functional mobility   Clinical Presentation (PT Evaluation Complexity) stable   Clinical Presentation Rationale Clinical judgement   Clinical Decision Making (Complexity) low complexity   Planned Therapy Interventions (PT) bed mobility training;gait training;transfer training;stair training   Risk & Benefits of therapy have been explained evaluation/treatment results reviewed;care plan/treatment goals reviewed;risks/benefits reviewed;current/potential barriers reviewed;participants voiced agreement with care plan;participants included;patient   PT Total Evaluation Time   PT Eval, Low Complexity Minutes (04406) 14   Physical Therapy Goals   PT Frequency Daily   PT Predicted Duration/Target Date for Goal Attainment 07/29/24   PT Goals Bed Mobility;Transfers;Gait;Stairs   PT: Bed Mobility Supervision/stand-by assist;Supine to/from sit;Rolling   PT: Transfers Minimal assist;Sit  to/from stand;Bed to/from chair;Assistive device   PT: Gait Supervision/stand-by assist;Rolling walker;25 feet   PT: Stairs Minimal assist;4 stairs;Rail on right   Interventions   Interventions Quick Adds Therapeutic Activity   Therapeutic Activity   Therapeutic Activities: dynamic activities to improve functional performance Minutes (43656) 25   Symptoms Noted During/After Treatment Increased pain   Treatment Detail/Skilled Intervention Patient educated on technique for sup to sit. Patient takes extra time due to pain and wants to do everything slowly including lowering the legs on the bed. Needed min A to support left LE when transferring to sitting position. Good sitting balance but c/o nausea, dizziness and pain. Did not have the right size walker and took rehab extra time to get correct height walker. Patient unable to tolerate sitting that long so unable to transfer to chair. Transferred sit to sup with min A to manage left LE. Able to scoot up in bed with therapist just supporting left LE. Patient had soft boot on left LE when therapist arrived. Also has hard PRAFO. Therapist applied hard PRAFO at end of session to maintain more DF and educated patient on why. PT and patient agreed to use hard PRAFO during the day and soft support at night.   PT Discharge Planning   PT Plan Coordinate with nurse to premedicate, progress mobilty as she tolerates, left DF stretching.   PT Discharge Recommendation (DC Rec) Transitional Care Facility   PT Rationale for DC Rec Patient lives alone and currently well below her independent baseline.   Total Session Time   Timed Code Treatment Minutes 25   Total Session Time (sum of timed and untimed services) 39

## 2024-07-25 NOTE — PROGRESS NOTES
Notified provider about indwelling ross catheter discussed removal or continued need.    Did provider choose to remove indwelling ross catheter? No    Provider's ross indication for keeping indwelling ross catheter: Suspected UTI    Is there an order for indwelling ross catheter? Yes    *If there is a plan to keep ross catheter in place at discharge daily notification with provider is not necessary, but please add a notation in the treatment team sticky note that the patient will be discharging with the catheter.

## 2024-07-25 NOTE — PLAN OF CARE
Date & Time: 7/25/24, 9694-4070     Surgery/POD#: POD 4 cystoscopy w/ retrograde pyelogram and L ureteral stent placement; POD 3 aortic embolectomy via B femoral cutdowns with embolectomy of LLE and 4 compartment fasciotomies. POD 1 LLE I&D, closure and fasciotomy  Behavior & Aggression: Green  Fall Risk: Yes  Orientation: A&O4  ABNL VS/O2: VSS on RA  ABNL Labs: See chart, on all protocols. WNL. Blood sugar w/CC - 77, 246, 175  Pain Management: Scheduled tylenol  Bowel/Bladder: Hui catheter w/ adequate urine output, bowel sounds hypoactive, + flatus, no BM  Drains: No BM during shift. Patient reporting previous loose incontinent BM  Wounds/incision: R/L groin sites w/ liquid bandage, LLE w/ dressing & Rooke boot  Diet: Mod carb  Activity Level:  Attempted OOB Ax2, very painful for patient. PT will call tomorrow before coming to give pain meds before   Anticipated DC Date: Pending improvement  Significant Information: Argatroban drip infusing at 4.5 ml/hr, MUST request refills from pharmacy.

## 2024-07-25 NOTE — PROGRESS NOTES
Surgery/POD#: POD 4 cystoscopy w/ retrograde pyelogram and L ureteral stent placement; POD 3 aortic embolectomy via B femoral cutdowns with embolectomy of LLE and 4 compartment fasciotomies. POD 1 LLE I&D, closure and fasciotomy.  A&OX4, VSS on RA. C/o pain in the LLE, tylenol and Oxy  given, recheck pending. Dressing on the LLE and bilateral groins c/d/I. Has not been OOB this shift. Will need to be pre medicated before activity, especially before PT  sessions tomorrow. Please coordinate this. Hui with good UO. Mod carb diet. BG  was 225. On IV Rocephin. Plan to trx to Eliquis fron Argatroban tonight. 2 PIVs intact. Plan to discharge to TCU pending placement. Vascular surgery following. Urology signed off. Hui needs to be discontinued before discharge. Continue to monitor.

## 2024-07-25 NOTE — PLAN OF CARE
Goal Outcome Evaluation:      Plan of Care Reviewed With: patient    Overall Patient Progress: improvingOverall Patient Progress: improving       Shift: 7/24/2024, 1854-4856  POD# 2 cystoscopy w/ retrograde pyelogram and L ureteral stent placement  POD# 3 aortic embolectomy via B femoral cutdowns with embolectomy of LLE and 4 compartment fasciotomies  POD# 0 LLE I&D, closure and fasciotomy    Orientation: A&Ox4  Vital Signs: VSS on RA  Labs: K+, Mag, Phos protocol, redraw in AM; PTT redraw in AM  Pain Management: Scheduled tylenol  Bowel: 2 BM today  Bladder: Hui in place UOP good  Drains: Hui  IV: 2 R PIV, Argatroban drip running at 4.5mcg/kg/min  Wounds/Incisions: LLE with gauze and Rooke boot orthotic, skin warm, +2 pedal pulse, able to wiggle toes, numbness in LLE present.  Diet: Mod carb  Activity Level: Patient has not been out of bed; waiting for PT input on weightbearing status.  Anticipated Discharge Date/Plan: Pending improvement  Significant Information: Argatroban drip infusing at 4.5 ml/hr, MUST request refills from pharmacy.

## 2024-07-25 NOTE — PLAN OF CARE
Goal Outcome Evaluation:      Plan of Care Reviewed With: patient    Overall Patient Progress: improvingOverall Patient Progress: improving    Shift: 7p-7a  Surgery/POD#: POD 4 from a bilat fem cutdown aortic amerron embolecotomy of LLE, LLE fasciotomy & left tibial embolectomy. POD 3 from a cysctoscopy with retrograde pyelogram & left ureteral stent placement. POD 1 from a LLE I&D & fasciotomy closure.  Behavior & Aggression: Green  Fall Risk: Yes  Orientation: A&O x4  ABNL VS/O2: VSS, ex 1L NC while asleep  Tele: NA  ABNL Labs: K, Mg & Phos replacement protocols - rechecks this AM; Hgb 7.4  Pain Management: denies pain  Bowel/Bladder: ross catheter w/ adequate urine output, bowel sounds hypoactive, passing some flatus & no BM since last procedure  Drains: ross cath  Lines: PIV infusing argatroban @ 4.5 mcg/kg/min; PIV Sl'd w/ int Abx  Skin: R/L groin sites w/ liquid bandage, LLE w/ dressing & Rooke boot  Diet: mod carb, denies N/V  Activity Level: NOOBY, T/R (intermittently refused overnight)  Tests/Procedures: NA  Anticipated  DC Date: pending  Significant Information:   IS at the bedside, PCD on RLE, elevating LLE. Vascular, SW, Hem/Onc & hospitalist following. PT/OT consulted, but have not seen patient yet. Urology signed off.

## 2024-07-25 NOTE — PROGRESS NOTES
[facility]    Medicine Progress Note - Hospitalist Service    Date of Admission:  7/21/2024    Assessment & Plan   Lila Jenkins is a 74 year old female with past medical history significant for type II DM, HTN, recurrent UTI, nephrolithiasis admitted on 7/21/2024 with acute limb ischemia.      Pt presented to an outside hospital on 7/21 with acute onset left lower extremity numbness and pain. 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 LLE with occlusion noted in below knee popliteal artery with scattered runoff seen to the LLE, no inline flow to the foot. She has bety 2B acute limb ischemia resulting from acute aortic occlusion with distal embolization to LLE tibial vessels. She was transferred to [facility] for vascular surgery consultation. Pt was taken emergently for Aortic embolectomy, left anterior tibial embolectomy, bilateral iliac artery embolization and 4 compartment fasciotomies of the LLE. The procedure was well tolerated. The patient was extubated and stable upon arrival to the ICU. The hospitalist service was consulted for medical co-management.      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 with Bety 2B ischemia   S/p Aortic embolectomy, left anterior tibial embolectomy, bilateral iliac artery embolization and 4 compartment fasciotomies of the LLE (7/21/24)   S/p primary closure of anterior lateral and posterior fasciotomy sites 7/24/24  - Post-operative cares, dressing changes, anticoagulation, pain control, activity restrictions per vascular surgery    - Pt currently anticoagulated with argatroban due to allergy to heparin component, continue per vascular   - CTA chest and Echo ordered per vascular surgery for hypercoag work-up, consider heme/onc consultation  -  "per vascular: \"Pt with foot drop and ongoing neuro deficits, not surprising given >24 hours ischemia time, these will take time to recover and may not recover at all.\"  - Orthotics consult for foot drop boot   - consult heme onc for arterial clot  - will need a minimum of 6 months of anticoagulation. Plan coumadin vs apixaban. Patient to work on applying for financial assistance for apixaban    Type II DM   Hyperglycemia  Very poorly controlled. Hemoglobin A1C is 10.9% on admission. Blood sugars in the high-200-500 range currently.   PTA regimen includes Detemir 12 units at bedtime, Metformin 750mg BID, and Berberine.   - Resume Detemir, 12 units   - MDSSI   - aspart 1u/15CHO  - Hypoglycemia protocol   - Hold PTA metformin and berberine      Acute Blood Loss Anemia   Reactive Thrombocytosis   Hemoglobin on admission was around 9. 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 Additional left renal calyceal calculi. Air in the left renal calices along with perinephric stranding concern for emphysematous pyelonephritis. Non-obstructing R nephrolithiasis.   * Urology consulted and recommending ureteral stent placement today at 7pm given concern for infected urine/pus behind the stone in her left prox ureter.   * No urine culture collected at outside facility prior to transfer. Grew a pan-sensitive e.coli on 7/3/24 in urine  * Urine culture from 7/21 at [facility] is no growth  - Plan 10 days of empiric treatment given degree of illness  - Appreciate urology recommendations. Signed off 7/23/24.    - Follow up with local urologist in [place] for definitive stone " management. Stones should be treated and stent removed within 3 months      Prolonged QT  QTc on admission is 499   - Avoid QT prolonging medications   - Telemetry      Leukocytosis, resolved  WBC initially within normal limits at 1.0, but then acutely koffi to 21.2 on admission, now down to 13.3.   - Monitor   - On Ceftriaxone as noted above for possible UTI.      Hypokalemia  Hypomagnesemia  Hypophosphatemia  - Replacement protocols ordered     Supplement Use   Pt takes a number of supplements including Alpha lipoic acid, Ashwaganda, Berberine, Bilberry, Biotin, Cinnamon, Collagen, Echinacea, Ginko Biloba, Culturell, Lecithin, Moringa Oleifera, Multivitamin, Vitamin C, and Vitamin D   - Will hold all supplements while hospitalized   - Pt will need long-term anticoagulation with coumadin or apixaban, will need to discuss with pharmacy if this could interact with any of these supplements      Adrenal nodule   CT chest showed 4.6cm left adrenal nodule. Review of outside paperwork suggests this has been previously identified and stable from prior   - Continue outpatient evaluation          Diet: Advance Diet as Tolerated: Fully Advanced to diet(s) per Provider order; Regular Diet Adult; Moderate Consistent Carb (60 g CHO per Meal) Diet    DVT Prophylaxis: argatroban  Hui Catheter: PRESENT, indication: Surgical procedure, Surgical procedure  Lines: None     Cardiac Monitoring: None  Code Status: Full Code      Clinically Significant Risk Factors              # Hypoalbuminemia: Lowest albumin = 2.9 g/dL at 7/21/2024  8:05 PM, will monitor as appropriate                 #Precipitous drop in Hgb/Hct: Lowest Hgb this hospitalization: 7 g/dL. Will continue to monitor and treat/transfuse as appropriate.    # DMII: A1C = 10.9 % (Ref range: <5.7 %) within past 6 months         # Financial/Environmental Concerns: none         Disposition Plan     Medically Ready for Discharge: Anticipated in 2-4 Days             Flavio TORREZ  MD Michi  Hospitalist Service  [facility]  Securely message with [employer] (more info)  Text page via [employer] Paging/Directory   ______________________________________________________________________    Interval History   Pain improved after fasciotomy closure yesterday. Stood up to get to chair today and had cramp in her leg. Talked about AC and AC options. Discussed insurance issues with AC. Provided information on how to apply for help from . Discussed dispo location options.   Discussed with RN, SW, CC, Dr. Corrales  Physical Exam   Vital Signs: Temp: 98.1  F (36.7  C) Temp src: Oral BP: 133/58 Pulse: 81   Resp: 18 SpO2: 95 % O2 Device: None (Room air) Oxygen Delivery: 1 LPM  Weight: 110 lbs 3.68 oz    Constitutional: Awake, alert, cooperative, no apparent distress  Respiratory: Clear to auscultation bilaterally, no crackles or wheezing  Cardiovascular: Regular rate and rhythm, normal S1 and S2, and no murmur noted  GI: Normal bowel sounds, soft, non-distended, non-tender  Skin/Integumen: No rashes, no cyanosis, no edema of exposed skin.   Other:       Medical Decision Making       45 MINUTES SPENT BY ME on the date of service doing chart review, history, exam, documentation & further activities per the note.      Data   ------------------------- PAST 24 HR DATA REVIEWED -----------------------------------------------    I have personally reviewed the following data over the past 24 hrs:    8.6  \   7.3 (L)   / 626 (H)     N/A N/A N/A /  175 (H)   3.9 N/A N/A \     INR:  N/A PTT:  72 (H)   D-dimer:  N/A Fibrinogen:  N/A       Imaging results reviewed over the past 24 hrs:   No results found for this or any previous visit (from the past 24 hour(s)).

## 2024-07-25 NOTE — PROGRESS NOTES
.Vascular Surgery Progress Note    S: Very comfortable through the night.      O: Alert and appropriate.  Comfortable.  Conversant.  Vitals:  BP  Min: 98/70  Max: 151/72  Temp  Av.4  F (36.9  C)  Min: 97.8  F (36.6  C)  Max: 98.8  F (37.1  C)  Pulse  Av.3  Min: 79  Max: 94  I/O last 3 completed shifts:  In: 1209.48 [P.O.:360; I.V.:849.48]  Out: 1110 [Urine:1100; Blood:10]    Physical Exam: Left fasciotomy site is easily closed in OR.  Using Rooke boot.    Still weakness with dorsiflexion slightly better t.  Good distal pulses      AM  Hgb=7.3        Assessment/Plan: #1.  Very stable from vascular standpoint    Fasciotomy sutures were removed in approximately 2 weeks.  No restrictions to activities.    -- Stable acute blood loss anemia on iron supplements      #2.  Needs anticoagulation for a minimum of 3 months.  Apparently can be placed on DOVAC with supplemental coupon to make this more affordable.  I discussed this with her daughter yesterday.    Transition patient over to DOVAC and Discontinue IV argatroban pain    #3.  Larger left adrenal mass.  Being evaluated by oncology.  She may require eventual surgical treatment but this needs to be after patient has completed course of anticoagulation.  Discussed in daughter and patient.              Wm. Savanna MD

## 2024-07-25 NOTE — PROGRESS NOTES
"   07/25/24 1158   Appointment Info   Signing Clinician's Name / Credentials (PT) Jacqueline Montanez, PT   Living Environment   People in Home alone   Current Living Arrangements house   Home Accessibility stairs to enter home   Number of Stairs, Main Entrance   (4 to landing, then 1.)   Stair Railings, Main Entrance railings safe and in good condition;railing on right side (ascending)   Transportation Anticipated family or friend will provide;car, drives self  (Son and 2 daughters could provide.)   Living Environment Comments Main level living once inside. I without AD prior to admission.   Self-Care   Usual Activity Tolerance good   Regular Exercise Yes   Activity/Exercise Type strength training   Exercise Amount/Frequency daily   Equipment Currently Used at Home raised toilet seat;cane, straight;walker, rolling   Fall history within last six months no   General Information   Onset of Illness/Injury or Date of Surgery 07/21/24   Referring Physician Rosie Brown MD   Patient/Family Therapy Goals Statement (PT) Didn't state.   Pertinent History of Current Problem (include personal factors and/or comorbidities that impact the POC) Lila Jenkins is a 74 year old female with past medical history significant for type II DM, HTN, recurrent UTI, nephrolithiasis admitted on 7/21/2024 with acute limb ischemia. S/P  Pulse lavage of left calf anterior lateral and posterior compartment fasciotomy sites                                                   Primary closure anterior lateral and posterior fasciotomy sites   Existing Precautions/Restrictions fall   Weight-Bearing Status - LLE other (see comments)  (No weight bearing restrictions noted in chart.)   Cognition   Affect/Mental Status (Cognition) WNL   Orientation Status (Cognition) oriented x 4   Pain Assessment   Patient Currently in Pain Yes, see Vital Sign flowsheet  (Significantl Left LE pain with any mobility. Rated a \"6\" but appeared to be more at times.) "   Integumentary/Edema   Integumentary/Edema Comments Has Kerlix bandage from left toes to knee.   Posture    Posture Not impaired   Range of Motion (ROM)   ROM Comment Left DF limited to about 5 degrees plantarflexion. Unwilling to bend left knee more due to calf pain.   Strength (Manual Muscle Testing)   Strength Comments Strength left LE limited by pain. Can lift left LE off the bed but uses UE's to assist. Left DF strength limited to 2- due to pain and limited range.   Bed Mobility   Comment, (Bed Mobility) Sup to sit with min A to manage left LE due to pain.   Transfers   Comment, (Transfers) Unable to tolerate   Gait/Stairs (Locomotion)   Comment, (Gait/Stairs) Unable to tolerate   Balance   Balance Comments Good sitting balance.   Clinical Impression   Criteria for Skilled Therapeutic Intervention Yes, treatment indicated   PT Diagnosis (PT) Impaired functional independence   Influenced by the following impairments Pain, left LE with decreased strength and decreased left ankle ROM   Functional limitations due to impairments Needs A for all functional mobility   Clinical Presentation (PT Evaluation Complexity) stable   Clinical Presentation Rationale Clinical judgement   Clinical Decision Making (Complexity) low complexity   Planned Therapy Interventions (PT) bed mobility training;gait training;transfer training;stair training   Risk & Benefits of therapy have been explained evaluation/treatment results reviewed;care plan/treatment goals reviewed;risks/benefits reviewed;current/potential barriers reviewed;participants voiced agreement with care plan;participants included;patient   PT Total Evaluation Time   PT Eval, Low Complexity Minutes (87176) 14   Physical Therapy Goals   PT Frequency Daily   PT Predicted Duration/Target Date for Goal Attainment 07/29/24   PT Goals Bed Mobility;Transfers;Gait;Stairs   PT: Bed Mobility Supervision/stand-by assist;Supine to/from sit;Rolling   PT: Transfers Minimal assist;Sit  to/from stand;Bed to/from chair;Assistive device   PT: Gait Supervision/stand-by assist;Rolling walker;25 feet   PT: Stairs Minimal assist;4 stairs;Rail on right   Interventions   Interventions Quick Adds Therapeutic Activity   Therapeutic Activity   Therapeutic Activities: dynamic activities to improve functional performance Minutes (97466) 25   Symptoms Noted During/After Treatment Increased pain   Treatment Detail/Skilled Intervention Patient educated on technique for sup to sit. Patient takes extra time due to pain and wants to do everything slowly including lowering the legs on the bed. Needed min A to support left LE when transferring to sitting position. Good sitting balance but c/o nausea, dizziness and pain. Did not have the right size walker and took rehab extra time to get correct height walker. Patient unable to tolerate sitting that long so unable to transfer to chair. Transferred sit to sup with min A to manage left LE. Able to scoot up in bed with therapist just supporting left LE. Patient had soft boot on left LE when therapist arrived. Also has hard PRAFO. Therapist applied hard PRAFO at end of session to maintain more DF and educated patient on why. PT and patient agreed to use hard PRAFO during the day and soft support at night.   PT Discharge Planning   PT Plan Coordinate with nurse to premedicate, progress mobilty as she tolerates, left DF stretching.   PT Discharge Recommendation (DC Rec) Transitional Care Facility   PT Rationale for DC Rec Patient lives alone and currently well below her independent baseline. Today, patient only able to tolerate sitting on edge of bed for around 10 min. Unable to tolerate transfers due to pain. She would benefit from continued skilled PT to progress indeepndence and activity tolerance prior to discharging to home.   PT Brief overview of current status A of 2, ? try SS   Total Session Time   Timed Code Treatment Minutes 25   Total Session Time (sum of timed and  untimed services) 39

## 2024-07-25 NOTE — PROGRESS NOTES
Hematology/Oncology Follow-up Note  [facility]    Today's Date: 07/25/24   Date of Admission:  7/21/2024   Reason for Consult: aortic thrombus s/p thrombectomy    ASSESSMENT/ PLAN : Lila Jenkins is a 74 year old year old female presented to the hospital with acute onset LLE numbness and pain requiring emergent aortic embolectomy and fasciotomies on 7/21/24.    Aortic thrombus  CTA on 7/21/24  IMPRESSION:  1.  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 Also small dissection in the proximal right external iliac artery.  2.  Intraluminal filling defect in the right common femoral artery both hard and soft plaque and relatively narrow, though extends beyond long-term through the arterial lumen.  3.  Diffuse atherosclerotic calcification throughout the infrarenal abdominal aorta. No obvious or overt area of ulceration.  4.  Moderate to severe stenosis in the proximal left renal artery.  5.  Vascular left adrenal mass measuring 4.7 x 3.4 cm. Exact etiology is uncertain, though malignancy is certainly a consideration. Would consider MRI or biopsy for most definitive evaluation.    CT abdomen 7/21/24  IMPRESSION:  There is a heterogeneously enhancing left adrenal mass measuring 4.3 cm. Washout characteristics are indeterminate for adrenal adenoma. Given size greater than 4 cm, surgical consultation recommended.   Indeterminate right adnexal mass measuring 5.5 cm. Recommend further evaluation with pelvic ultrasound.      PLAN:  Suspicion of provoked thrombus multifactorial- adrenal mass, infection.   Thrombophilia labs pending  Serum markers pending  From a hematological perspective okay to stop argatroban IV and switch to oral anticoagulation  Will continue to follow   Will need to follow up with provider closer to home, anticipating anticoagulation for 6 months.       DISCUSSION:  Patient is doing well  after her procedure yesterday. She has good feeling in her leg and expresses feeling grateful that she didn't lose her limb. I explained that our team was brought on due to concern for where her thrombus originated. There are bilateral adrenal masses noted on CT that we are investigating into further with lab work. Other labs to investigate hypercoagulability are pending as well. The patient reported feeling anxious with mentioning an adrenal mass. I explained that we are still in the investigating phase and do not have all the information right now. I will update as results come.     MEDICATIONS:  Reviewed     LAB:  Reviewed     PHYSICAL EXAM:  Vital signs:  Temp: 98.1  F (36.7  C) Temp src: Oral BP: 133/58 Pulse: 81   Resp: 18 SpO2: 95 % O2 Device: None (Room air) Oxygen Delivery: 1 LPM Height: 152.4 cm (5') Weight: 50 kg (110 lb 3.7 oz)  Estimated body mass index is 21.53 kg/m  as calculated from the following:    Height as of this encounter: 1.524 m (5').    Weight as of this encounter: 50 kg (110 lb 3.7 oz).      GENERAL/CONSTITUTIONAL: No acute distress.      Thank you for involving us in this patients care.    iBanka BELCHER, CNP  Hematology/Oncology  [facility]   Pager #[phone]

## 2024-07-25 NOTE — PROGRESS NOTES
Care Management Follow Up    Length of Stay (days): 4    Expected Discharge Date: 07/29/2024     Concerns to be Addressed: discharge planning   (Distance to get back home)  Patient plan of care discussed at interdisciplinary rounds: Yes    Anticipated Discharge Disposition: Skilled Nursing Facility     Anticipated Discharge Services:    Anticipated Discharge DME:      Patient/family educated on Medicare website which has current facility and service quality ratings: yes  Education Provided on the Discharge Plan: Yes  Patient/Family in Agreement with the Plan: yes    Referrals Placed by CM/SW:    Private pay costs discussed: Not applicable    Additional Information:  Writer sent TCU/SNF referrals to (hand fax and communications tab)     [facility] (SNF and ARU)  7  - fax   8  - fax     [facility]   4    FAX 8     [facility]   [facility]    Fax -  9      Spoke with Lilia Alvarado ([facility])  3  who will see if they can clinically accept patient into ARU versus SNF - the SNF facility is full but they are looking information over knowing patient not ready for 2- 4 days and they do not accept any referrals over the weekend    Spoke to Jian  at [facility]   8  and faxed to 3  she reports facility likely full    Spoke to Grady at Critical access hospital and Rehab   They do have bed availability and referral was sent.     Writer spoke with family regarding cost of eliquis and/or Zarelto as patient does not currently have Medicare Part D, or a supplement. Family is happy to assist with filling put paper work to lower drug costs.     Writer heard back from [facility] - they could not clinically accept into their ARU in [place] until patient can tolerate 3 hours of PT and also they need notes from OT. - this is families preferred facility. As pain is tolerated and patient  able to participate more in therapy, ARU may be appropriate versus TCU. Will continue to monitor PT recommendations and send updated clinical notes to facilities           BOBBI Dhaliwal

## 2024-07-26 ENCOUNTER — APPOINTMENT (OUTPATIENT)
Dept: OCCUPATIONAL THERAPY | Facility: CLINIC | Age: 74
DRG: 271 | End: 2024-07-26
Payer: MEDICARE

## 2024-07-26 ENCOUNTER — APPOINTMENT (OUTPATIENT)
Dept: PHYSICAL THERAPY | Facility: CLINIC | Age: 74
DRG: 271 | End: 2024-07-26
Attending: SURGERY
Payer: MEDICARE

## 2024-07-26 LAB
ALDOST SERPL-MCNC: <3 NG/DL (ref 0–31)
ALDOST/RENIN PLAS-RTO: NORMAL {RATIO}
ANNOTATION COMMENT IMP: NORMAL
GLUCOSE BLDC GLUCOMTR-MCNC: 136 MG/DL (ref 70–99)
GLUCOSE BLDC GLUCOMTR-MCNC: 139 MG/DL (ref 70–99)
GLUCOSE BLDC GLUCOMTR-MCNC: 215 MG/DL (ref 70–99)
GLUCOSE BLDC GLUCOMTR-MCNC: 241 MG/DL (ref 70–99)
GLUCOSE BLDC GLUCOMTR-MCNC: 75 MG/DL (ref 70–99)
GLUCOSE SERPL-MCNC: 189 MG/DL (ref 70–99)
INR PPP: 1.28 (ref 0.85–1.15)
MAGNESIUM SERPL-MCNC: 1.8 MG/DL (ref 1.7–2.3)
METANEPHS SERPL-SCNC: 0.11 NMOL/L
NORMETANEPHRINE SERPL-SCNC: 0.3 NMOL/L
PHOSPHATE SERPL-MCNC: 2.5 MG/DL (ref 2.5–4.5)
POTASSIUM SERPL-SCNC: 3.9 MMOL/L (ref 3.4–5.3)
RENIN PLAS-CCNC: 0.3 NG/ML/HR

## 2024-07-26 PROCEDURE — 250N000013 HC RX MED GY IP 250 OP 250 PS 637: Performed by: SURGERY

## 2024-07-26 PROCEDURE — 250N000013 HC RX MED GY IP 250 OP 250 PS 637

## 2024-07-26 PROCEDURE — 99232 SBSQ HOSP IP/OBS MODERATE 35: CPT | Performed by: STUDENT IN AN ORGANIZED HEALTH CARE EDUCATION/TRAINING PROGRAM

## 2024-07-26 PROCEDURE — 250N000011 HC RX IP 250 OP 636

## 2024-07-26 PROCEDURE — 250N000013 HC RX MED GY IP 250 OP 250 PS 637: Performed by: STUDENT IN AN ORGANIZED HEALTH CARE EDUCATION/TRAINING PROGRAM

## 2024-07-26 PROCEDURE — 83735 ASSAY OF MAGNESIUM: CPT | Performed by: STUDENT IN AN ORGANIZED HEALTH CARE EDUCATION/TRAINING PROGRAM

## 2024-07-26 PROCEDURE — 97530 THERAPEUTIC ACTIVITIES: CPT | Mod: GP | Performed by: PHYSICAL THERAPIST

## 2024-07-26 PROCEDURE — 97110 THERAPEUTIC EXERCISES: CPT | Mod: GP | Performed by: PHYSICAL THERAPIST

## 2024-07-26 PROCEDURE — 97535 SELF CARE MNGMENT TRAINING: CPT | Mod: GO

## 2024-07-26 PROCEDURE — 120N000001 HC R&B MED SURG/OB

## 2024-07-26 PROCEDURE — 82947 ASSAY GLUCOSE BLOOD QUANT: CPT | Performed by: STUDENT IN AN ORGANIZED HEALTH CARE EDUCATION/TRAINING PROGRAM

## 2024-07-26 PROCEDURE — 84100 ASSAY OF PHOSPHORUS: CPT | Performed by: STUDENT IN AN ORGANIZED HEALTH CARE EDUCATION/TRAINING PROGRAM

## 2024-07-26 PROCEDURE — 97165 OT EVAL LOW COMPLEX 30 MIN: CPT | Mod: GO

## 2024-07-26 PROCEDURE — 85610 PROTHROMBIN TIME: CPT

## 2024-07-26 PROCEDURE — 99231 SBSQ HOSP IP/OBS SF/LOW 25: CPT | Mod: 24

## 2024-07-26 PROCEDURE — 36415 COLL VENOUS BLD VENIPUNCTURE: CPT

## 2024-07-26 PROCEDURE — 84132 ASSAY OF SERUM POTASSIUM: CPT | Performed by: STUDENT IN AN ORGANIZED HEALTH CARE EDUCATION/TRAINING PROGRAM

## 2024-07-26 PROCEDURE — 36415 COLL VENOUS BLD VENIPUNCTURE: CPT | Performed by: STUDENT IN AN ORGANIZED HEALTH CARE EDUCATION/TRAINING PROGRAM

## 2024-07-26 RX ORDER — FONDAPARINUX SODIUM 5 MG/.4ML
5 INJECTION SUBCUTANEOUS AT BEDTIME
Status: DISCONTINUED | OUTPATIENT
Start: 2024-07-26 | End: 2024-07-30 | Stop reason: HOSPADM

## 2024-07-26 RX ORDER — WARFARIN SODIUM 2 MG/1
4 TABLET ORAL
Status: COMPLETED | OUTPATIENT
Start: 2024-07-26 | End: 2024-07-26

## 2024-07-26 RX ADMIN — ACETAMINOPHEN 650 MG: 325 TABLET, FILM COATED ORAL at 07:26

## 2024-07-26 RX ADMIN — GABAPENTIN 100 MG: 100 CAPSULE ORAL at 20:33

## 2024-07-26 RX ADMIN — WARFARIN SODIUM 4 MG: 2 TABLET ORAL at 18:36

## 2024-07-26 RX ADMIN — ASPIRIN 81 MG: 81 TABLET, COATED ORAL at 09:06

## 2024-07-26 RX ADMIN — POLYETHYLENE GLYCOL 3350 17 G: 17 POWDER, FOR SOLUTION ORAL at 09:06

## 2024-07-26 RX ADMIN — CEFTRIAXONE SODIUM 1 G: 1 INJECTION, POWDER, FOR SOLUTION INTRAMUSCULAR; INTRAVENOUS at 18:36

## 2024-07-26 RX ADMIN — PANTOPRAZOLE SODIUM 40 MG: 40 TABLET, DELAYED RELEASE ORAL at 06:54

## 2024-07-26 RX ADMIN — ACETAMINOPHEN 650 MG: 325 TABLET, FILM COATED ORAL at 14:52

## 2024-07-26 RX ADMIN — GABAPENTIN 100 MG: 100 CAPSULE ORAL at 13:04

## 2024-07-26 RX ADMIN — FERROUS SULFATE TAB 325 MG (65 MG ELEMENTAL FE) 325 MG: 325 (65 FE) TAB at 09:06

## 2024-07-26 RX ADMIN — SENNOSIDES AND DOCUSATE SODIUM 1 TABLET: 50; 8.6 TABLET ORAL at 20:33

## 2024-07-26 RX ADMIN — FERROUS SULFATE TAB 325 MG (65 MG ELEMENTAL FE) 325 MG: 325 (65 FE) TAB at 20:33

## 2024-07-26 RX ADMIN — OXYCODONE HYDROCHLORIDE 10 MG: 5 TABLET ORAL at 14:52

## 2024-07-26 RX ADMIN — METHOCARBAMOL 500 MG: 500 TABLET ORAL at 09:06

## 2024-07-26 RX ADMIN — INSULIN DETEMIR 12 UNITS: 100 INJECTION, SOLUTION SUBCUTANEOUS at 21:54

## 2024-07-26 RX ADMIN — OXYCODONE HYDROCHLORIDE 5 MG: 5 TABLET ORAL at 09:06

## 2024-07-26 RX ADMIN — METHOCARBAMOL 500 MG: 500 TABLET ORAL at 14:52

## 2024-07-26 RX ADMIN — GABAPENTIN 100 MG: 100 CAPSULE ORAL at 09:06

## 2024-07-26 RX ADMIN — FONDAPARINUX SODIUM 5 MG: 5 INJECTION SUBCUTANEOUS at 22:37

## 2024-07-26 RX ADMIN — APIXABAN 5 MG: 5 TABLET, FILM COATED ORAL at 09:06

## 2024-07-26 RX ADMIN — SENNOSIDES AND DOCUSATE SODIUM 1 TABLET: 50; 8.6 TABLET ORAL at 09:07

## 2024-07-26 ASSESSMENT — ACTIVITIES OF DAILY LIVING (ADL)
ADLS_ACUITY_SCORE: 33
ADLS_ACUITY_SCORE: 32
ADLS_ACUITY_SCORE: 33
ADLS_ACUITY_SCORE: 32
ADLS_ACUITY_SCORE: 32
ADLS_ACUITY_SCORE: 33
ADLS_ACUITY_SCORE: 32
ADLS_ACUITY_SCORE: 33
PREVIOUS_RESPONSIBILITIES: MEAL PREP;HOUSEKEEPING;LAUNDRY;MEDICATION MANAGEMENT;FINANCES;DRIVING
IADL_COMMENTS: PT REPORTS BEING IND W/ ALL IADL'S AT BASELINE PRIOR TO ADMISSION. PT REPORTS THAT THEY STILL DRIVE.
ADLS_ACUITY_SCORE: 33
ADLS_ACUITY_SCORE: 32
ADLS_ACUITY_SCORE: 33

## 2024-07-26 NOTE — PROGRESS NOTES
[facility]    Medicine Progress Note - Hospitalist Service    Date of Admission:  7/21/2024    Assessment & Plan   Lila Jenkins is a 74 year old female with past medical history significant for type II DM, HTN, recurrent UTI, nephrolithiasis admitted on 7/21/2024 with acute limb ischemia.      Pt presented to an outside hospital on 7/21 with acute onset left lower extremity numbness and pain. 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 LLE with occlusion noted in below knee popliteal artery with scattered runoff seen to the LLE, no inline flow to the foot. She has bety 2B acute limb ischemia resulting from acute aortic occlusion with distal embolization to LLE tibial vessels. She was transferred to [facility] for vascular surgery consultation. Pt was taken emergently for Aortic embolectomy, left anterior tibial embolectomy, bilateral iliac artery embolization and 4 compartment fasciotomies of the LLE. The procedure was well tolerated. The patient was extubated and stable upon arrival to the ICU. The hospitalist service was consulted for medical co-management.      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 with Bety 2B ischemia   S/p Aortic embolectomy, left anterior tibial embolectomy, bilateral iliac artery embolization and 4 compartment fasciotomies of the LLE (7/21/24)   S/p primary closure of anterior lateral and posterior fasciotomy sites 7/24/24  - Post-operative cares, dressing changes, anticoagulation, pain control, activity restrictions per vascular surgery    - Pt currently anticoagulated with argatroban due to allergy to heparin component, continue per vascular   - CTA chest and Echo ordered per vascular surgery for hypercoag work-up, consider heme/onc consultation  -  "per vascular: \"Pt with foot drop and ongoing neuro deficits, not surprising given >24 hours ischemia time, these will take time to recover and may not recover at all.\"  - Orthotics consult for foot drop boot   - consult heme onc for arterial clot  - will need a minimum of 6 months of anticoagulation. Plan coumadin vs apixaban. Patient to work on applying for financial assistance for apixaban    Type II DM   Hyperglycemia  Very poorly controlled. Hemoglobin A1C is 10.9% on admission. Blood sugars in the high-200-500 range currently.   PTA regimen includes Detemir 12 units at bedtime, Metformin 750mg BID, and Berberine.   - Resume Detemir, 12 units   - MDSSI   - aspart 1u/15CHO  - Hypoglycemia protocol   - Hold PTA metformin and berberine      Acute Blood Loss Anemia   Reactive Thrombocytosis   Hemoglobin on admission was around 9. 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 Additional left renal calyceal calculi. Air in the left renal calices along with perinephric stranding concern for emphysematous pyelonephritis. Non-obstructing R nephrolithiasis.   * Urology consulted and recommending ureteral stent placement today at 7pm given concern for infected urine/pus behind the stone in her left prox ureter.   * No urine culture collected at outside facility prior to transfer. Grew a pan-sensitive e.coli on 7/3/24 in urine  * Urine culture from 7/21 at [facility] is no growth  - Plan 10 days of empiric treatment given degree of illness despite negative cultures here as no cultures collected at OSH and had already received IV abx.   - Appreciate urology recommendations. " Signed off 7/23/24.    - Follow up with local urologist in [place], [place] for definitive stone management. Stones should be treated and stent removed within 3 months      Prolonged QT  QTc on admission is 499   - Avoid QT prolonging medications   - Telemetry      Leukocytosis, resolved  WBC initially within normal limits at 1.0, but then acutely koffi to 21.2 on admission, now down to 13.3.   - Monitor   - On Ceftriaxone as noted above for possible UTI.      Hypokalemia  Hypomagnesemia  Hypophosphatemia  - Replacement protocols ordered     Supplement Use   Pt takes a number of supplements including Alpha lipoic acid, Ashwaganda, Berberine, Bilberry, Biotin, Cinnamon, Collagen, Echinacea, Ginko Biloba, Culturell, Lecithin, Moringa Oleifera, Multivitamin, Vitamin C, and Vitamin D   - Will hold all supplements while hospitalized   - Pt will need long-term anticoagulation with coumadin or apixaban, will need to discuss with pharmacy if this could interact with any of these supplements      Adrenal nodule   CT chest showed 4.6cm left adrenal nodule. Review of outside paperwork suggests this has been previously identified and stable from prior   - heme/onc w/up for possible malignancy    Small cystic lesions in head of pancreas  - recommend MRCP in 6 months (Jan 2025)          Diet: Advance Diet as Tolerated: Fully Advanced to diet(s) per Provider order; Regular Diet Adult; Moderate Consistent Carb (60 g CHO per Meal) Diet  Snacks/Supplements Adult: Other; Ensure Max; Between Meals    DVT Prophylaxis: argatroban  Hui Catheter: Not present  Lines: None     Cardiac Monitoring: None  Code Status: Full Code      Clinically Significant Risk Factors              # Hypoalbuminemia: Lowest albumin = 2.9 g/dL at 7/21/2024  8:05 PM, will monitor as appropriate                 #Precipitous drop in Hgb/Hct: Lowest Hgb this hospitalization: 7 g/dL. Will continue to monitor and treat/transfuse as appropriate.    # DMII: A1C = 10.9 % (Ref  range: <5.7 %) within past 6 months    # Moderate Malnutrition: based on nutrition assessment      # Financial/Environmental Concerns: none         Disposition Plan     Medically Ready for Discharge: Anticipated in 2-4 Days             Flavio Borden MD  Hospitalist Service  [facility]  Securely message with [employer] (more info)  Text page via [facility] Paging/Directory   ______________________________________________________________________    Interval History   Ongoing discomfort, but actually doing quite well. Transitioned off argatroban yesterday. Will be working with her family to see if they can get her assistance to cover her apixaban from . Working on dispo, likely will continue through the weekend.   Hui out today.  Discussed with RN, Dr. Driscoll  Physical Exam   Vital Signs: Temp: 98  F (36.7  C) Temp src: Oral BP: 97/57 Pulse: 89   Resp: 16 SpO2: 96 % O2 Device: None (Room air)    Weight: 106 lbs 4.19 oz    Constitutional: Awake, alert, cooperative, no apparent distress  Respiratory: Clear to auscultation bilaterally, no crackles or wheezing  Cardiovascular: Regular rate and rhythm, normal S1 and S2, and no murmur noted  GI: Normal bowel sounds, soft, non-distended, non-tender  Skin/Integumen: No rashes, no cyanosis, no edema of exposed skin.   Other:       Medical Decision Making       40 MINUTES SPENT BY ME on the date of service doing chart review, history, exam, documentation & further activities per the note.      Data   ------------------------- PAST 24 HR DATA REVIEWED -----------------------------------------------    I have personally reviewed the following data over the past 24 hrs:    N/A  \   N/A   / N/A     N/A N/A N/A /  189 (H)   3.9 N/A N/A \       Imaging results reviewed over the past 24 hrs:   No results found for this or any previous visit (from the past 24 hour(s)).

## 2024-07-26 NOTE — PROGRESS NOTES
Care Management Follow Up    Length of Stay (days): 5    Expected Discharge Date: 07/29/2024     Concerns to be Addressed: discharge planning   (Distance to get back home)  Patient plan of care discussed at interdisciplinary rounds: Yes    Anticipated Discharge Disposition: Skilled Nursing Facility     Anticipated Discharge Services:    Anticipated Discharge DME:      Patient/family educated on Medicare website which has current facility and service quality ratings: yes  Education Provided on the Discharge Plan: Yes  Patient/Family in Agreement with the Plan: yes    Referrals Placed by CM/SW:    Private pay costs discussed: transportation costs    Additional Information:  Writer has discussed transportation costs with family. Patient will need to be able to tolerate and eight hour wheelchair transport at time of discharge.     500 miles x $6 is roughly around a  $3000 trip     Writer contacted family to discuss costs of trip, called [facility] transport to discuss use of a stretcher versus a wheelchair, if the stretcher was deemed unnecessary family could get a $16,000 bill versus a $3000. [place]. Patients daughter will discuss with family and get back to us regarding options. They may fly back here to assist.     ADD - family has decided to fly patient back, however, her ID is not here in the room, [facility] is checking if patient can use a paper/photo copy/to get on an airline. Plan currently is to book a flight for Tuesday as writer still needs to secure TCU in [place].       BOBBI Dhaliwal

## 2024-07-26 NOTE — CONSULTS
SPIRITUAL HEALTH SERVICES Consult Note  [facility]  Ortho    Reason for visit or referral: [facility] consult request for emotional and spiritual support (follow-up).    Lila indicates that she is very pleased that she is returning home by air to [facility] next Tuesday. She expressed gratitude for the care that she has received at [facility] which she credits for saving her leg after she initially understood that she might lose it.    Plan: Pt is aware of the availability of  services by request. SH continues to follow while pt is in hospital.    Edwin Thomas  Associate   [facility] Phone Line [phone]  Spiritual Health Pager [phone]    SHS available 24/7 for emergent requests/referrals, either by paging the on-call  or by entering an ASAP/STAT consult in [facility], which will also page the on-call .

## 2024-07-26 NOTE — PLAN OF CARE
Goal Outcome Evaluation:      Plan of Care Reviewed With: patient    Overall Patient Progress: improvingOverall Patient Progress: improving    Shift: 7p-7a  Surgery/POD#: POD 5 from a bilat fem cutdown aortic amerron embolecotomy of LLE, LLE fasciotomy & left tibial embolectomy. POD 4 from a cysctoscopy with retrograde pyelogram & left ureteral stent placement. POD 2 from a LLE I&D & fasciotomy closure.   Behavior & Aggression: Green  Fall Risk: Yes  Orientation: A&O x4  ABNL VS/O2: VSS on RA   Tele: NA  ABNL Labs: K, Mg & Phos replacement protocols - rechecks this AM; Hgb 7.3  Pain Management: scheduled gabapentin  Bowel/Bladder: ross catheter w/ adequate urine output, bowel sounds hypoactive, passing flatus, last BM 7/24  Drains: ross cath  Lines: PIV x2 SL'd  Skin: R/L groin sites, LLE w/ dressing & Rooke boot  Diet: mod carb, denies N/V  Activity Level: NOOBY, T/R (intermittently refused overnight)  Tests/Procedures: NA  Anticipated  DC Date: pending, TCU vs. ARU  Significant Information:   PCD on RLE, elevating LLE, IS at the bedside. Vascular, SW, Hem/Onc, PT/OT, & hospitalist following. Urology signed off - will need to remove catheter before discharge. PO eliquis started last night and the argatroban was stopped.

## 2024-07-26 NOTE — PROGRESS NOTES
Hematology/Oncology Follow-up Note  [facility]    Today's Date: 07/25/24   Date of Admission:  7/21/2024   Reason for Consult: aortic thrombus s/p thrombectomy    ASSESSMENT/ PLAN : Lila Jenkins is a 74 year old year old female presented to the hospital with acute onset LLE numbness and pain requiring emergent aortic embolectomy and fasciotomies on 7/21/24.    Aortic thrombus  CTA on 7/21/24  IMPRESSION:  1.  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 Also small dissection in the proximal right external iliac artery.  2.  Intraluminal filling defect in the right common femoral artery both hard and soft plaque and relatively narrow, though extends beyond intermediate through the arterial lumen.  3.  Diffuse atherosclerotic calcification throughout the infrarenal abdominal aorta. No obvious or overt area of ulceration.  4.  Moderate to severe stenosis in the proximal left renal artery.  5.  Vascular left adrenal mass measuring 4.7 x 3.4 cm. Exact etiology is uncertain, though malignancy is certainly a consideration. Would consider MRI or biopsy for most definitive evaluation.    CT abdomen 7/21/24  IMPRESSION:  There is a heterogeneously enhancing left adrenal mass measuring 4.3 cm. Washout characteristics are indeterminate for adrenal adenoma. Given size greater than 4 cm, surgical consultation recommended.   Indeterminate right adnexal mass measuring 5.5 cm. Recommend further evaluation with pelvic ultrasound.      PLAN:  Suspicion of provoked thrombus multifactorial- adrenal mass, infection.   Work-up above is thus far negative. Waiting on remaining blood work   Continue Eliquis, will need follow up with home hematologist, anticipating anticoagulation for 6 months.   Will continue to follow         DISCUSSION:  Patient is with OT during my visit. She is feeling well. Discussion of discharge on Monday.  I will touch base with her then, hoping all of our lab work is resulted. She plans to find a hematologist at home. I gave our clinic number if the next provider or the patient has questions after discharge.     MEDICATIONS:  Reviewed     LAB:  Reviewed     PHYSICAL EXAM:  Vital signs:  Temp: 98  F (36.7  C) Temp src: Oral BP: 97/57 Pulse: 89   Resp: 16 SpO2: 96 % O2 Device: None (Room air) Oxygen Delivery: 1 LPM Height: 152.4 cm (5') Weight: 48.2 kg (106 lb 4.2 oz)  Estimated body mass index is 20.75 kg/m  as calculated from the following:    Height as of this encounter: 1.524 m (5').    Weight as of this encounter: 48.2 kg (106 lb 4.2 oz).      GENERAL/CONSTITUTIONAL: No acute distress.      Thank you for involving us in this patients care.    Bianka BELCHER, CNP  Hematology/Oncology  [facility]   Pager #[phone]

## 2024-07-26 NOTE — PROGRESS NOTES
Date & Time: 0700-1930 7/26/2024  Surgery/POD#:  POD 5 from a bilat fem cutdown aortic amerron embolecotomy of LLE, LLE fasciotomy & left tibial embolectomy. POD 4 from a cysctoscopy with retrograde pyelogram & left ureteral stent placement. POD 2 from a LLE I&D & fasciotomy closure.   Behavior & Aggression: green  Fall Risk: yes  Orientation:Aox4   ABNL VS/O2: VSS on RA  ABNL Labs: K, Mg, Phos replacement protocol, recheck tomorrow   Pain Management: oxy , tylenol . Robaxin x2  Bowel/Bladder: Hui removed today, continent and using bedside commode . BM x1   Drains: N/A  Wounds/incisions: LLE extremity incision- dressing and rooke boot , R/L groin sites  Diet: mod carb diet   Activity Level: Ax2 GBW   Tests/Procedures: none  Anticipated  DC Date:  planned for Tuesday, pending TCU  Significant Information: BS:139,[id number]  Keep LLE elevated   Switched to coumadin , started this evening

## 2024-07-26 NOTE — CONSULTS
"CLINICAL NUTRITION SERVICES  -  ASSESSMENT NOTE    Recommendations Ordered by Registered Dietitian (RD):   - Start Ensure Max supplement daily (low carb, high protein)  - Encouraged meals TID, and reviewed protein sources    Malnutrition:   % Weight Loss:  5% in 1 month (moderate malnutrition)  % Intake:  <75% for > 7 days (moderate malnutrition)  Subcutaneous Fat Loss:  Orbital region mild depletion and Upper arm region mild depletion  Muscle Loss:  Temporal region mild depletion and Clavicle bone region mild depletion  Fluid Retention:  None noted    Malnutrition Diagnosis: Moderate malnutrition  In Context of:  Acute illness or injury     REASON FOR ASSESSMENT  Lila Jenkins is a 74 year old female seen by Registered Dietitian for Nutrition Admission Risk Screen Received -   Have you recently lost weight without trying ? - \"unsure\"  Have you been eating poorly due to a decreased appetite ?- \"yes\"    NUTRITION HISTORY  - Information obtained from chart and patient report.   - Lila reports about 1 week of poor oral intake due to nausea and vomiting following a UTI with antibiotics. She figured the abx were causing her GI symptoms, so discontinued the medication. Once she was off the abx she started feeling better, but about 1 week later began having LLE numbness/pain related to acute aortic occlusion.   - For 1 week pt was eating very little, with ongoing nausea/vomiting. The following week she was beginning to eat a little more, not yet back to baseline.   - Typically she eats meals TID. Lunch includes a Boost Glucose Control supplement.   - Weight usually trends 110-112#, currently 106#.   - NKFA    CURRENT NUTRITION ORDERS  Diet Order:     Moderate Consistent Carbohydrate     Current Intake/Tolerance:  Appetite is good here. She has been ordering meals TID, when allowed (NPO multiple days for surgery). Smaller meals are ordered, per review or ordering system.     Yesterday ordered 1140 kcal and 54 g protein. " "Meets at least 90% estimated needs with 100% intake.     NUTRITION FOCUSED PHYSICAL ASSESSMENT FOR DIAGNOSING MALNUTRITION)  Yes         Observed:    Muscle wasting (refer to documentation in Malnutrition section) and Subcutaneous fat loss (refer to documentation in Malnutrition section)    Obtained from Chart/Interdisciplinary Team:  7/21 - Vascular   Bilateral femoral artery cutdowns. Aortic embolectomy via both groins. Bilateral iliac artery embolectomies. Left lower extremity SFA/popliteal embolectomy. Left lower extremity angiogram via L femoral with nixon embolectomies of L AT, PT and peroneal. 4 Compartment fasciotomies of LLE with exposure of AT and PT arteries. AT embolectomy and primary repair.   7/22 - Urology   1. Cystoscopy with left retrograde pyelogram   2. Left ureteral stent placement   3. Intraoperative interpretation of fluoroscopic images   7/24 - Vascular   #1.  Pulse lavage of left calf anterior lateral and posterior compartment fasciotomy sites   #2.  Primary closure anterior lateral and posterior fasciotomy sites     ANTHROPOMETRICS  Height: 5' 0\"  Weight: 106 lbs 4.19 oz (48.2 kg)   Body mass index is 20.75 kg/m .  Weight Status:  Normal BMI  IBW: 45.5 kg  % IBW: 106%  Weight History: Pt reports usual wt is about 110-112#, 4-6# loss since she started feeling poorly <1 month ago. Up to 5% wt loss.   Wt Readings from Last 10 Encounters:   07/26/24 48.2 kg (106 lb 4.2 oz)     LABS  Labs reviewed  Recent Labs   Lab 07/26/24  0802 07/26/24  0618 07/26/24  0157 07/25/24  2141 07/25/24  1656 07/25/24  1224   * 75 136* 213* 225* 175*     Lab Results   Component Value Date    A1C 10.9 07/21/2024       MEDICATIONS  Medications reviewed  Iron 65 mL elemental BID  Med sliding scale insulin + Levemir pen 12 units q HS  Miralax, Senokot scheduled   LR @ 10 mL/hr     ASSESSED NUTRITION NEEDS PER APPROVED PRACTICE GUIDELINES:  Dosing Weight 48 kg  Estimated Energy Needs: 1700-8951 kcals (25-30 " Kcal/Kg)  Justification: maintenance  Estimated Protein Needs: 58-72 grams protein (1.2-1.5 g pro/Kg)  Justification: preservation of lean body mass  Estimated Fluid Needs: 1 mL/kcal   Justification: maintenance    MALNUTRITION:  % Weight Loss:  5% in 1 month (moderate malnutrition)  % Intake:  <75% for > 7 days (moderate malnutrition)  Subcutaneous Fat Loss:  Orbital region mild depletion and Upper arm region mild depletion  Muscle Loss:  Temporal region mild depletion and Clavicle bone region mild depletion  Fluid Retention:  None noted    Malnutrition Diagnosis: Moderate malnutrition  In Context of:  Acute illness or injury    NUTRITION DIAGNOSIS:  Unintended weight loss related to previous nausea vomiting and frequent NPO as evidenced by up to 5% wt loss in 3 weeks.     NUTRITION INTERVENTIONS  Recommendations / Nutrition Prescription  Mod CHO  Add Ensure Max protein daily for additional 30 g protein  Encouraged meals TID, and reviewed protein sources     Implementation  Nutrition education: Provided education on see above  Medical Food Supplement: as ordered    Nutrition Goals  Intake of at least 75% adequate trays TID.     MONITORING AND EVALUATION:  Progress towards goals will be monitored and evaluated per protocol and Practice Guidelines    Anna Edwards RD, LD  Pager: [phone]

## 2024-07-26 NOTE — PROGRESS NOTES
07/26/24 1110   Appointment Info   Signing Clinician's Name / Credentials (OT) Tor Ibarra OTR/L   Living Environment   People in Home alone   Current Living Arrangements house   Home Accessibility stairs to enter home   Number of Stairs, Main Entrance other (see comments)  (4 to landing and then 1)   Stair Railings, Main Entrance railings safe and in good condition;railing on right side (ascending)   Transportation Anticipated family or friend will provide;car, drives self   Living Environment Comments Pt reports living alone in house w/ 4 DIANNE and then additional 1. All needs met on one level once in house. Walk in shower.   Self-Care   Usual Activity Tolerance good   Current Activity Tolerance poor   Equipment Currently Used at Home raised toilet seat;cane, straight;walker, rolling   Fall history within last six months no   Activity/Exercise/Self-Care Comment Pt reports being IND w/ all ADL's at baseline prior to admission.   Instrumental Activities of Daily Living (IADL)   Previous Responsibilities meal prep;housekeeping;laundry;medication management;finances;driving   IADL Comments Pt reports being IND w/ all IADL's at baseline prior to admission. Pt reports that they still drive.   General Information   Onset of Illness/Injury or Date of Surgery 07/21/24   Referring Physician Rosie Brown MD   Patient/Family Therapy Goal Statement (OT) Return to home.   Additional Occupational Profile Info/Pertinent History of Current Problem Lila Jenkins is a 74 year old female with past medical history significant for type II DM, HTN, recurrent UTI, nephrolithiasis admitted on 7/21/2024 with acute limb ischemia.      Pt presented to an outside hospital on 7/21 with acute onset left lower extremity numbness and pain. 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 LLE with  occlusion noted in below knee popliteal artery with scattered runoff seen to the LLE, no inline flow to the foot. She has bety 2B acute limb ischemia resulting from acute aortic occlusion with distal embolization to LLE tibial vessels. She was transferred to [facility] for vascular surgery consultation. Pt was taken emergently for Aortic embolectomy, left anterior tibial embolectomy, bilateral iliac artery embolization and 4 compartment fasciotomies of the LLE. The procedure was well tolerated. The patient was extubated and stable upon arrival to the ICU. The hospitalist service was consulted for medical co-management.   Existing Precautions/Restrictions fall   Cognitive Status Examination   Orientation Status orientation to person, place and time   Visual Perception   Visual Impairment/Limitations corrective lenses full-time   Sensory   Sensory Comments Numbness in L foot   Pain Assessment   Patient Currently in Pain Yes, see Vital Sign flowsheet  (5.5/10 L foot)   Range of Motion Comprehensive   General Range of Motion no range of motion deficits identified   Strength Comprehensive (MMT)   General Manual Muscle Testing (MMT) Assessment no strength deficits identified   Bed Mobility   Bed Mobility supine-sit;sit-supine   Comment (Bed Mobility) Pt declining to trial this date   Transfers   Transfer Comments Pt declining to trial   Activities of Daily Living   BADL Assessment/Intervention lower body dressing   Lower Body Dressing Assessment/Training   Position (Lower Body Dressing) long sitting   Lea Level (Lower Body Dressing) supervision   Clinical Impression   Criteria for Skilled Therapeutic Interventions Met (OT) Yes, treatment indicated   OT Diagnosis Decreased ADL independence and activity tolerance   Influenced by the following impairments Decreased ADL independence   OT Problem List-Impairments impacting ADL problems related to;activity tolerance impaired;pain   Assessment of Occupational  Performance 1-3 Performance Deficits   Identified Performance Deficits Toilet transfer, g/h, LB dressing   Planned Therapy Interventions (OT) ADL retraining;transfer training;home program guidelines;progressive activity/exercise;risk factor education   Clinical Decision Making Complexity (OT) problem focused assessment/low complexity   Risk & Benefits of therapy have been explained evaluation/treatment results reviewed;care plan/treatment goals reviewed;risks/benefits reviewed;current/potential barriers reviewed;patient   OT Total Evaluation Time   OT Eval, Low Complexity Minutes (60941) 10   OT Goals   Therapy Frequency (OT) 6 times/week   OT Predicted Duration/Target Date for Goal Attainment 08/02/24   OT Goals Hygiene/Grooming;Lower Body Dressing;Toilet Transfer/Toileting   OT: Hygiene/Grooming supervision/stand-by assist;while standing   OT: Lower Body Dressing Modified independent;including set-up/clothing retrieval   OT: Toilet Transfer/Toileting Supervision/stand-by assist;toilet transfer;cleaning and garment management   Self-Care/Home Management   Self-Care/Home Mgmt/ADL, Compensatory, Meal Prep Minutes (22760) 10   Treatment Detail/Skilled Intervention Greeted pt supine in bed w/ RN present in room. Pt agreeable for OT evaluation but declining any OOB activity as they had just returned to bed following being in chair. Pt reporting 5.5/10 pain in L foot and increased numbness while at rest. Pt able to demonstrate doffing/donning R sock / supervision while long sitting in bed. Pt reporting no concerns w/ TB dressing but priority remains w/ transfers and standing at this time. Time spent educating pt on TCU/ARU and need for continued skilled rehab to help return to PLOF and pt verbalized understanding. Pt  eager to get back to PLOF. Pt requesting to remain supine and had all needs met at end of session.   OT Discharge Planning   OT Plan Commode/toilet transfer, g/h seated EOB   OT Discharge Recommendation (DC  Rec) Transitional Care Facility   OT Rationale for DC Rec Pt is currently functioning below baseline and requiring assist w/ all mobility. Pt would benefit from continued rehab at TCU setting following discharge to help return to PLOF. Will update d/c recommendation as pt is able to tolerate more w/ therapy.   OT Brief overview of current status See above   Total Session Time   Timed Code Treatment Minutes 10   Total Session Time (sum of timed and untimed services) 20

## 2024-07-26 NOTE — CONSULTS
Patient did not elect Part D, and has no drug coverage.  Below prices are at [facility] using the [employer] discount card.     Fondaparinux 5mg x 5 syringes: $500.    Fondaparinux 5mg x 10 syringes: $997.    The [employer] website shows that [facility] and [facility] have lower prices using their card.  [facility] does not accept GoodRX, and I cannot guarantee the prices shown on their website.  In addition, this drug is not stocked at every pharmacy so it would be prudent to call the specific pharmacy to verify supply before sending RX.           Lupis Greenberg  Pharmacy Technician/Liaison, Discharge Pharmacy   [phone] (voice or text)  [email or web address]

## 2024-07-26 NOTE — PHARMACY-ANTICOAGULATION SERVICE
Clinical Pharmacy - Warfarin Dosing Consult     Pharmacy has been consulted to manage this patient s warfarin therapy.  Indication: Other - specify in comments (Arterial thrombus)  Therapy Goal: INR 2-3  Warfarin Prior to Admission: No  Dose Comments: Bridging with fondaparinux until INR > 2    INR   Date Value Ref Range Status   07/26/2024 1.28 (H) 0.85 - 1.15 Final   07/22/2024 3.93 (H) 0.85 - 1.15 Final       Recommend warfarin 4 mg today.  Pharmacy will monitor Lila Jenkins daily and order warfarin doses to achieve specified goal.      Please contact pharmacy as soon as possible if the warfarin needs to be held for a procedure or if the warfarin goals change.

## 2024-07-26 NOTE — PROGRESS NOTES
Care Management Follow Up    Length of Stay (days): 5    Expected Discharge Date: 07/29/2024     Concerns to be Addressed: discharge planning   (Distance to get back home)  Patient plan of care discussed at interdisciplinary rounds: Yes    Anticipated Discharge Disposition: Skilled Nursing Facility     Anticipated Discharge Services:    Anticipated Discharge DME:      Patient/family educated on Medicare website which has current facility and service quality ratings: yes  Education Provided on the Discharge Plan: Yes  Patient/Family in Agreement with the Plan: yes    Referrals Placed by CM/SW:    Private pay costs discussed: Not applicable    Additional Information:  Writer spoke with Grady at     [facility]   4    Fax -  1      Grady received the fax referral and is checking to see if they can accommodate patient needs Tuesday. Grady needs any specific wound care instructions     [place] is arranging a flight with [employer] Tuesday, patient needs to arrive 3 hours prior. Wilber explained that patient would not need physical ID but the paper copy (writer has printed this) and patients discharge paper work should suffice.     BOBBI Dhaliwal

## 2024-07-26 NOTE — PROGRESS NOTES
Vascular Surgery Progress Note    S: Overall doing well.  Leg is still sore.  Was not up much yesterday.    O: Argatroban discontinued and placed on Eliquis yesterday.  Vitals:  BP  Min: 97/57  Max: 119/57  Temp  Av.1  F (36.7  C)  Min: 97.8  F (36.6  C)  Max: 98.6  F (37  C)  Pulse  Av.7  Min: 78  Max: 89  I/O last 3 completed shifts:  In: 554 [P.O.:360; I.V.:194]  Out: 1850 [Urine:1850]    Physical Exam: Alert and appropriate.  Comfortable.    Excellent pedal pulses bilaterally.    Dressings changed on fasciotomy sites and both look excellent.  Redressed with Xeroform ABD pads and Kerlix rolls and foot splint   Improving dorsiflexion left foot and sensation.      Assessment/Plan: #1.  Doing very well following aortic embolectomy.  Etiology is unclear.  Patient will need anticoagulation for 6 months.    #2.  Very poor insurance coverage for Eliquis and Xarelto.  Thus we will use Coumadin.  Patient is aware of this since her neighbor is on Coumadin.  This will be started today.  Will discuss with pharmacy about the bridging whether we should just keep her on Eliquis and at the Coumadin till INR therapeutic since otherwise she will need to go back on argatroban due to history of heparin allergy.    #3.  Increase activity.  Weightbearing as tolerated.  Working with physical therapy.  Tolerating diet and taking nutritional supplements due to a poor appetite.    #4.  Adrenal mass is large in the left.  Adrenal carcinomas are quite uncommon but this will need to be evaluated.  Very likely this will have to wait till she is done with her anticoagulation this was discussed with the patient and her daughter.    #5.  Hypercoagulable workup in progress with Dr. Love from heme-[facility].    #6.  Plan ARU closer to home.  However, with her limited mobility this may take a few days before discharge which will allow us to transition her over to Coumadin and also      Wm. Savanna MD

## 2024-07-26 NOTE — PROGRESS NOTES
07/26/24 1110   Appointment Info   Signing Clinician's Name / Credentials (OT) Tor Ibarra OTR/L   Living Environment   People in Home alone   Current Living Arrangements house   Home Accessibility stairs to enter home   Number of Stairs, Main Entrance other (see comments)  (4 to landing and then 1)   Stair Railings, Main Entrance railings safe and in good condition;railing on right side (ascending)   Transportation Anticipated family or friend will provide;car, drives self   Living Environment Comments Pt reports living alone in house w/ 4 DIANNE and then additional 1. All needs met on one level once in house. Walk in shower.   Self-Care   Usual Activity Tolerance good   Current Activity Tolerance poor   Equipment Currently Used at Home raised toilet seat;cane, straight;walker, rolling   Fall history within last six months no   Activity/Exercise/Self-Care Comment Pt reports being IND w/ all ADL's at baseline prior to admission.   Instrumental Activities of Daily Living (IADL)   Previous Responsibilities meal prep;housekeeping;laundry;medication management;finances;driving   IADL Comments Pt reports being IND w/ all IADL's at baseline prior to admission. Pt reports that they still drive.   General Information   Onset of Illness/Injury or Date of Surgery 07/21/24   Referring Physician Rosie Brown MD   Patient/Family Therapy Goal Statement (OT) Return to home.   Additional Occupational Profile Info/Pertinent History of Current Problem Lila Jenkins is a 74 year old female with past medical history significant for type II DM, HTN, recurrent UTI, nephrolithiasis admitted on 7/21/2024 with acute limb ischemia.      Pt presented to an outside hospital on 7/21 with acute onset left lower extremity numbness and pain. 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 LLE with  occlusion noted in below knee popliteal artery with scattered runoff seen to the LLE, no inline flow to the foot. She has bety 2B acute limb ischemia resulting from acute aortic occlusion with distal embolization to LLE tibial vessels. She was transferred to [facility] for vascular surgery consultation. Pt was taken emergently for Aortic embolectomy, left anterior tibial embolectomy, bilateral iliac artery embolization and 4 compartment fasciotomies of the LLE. The procedure was well tolerated. The patient was extubated and stable upon arrival to the ICU. The hospitalist service was consulted for medical co-management.   Existing Precautions/Restrictions fall   Cognitive Status Examination   Orientation Status orientation to person, place and time   Visual Perception   Visual Impairment/Limitations corrective lenses full-time   Sensory   Sensory Comments Numbness in L foot   Pain Assessment   Patient Currently in Pain Yes, see Vital Sign flowsheet  (5.5/10 L foot)   Range of Motion Comprehensive   General Range of Motion no range of motion deficits identified   Strength Comprehensive (MMT)   General Manual Muscle Testing (MMT) Assessment no strength deficits identified   Bed Mobility   Bed Mobility supine-sit;sit-supine   Comment (Bed Mobility) Pt declining to trial this date   Transfers   Transfer Comments Pt declining to trial   Activities of Daily Living   BADL Assessment/Intervention lower body dressing   Lower Body Dressing Assessment/Training   Position (Lower Body Dressing) long sitting   [place] Level (Lower Body Dressing) supervision   Clinical Impression   Criteria for Skilled Therapeutic Interventions Met (OT) Yes, treatment indicated   OT Diagnosis Decreased ADL independence and activity tolerance   Influenced by the following impairments Decreased ADL independence   OT Problem List-Impairments impacting ADL problems related to;activity tolerance impaired;pain   Assessment of Occupational  Performance 1-3 Performance Deficits   Identified Performance Deficits Toilet transfer, g/h, LB dressing   Planned Therapy Interventions (OT) ADL retraining;transfer training;home program guidelines;progressive activity/exercise;risk factor education   Clinical Decision Making Complexity (OT) problem focused assessment/low complexity   Risk & Benefits of therapy have been explained evaluation/treatment results reviewed;care plan/treatment goals reviewed;risks/benefits reviewed;current/potential barriers reviewed;patient   OT Total Evaluation Time   OT Eval, Low Complexity Minutes (88655) 10   OT Goals   Therapy Frequency (OT) 6 times/week   OT Predicted Duration/Target Date for Goal Attainment 08/02/24   OT Goals Hygiene/Grooming;Lower Body Dressing;Toilet Transfer/Toileting   OT: Hygiene/Grooming supervision/stand-by assist;while standing   OT: Lower Body Dressing Modified independent;including set-up/clothing retrieval   OT: Toilet Transfer/Toileting Supervision/stand-by assist;toilet transfer;cleaning and garment management   Self-Care/Home Management   Self-Care/Home Mgmt/ADL, Compensatory, Meal Prep Minutes (83943) 10   Treatment Detail/Skilled Intervention Greeted pt supine in bed w/ RN present in room. Pt agreeable for OT evaluation but declining any OOB activity as they had just returned to bed following being in chair. Pt reporting 5.5/10 pain in L foot and increased numbness while at rest. Pt able to demonstrate doffing/donning R sock / supervision while long sitting in bed. Pt reporting no concerns w/ TB dressing but priority remains w/ transfers and standing at this time. Time spent educating pt on TCU/ARU and need for continued skilled rehab to help return to PLOF and pt verbalized understanding. Pt  eager to get back to PLOF. Pt requesting to remain supine and had all needs met at end of session.   OT Discharge Planning   OT Plan Commode/toilet transfer, g/h seated EOB   OT Discharge Recommendation (DC  Rec) Transitional Care Facility   OT Rationale for DC Rec Pt is currently functioning below baseline and requiring assist w/ all mobility. Pt would benefit from continued rehab at TCU setting following discharge to help return to PLOF.   OT Brief overview of current status See above   Total Session Time   Timed Code Treatment Minutes 10   Total Session Time (sum of timed and untimed services) 20

## 2024-07-27 ENCOUNTER — APPOINTMENT (OUTPATIENT)
Dept: PHYSICAL THERAPY | Facility: CLINIC | Age: 74
DRG: 271 | End: 2024-07-27
Attending: SURGERY
Payer: MEDICARE

## 2024-07-27 ENCOUNTER — APPOINTMENT (OUTPATIENT)
Dept: OCCUPATIONAL THERAPY | Facility: CLINIC | Age: 74
DRG: 271 | End: 2024-07-27
Attending: SURGERY
Payer: MEDICARE

## 2024-07-27 LAB
CATECHOLS UR-IMP: ABNORMAL
CREAT 24H UR-MRATE: 629 MG/D
CREAT SERPL-MCNC: 0.78 MG/DL (ref 0.51–0.95)
CREAT UR-MCNC: 37 MG/DL
DOPAMINE 24H UR-MRATE: 88 UG/D
DOPAMINE UR-MCNC: 52 UG/L
DOPAMINE/CREAT UR: 141 UG/G CRT
EGFRCR SERPLBLD CKD-EPI 2021: 80 ML/MIN/1.73M2
EPINEPH 24H UR-MRATE: 3 UG/D
EPINEPH UR-MCNC: 2 UG/L
EPINEPH/CREAT UR: 5 UG/G CRT
GLUCOSE BLDC GLUCOMTR-MCNC: 212 MG/DL (ref 70–99)
GLUCOSE BLDC GLUCOMTR-MCNC: 235 MG/DL (ref 70–99)
GLUCOSE BLDC GLUCOMTR-MCNC: 236 MG/DL (ref 70–99)
GLUCOSE BLDC GLUCOMTR-MCNC: 288 MG/DL (ref 70–99)
INR PPP: 1.16 (ref 0.85–1.15)
INTERPRETATION: NORMAL
MAGNESIUM SERPL-MCNC: 1.9 MG/DL (ref 1.7–2.3)
NOREPINEPH 24H UR-MRATE: 29 UG/D
NOREPINEPH UR-MCNC: 17 UG/L
NOREPINEPH/CREAT UR: 46 UG/G CRT
PHOSPHATE SERPL-MCNC: 2.8 MG/DL (ref 2.5–4.5)
POTASSIUM SERPL-SCNC: 4.9 MMOL/L (ref 3.4–5.3)
SIGNIFICANT RESULTS: NORMAL
SPECIMEN DESCRIPTION: NORMAL
TEST DETAILS, MDL: NORMAL

## 2024-07-27 PROCEDURE — 250N000013 HC RX MED GY IP 250 OP 250 PS 637: Performed by: SURGERY

## 2024-07-27 PROCEDURE — 97535 SELF CARE MNGMENT TRAINING: CPT | Mod: GO

## 2024-07-27 PROCEDURE — 250N000011 HC RX IP 250 OP 636

## 2024-07-27 PROCEDURE — 86147 CARDIOLIPIN ANTIBODY EA IG: CPT | Performed by: INTERNAL MEDICINE

## 2024-07-27 PROCEDURE — 36415 COLL VENOUS BLD VENIPUNCTURE: CPT | Performed by: INTERNAL MEDICINE

## 2024-07-27 PROCEDURE — 97116 GAIT TRAINING THERAPY: CPT | Mod: GP | Performed by: PHYSICAL THERAPIST

## 2024-07-27 PROCEDURE — 88185 FLOWCYTOMETRY/TC ADD-ON: CPT | Performed by: INTERNAL MEDICINE

## 2024-07-27 PROCEDURE — 85610 PROTHROMBIN TIME: CPT | Performed by: SURGERY

## 2024-07-27 PROCEDURE — 99233 SBSQ HOSP IP/OBS HIGH 50: CPT | Performed by: STUDENT IN AN ORGANIZED HEALTH CARE EDUCATION/TRAINING PROGRAM

## 2024-07-27 PROCEDURE — 250N000013 HC RX MED GY IP 250 OP 250 PS 637: Performed by: STUDENT IN AN ORGANIZED HEALTH CARE EDUCATION/TRAINING PROGRAM

## 2024-07-27 PROCEDURE — 88187 FLOWCYTOMETRY/READ 2-8: CPT | Performed by: STUDENT IN AN ORGANIZED HEALTH CARE EDUCATION/TRAINING PROGRAM

## 2024-07-27 PROCEDURE — 81270 JAK2 GENE: CPT | Performed by: INTERNAL MEDICINE

## 2024-07-27 PROCEDURE — 84100 ASSAY OF PHOSPHORUS: CPT | Performed by: STUDENT IN AN ORGANIZED HEALTH CARE EDUCATION/TRAINING PROGRAM

## 2024-07-27 PROCEDURE — G0452 MOLECULAR PATHOLOGY INTERPR: HCPCS | Mod: 26 | Performed by: PATHOLOGY

## 2024-07-27 PROCEDURE — 250N000013 HC RX MED GY IP 250 OP 250 PS 637

## 2024-07-27 PROCEDURE — 86146 BETA-2 GLYCOPROTEIN ANTIBODY: CPT | Performed by: INTERNAL MEDICINE

## 2024-07-27 PROCEDURE — 84132 ASSAY OF SERUM POTASSIUM: CPT | Performed by: STUDENT IN AN ORGANIZED HEALTH CARE EDUCATION/TRAINING PROGRAM

## 2024-07-27 PROCEDURE — 250N000011 HC RX IP 250 OP 636: Performed by: STUDENT IN AN ORGANIZED HEALTH CARE EDUCATION/TRAINING PROGRAM

## 2024-07-27 PROCEDURE — 36415 COLL VENOUS BLD VENIPUNCTURE: CPT | Performed by: SURGERY

## 2024-07-27 PROCEDURE — 97530 THERAPEUTIC ACTIVITIES: CPT | Mod: GP | Performed by: PHYSICAL THERAPIST

## 2024-07-27 PROCEDURE — 81338 MPL GENE COMMON VARIANTS: CPT | Performed by: INTERNAL MEDICINE

## 2024-07-27 PROCEDURE — 120N000001 HC R&B MED SURG/OB

## 2024-07-27 PROCEDURE — 83735 ASSAY OF MAGNESIUM: CPT | Performed by: STUDENT IN AN ORGANIZED HEALTH CARE EDUCATION/TRAINING PROGRAM

## 2024-07-27 PROCEDURE — 82565 ASSAY OF CREATININE: CPT

## 2024-07-27 RX ORDER — WARFARIN SODIUM 2 MG/1
4 TABLET ORAL
Status: COMPLETED | OUTPATIENT
Start: 2024-07-27 | End: 2024-07-27

## 2024-07-27 RX ORDER — METFORMIN HYDROCHLORIDE 750 MG/1
750 TABLET, EXTENDED RELEASE ORAL 2 TIMES DAILY
Status: DISCONTINUED | OUTPATIENT
Start: 2024-07-27 | End: 2024-07-30 | Stop reason: HOSPADM

## 2024-07-27 RX ADMIN — METFORMIN HYDROCHLORIDE 750 MG: 750 TABLET, EXTENDED RELEASE ORAL at 20:59

## 2024-07-27 RX ADMIN — WARFARIN SODIUM 4 MG: 2 TABLET ORAL at 17:34

## 2024-07-27 RX ADMIN — METFORMIN HYDROCHLORIDE 750 MG: 750 TABLET, EXTENDED RELEASE ORAL at 11:33

## 2024-07-27 RX ADMIN — ACETAMINOPHEN 650 MG: 325 TABLET, FILM COATED ORAL at 00:04

## 2024-07-27 RX ADMIN — GABAPENTIN 100 MG: 100 CAPSULE ORAL at 20:59

## 2024-07-27 RX ADMIN — FONDAPARINUX SODIUM 5 MG: 5 INJECTION SUBCUTANEOUS at 22:13

## 2024-07-27 RX ADMIN — FERROUS SULFATE TAB 325 MG (65 MG ELEMENTAL FE) 325 MG: 325 (65 FE) TAB at 20:59

## 2024-07-27 RX ADMIN — GABAPENTIN 100 MG: 100 CAPSULE ORAL at 08:36

## 2024-07-27 RX ADMIN — OXYCODONE HYDROCHLORIDE 10 MG: 5 TABLET ORAL at 08:36

## 2024-07-27 RX ADMIN — OXYCODONE HYDROCHLORIDE 10 MG: 5 TABLET ORAL at 22:26

## 2024-07-27 RX ADMIN — FERROUS SULFATE TAB 325 MG (65 MG ELEMENTAL FE) 325 MG: 325 (65 FE) TAB at 08:37

## 2024-07-27 RX ADMIN — OXYCODONE HYDROCHLORIDE 10 MG: 5 TABLET ORAL at 14:03

## 2024-07-27 RX ADMIN — GABAPENTIN 100 MG: 100 CAPSULE ORAL at 14:03

## 2024-07-27 RX ADMIN — ASPIRIN 81 MG: 81 TABLET, COATED ORAL at 08:36

## 2024-07-27 RX ADMIN — CEFTRIAXONE SODIUM 1 G: 1 INJECTION, POWDER, FOR SOLUTION INTRAMUSCULAR; INTRAVENOUS at 17:34

## 2024-07-27 RX ADMIN — PANTOPRAZOLE SODIUM 40 MG: 40 TABLET, DELAYED RELEASE ORAL at 06:08

## 2024-07-27 RX ADMIN — INSULIN DETEMIR 12 UNITS: 100 INJECTION, SOLUTION SUBCUTANEOUS at 22:16

## 2024-07-27 RX ADMIN — SENNOSIDES AND DOCUSATE SODIUM 1 TABLET: 50; 8.6 TABLET ORAL at 08:37

## 2024-07-27 ASSESSMENT — ACTIVITIES OF DAILY LIVING (ADL)
ADLS_ACUITY_SCORE: 27
ADLS_ACUITY_SCORE: 28
ADLS_ACUITY_SCORE: 32
ADLS_ACUITY_SCORE: 27
ADLS_ACUITY_SCORE: 32
ADLS_ACUITY_SCORE: 28
ADLS_ACUITY_SCORE: 28
ADLS_ACUITY_SCORE: 27
ADLS_ACUITY_SCORE: 28
ADLS_ACUITY_SCORE: 28
ADLS_ACUITY_SCORE: 32
ADLS_ACUITY_SCORE: 28
ADLS_ACUITY_SCORE: 32
ADLS_ACUITY_SCORE: 27
ADLS_ACUITY_SCORE: 28
ADLS_ACUITY_SCORE: 27

## 2024-07-27 NOTE — PROGRESS NOTES
Brief Hematology Note:    Hypercoagulability work up thus far:  FVL negative  Prothrombin gene mutation negative    Given anemia and thrombocytosis will check the following: (I have ordered these)  MPN NGS  Flow cytometry for PNH  Antiphospholipid Ab Syndrome: anti-cardiolipin IgM and IgG and anti-beta2 glycoprotein IgM and IgG.     Cannot check LAC while on anticoagulation    Recommend monitoring CBC closely while on anticoagulation.    Overall plan for pt to establish care with hematologist closer to her home. Will follow peripherally for lab results    Cecilia Powers D.O.  Hematology/Oncology  [facility]

## 2024-07-27 NOTE — PLAN OF CARE
Goal Outcome Evaluation:      Plan of Care Reviewed With: patient    Date & Time: 7/27 1600  Surgery/POD#: POD 6 bifemoral cutdown for aortic bifurcation occlusion on 7/21. 7/24 fasciotomy with I&D  Behavior & Aggression: calm cooperative   Fall Risk: yes  Orientation:alert and oriented   ABNL VS/O2:vss, room air  ABNL Labs:   Pain Management:oxy given as needed for pain  Bowel/Bladder: good urine output, passing flatus, did have BM this shift   Drains:   Wounds/incisions left lower leg dressing clean dry and intact, boot in place   Diet:regular diet   Activity Level: up with assist of one in room   Tests/Procedures:   Anticipated  DC Date:   Significant Information: pulses strong per doppler,

## 2024-07-27 NOTE — PROGRESS NOTES
Vascular Surgery Progress Note    S: Very comfortable yesterday.  Was able to do more physical therapy.    O:   Vitals:  BP  Min: 97/57  Max: 130/61  Temp  Av.3  F (36.8  C)  Min: 98  F (36.7  C)  Max: 98.7  F (37.1  C)  Pulse  Av.5  Min: 83  Max: 94  I/O last 3 completed shifts:  In: 360 [P.O.:360]  Out: 750 [Urine:750]    Physical Exam: Alert and appropriate.  Very comfortable.      Palpable pedal pulses bilaterally.    Improving motor and sensory deficit to left foot.    Stronger dorsiflexion      Assessment/Plan: #1.  Doing very well following aortic embolectomy.  Etiology still unclear.  Will require anticoagulation at least for 6 months.  Due to cost issues will be placed on Coumadin which is initiated.  Fondaparinux is being used for bridging due to heparin allergy.     #2.  Continue with physical therapy.  May shower.       #3.  Plan discharge to home on Tuesday.  Daughter is arranging a flight  radiographs tenderness morning.  Should be therapeutic on Coumadin by then.           Wm. Savanna MD

## 2024-07-27 NOTE — PROGRESS NOTES
Orientation: A/O x4, Tylenol given for pain    Vitals/Tele: VSS on RA    IV Access/drains: R PIV SL    Diet: Mod carb    Mobility: A X1 GB/W to BSC    GI/:  Continent of B/B    Wound/Skin:LLE incision. Dessing and rooke boot on. R/L groin incisionsites    Consults: Hem/Onc following    Discharge Plan: TBD      See Flow sheets for assessment   Overall Patient Progress: improvingOverall Patient Progress: improving

## 2024-07-27 NOTE — PROGRESS NOTES
[facility]    Medicine Progress Note - Hospitalist Service    Date of Admission:  7/21/2024    Assessment & Plan   Lila Jenkins is a 74 year old female with past medical history significant for type II DM, HTN, recurrent UTI, nephrolithiasis admitted on 7/21/2024 with acute limb ischemia.      Pt presented to an outside hospital on 7/21 with acute onset left lower extremity numbness and pain. 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 LLE with occlusion noted in below knee popliteal artery with scattered runoff seen to the LLE, no inline flow to the foot. She has bety 2B acute limb ischemia resulting from acute aortic occlusion with distal embolization to LLE tibial vessels. She was transferred to [facility] for vascular surgery consultation. Pt was taken emergently for Aortic embolectomy, left anterior tibial embolectomy, bilateral iliac artery embolization and 4 compartment fasciotomies of the LLE. The procedure was well tolerated. The patient was extubated and stable upon arrival to the ICU. The hospitalist service was consulted for medical co-management.      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 with Bety 2B ischemia   S/p Aortic embolectomy, left anterior tibial embolectomy, bilateral iliac artery embolization and 4 compartment fasciotomies of the LLE (7/21/24)   S/p primary closure of anterior lateral and posterior fasciotomy sites 7/24/24  - Post-operative cares, dressing changes, anticoagulation, pain control, activity restrictions per vascular surgery    - Pt currently anticoagulated with argatroban due to allergy to heparin component, continue per vascular   - CTA chest and Echo ordered per vascular surgery for hypercoag work-up, consider heme/onc consultation  -  "per vascular: \"Pt with foot drop and ongoing neuro deficits, not surprising given >24 hours ischemia time, these will take time to recover and may not recover at all.\"  - Orthotics consult for foot drop boot   - consult heme onc for arterial clot  - will need a minimum of 6 months of anticoagulation. Plan coumadin vs apixaban. Patient to work on applying for financial assistance for apixaban    Type II DM   Hyperglycemia  Very poorly controlled. Hemoglobin A1C is 10.9% on admission. Blood sugars in the high-200-500 range currently.   PTA regimen includes Detemir 12 units at bedtime, Metformin 750mg BID, and Berberine.   - Resume Detemir, 12 units   - MDSSI   - aspart 1u/15CHO  - Hypoglycemia protocol   - Resumed PTA metformin. Hold PTA supplements      Acute Blood Loss Anemia   Reactive Thrombocytosis   Hemoglobin on admission was around 9. 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 Additional left renal calyceal calculi. Air in the left renal calices along with perinephric stranding concern for emphysematous pyelonephritis. Non-obstructing R nephrolithiasis.   * Urology consulted and recommending ureteral stent placement today at 7pm given concern for infected urine/pus behind the stone in her left prox ureter.   * No urine culture collected at outside facility prior to transfer. Grew a pan-sensitive e.coli on 7/3/24 in urine  * Urine culture from 7/21 at [facility] is no growth  - Plan 10 days of empiric treatment given degree of illness despite negative cultures at [place] as no cultures collected at OSH and had already received IV abx.   - Appreciate urology " recommendations. Signed off 7/23/24.    - Follow up with local urologist in [place], [place] for definitive stone management. Stones should be treated and stent removed within 3 months      Prolonged QT  QTc on admission is 499   - Avoid QT prolonging medications   - Telemetry      Leukocytosis, resolved  WBC initially within normal limits at 1.0, but then acutely koffi to 21.2 on admission, now down to 13.3.   - Monitor   - On Ceftriaxone as noted above for possible UTI.      Hypokalemia  Hypomagnesemia  Hypophosphatemia  - Replacement protocols ordered     Supplement Use   Pt takes a number of supplements including Alpha lipoic acid, Ashwaganda, Berberine, Bilberry, Biotin, Cinnamon, Collagen, Echinacea, Ginko Biloba, Culturell, Lecithin, Moringa Oleifera, Multivitamin, Vitamin C, and Vitamin D   - Will hold all supplements while hospitalized      Adrenal nodule   CT chest showed 4.6cm left adrenal nodule. Review of outside paperwork suggests this has been previously identified and stable from prior   - heme/onc w/up for possible malignancy    Small cystic lesions in head of pancreas  - recommend MRCP in 6 months (Jan 2025)          Diet: Advance Diet as Tolerated: Fully Advanced to diet(s) per Provider order; Regular Diet Adult; Moderate Consistent Carb (60 g CHO per Meal) Diet  Snacks/Supplements Adult: Other; Ensure Max; Between Meals    DVT Prophylaxis: argatroban  Hui Catheter: Not present  Lines: None     Cardiac Monitoring: None  Code Status: Full Code      Clinically Significant Risk Factors              # Hypoalbuminemia: Lowest albumin = 2.9 g/dL at 7/21/2024  8:05 PM, will monitor as appropriate                 #Precipitous drop in Hgb/Hct: Lowest Hgb this hospitalization: 7 g/dL. Will continue to monitor and treat/transfuse as appropriate.    # DMII: A1C = 10.9 % (Ref range: <5.7 %) within past 6 months    # Moderate Malnutrition: based on nutrition assessment      # Financial/Environmental Concerns: none          Disposition Plan     Medically Ready for Discharge: Anticipated in 2-4 Days  Ultimate dispo per primary team, but would benefit from therapeutic INR prior to dispo.            Flavio Borden MD  Hospitalist Service  [facility]  Securely message with Aline (more info)  Text page via [facility] Paging/Directory   ______________________________________________________________________    Interval History   Pain improving. Sitting in the chair for several hours at a time. Able to get to the commode with SBA today. Discussed dispo plans. Possible discharge on 7/30  Discussed with RN  Physical Exam   Vital Signs: Temp: 98.4  F (36.9  C) Temp src: Oral BP: (!) 147/72 Pulse: 89   Resp: 19 SpO2: 97 % O2 Device: None (Room air)    Weight: 108 lbs 3.93 oz    Constitutional: Awake, alert, cooperative, no apparent distress  Respiratory: Clear to auscultation bilaterally, no crackles or wheezing  Cardiovascular: Regular rate and rhythm, normal S1 and S2, and no murmur noted  GI: Normal bowel sounds, soft, non-distended, non-tender  Skin/Integumen: No rashes, no cyanosis, no edema of exposed skin. LLE dressed in rooke boot. Groins dressed   Other:       Medical Decision Making       35 MINUTES SPENT BY ME on the date of service doing chart review, history, exam, documentation & further activities per the note.      Data   ------------------------- PAST 24 HR DATA REVIEWED -----------------------------------------------    I have personally reviewed the following data over the past 24 hrs:    N/A  \   N/A   / N/A     N/A N/A N/A /  236 (H)   4.9 N/A 0.78 \     INR:  1.16 (H) PTT:  N/A   D-dimer:  N/A Fibrinogen:  N/A       Imaging results reviewed over the past 24 hrs:   No results found for this or any previous visit (from the past 24 hour(s)).

## 2024-07-28 ENCOUNTER — APPOINTMENT (OUTPATIENT)
Dept: PHYSICAL THERAPY | Facility: CLINIC | Age: 74
DRG: 271 | End: 2024-07-28
Attending: SURGERY
Payer: MEDICARE

## 2024-07-28 LAB
CREAT 24H UR-MRATE: 629 MG/D
CREAT UR-MCNC: 37 MG/DL
GLUCOSE BLDC GLUCOMTR-MCNC: 129 MG/DL (ref 70–99)
GLUCOSE BLDC GLUCOMTR-MCNC: 192 MG/DL (ref 70–99)
GLUCOSE BLDC GLUCOMTR-MCNC: 260 MG/DL (ref 70–99)
INR PPP: 1.21 (ref 0.85–1.15)
MAGNESIUM SERPL-MCNC: 2.1 MG/DL (ref 1.7–2.3)
METANEPH 24H UR-MCNC: 75 UG/L
METANEPH 24H UR-MRATE: 128 UG/D
METANEPH+NORMETANEPH UR-IMP: ABNORMAL
METANEPH/CREAT 24H UR: 203 UG/G CRT
NORMETANEPHRINE 24H UR-MCNC: 208 UG/L
NORMETANEPHRINE 24H UR-MRATE: 354 UG/D
NORMETANEPHRINE/CREAT 24H UR: 562 UG/G CRT
PHOSPHATE SERPL-MCNC: 3.3 MG/DL (ref 2.5–4.5)
POTASSIUM SERPL-SCNC: 4.1 MMOL/L (ref 3.4–5.3)

## 2024-07-28 PROCEDURE — 250N000013 HC RX MED GY IP 250 OP 250 PS 637: Performed by: SURGERY

## 2024-07-28 PROCEDURE — 99233 SBSQ HOSP IP/OBS HIGH 50: CPT | Performed by: STUDENT IN AN ORGANIZED HEALTH CARE EDUCATION/TRAINING PROGRAM

## 2024-07-28 PROCEDURE — 36415 COLL VENOUS BLD VENIPUNCTURE: CPT | Performed by: STUDENT IN AN ORGANIZED HEALTH CARE EDUCATION/TRAINING PROGRAM

## 2024-07-28 PROCEDURE — 85610 PROTHROMBIN TIME: CPT

## 2024-07-28 PROCEDURE — 83735 ASSAY OF MAGNESIUM: CPT | Performed by: STUDENT IN AN ORGANIZED HEALTH CARE EDUCATION/TRAINING PROGRAM

## 2024-07-28 PROCEDURE — 250N000011 HC RX IP 250 OP 636

## 2024-07-28 PROCEDURE — 97116 GAIT TRAINING THERAPY: CPT | Mod: GP | Performed by: PHYSICAL THERAPIST

## 2024-07-28 PROCEDURE — 250N000013 HC RX MED GY IP 250 OP 250 PS 637

## 2024-07-28 PROCEDURE — 84100 ASSAY OF PHOSPHORUS: CPT | Performed by: STUDENT IN AN ORGANIZED HEALTH CARE EDUCATION/TRAINING PROGRAM

## 2024-07-28 PROCEDURE — 84132 ASSAY OF SERUM POTASSIUM: CPT | Performed by: STUDENT IN AN ORGANIZED HEALTH CARE EDUCATION/TRAINING PROGRAM

## 2024-07-28 PROCEDURE — 120N000001 HC R&B MED SURG/OB

## 2024-07-28 PROCEDURE — 36415 COLL VENOUS BLD VENIPUNCTURE: CPT

## 2024-07-28 PROCEDURE — 250N000013 HC RX MED GY IP 250 OP 250 PS 637: Performed by: STUDENT IN AN ORGANIZED HEALTH CARE EDUCATION/TRAINING PROGRAM

## 2024-07-28 PROCEDURE — 250N000011 HC RX IP 250 OP 636: Performed by: STUDENT IN AN ORGANIZED HEALTH CARE EDUCATION/TRAINING PROGRAM

## 2024-07-28 RX ORDER — ATORVASTATIN CALCIUM 40 MG/1
40 TABLET, FILM COATED ORAL EVERY EVENING
Status: DISCONTINUED | OUTPATIENT
Start: 2024-07-28 | End: 2024-07-30 | Stop reason: HOSPADM

## 2024-07-28 RX ORDER — WARFARIN SODIUM 3 MG/1
3 TABLET ORAL ONCE
Status: COMPLETED | OUTPATIENT
Start: 2024-07-28 | End: 2024-07-28

## 2024-07-28 RX ORDER — WARFARIN SODIUM 5 MG/1
5 TABLET ORAL
Status: COMPLETED | OUTPATIENT
Start: 2024-07-28 | End: 2024-07-28

## 2024-07-28 RX ADMIN — ATORVASTATIN CALCIUM 40 MG: 40 TABLET, FILM COATED ORAL at 20:05

## 2024-07-28 RX ADMIN — METFORMIN HYDROCHLORIDE 750 MG: 750 TABLET, EXTENDED RELEASE ORAL at 07:49

## 2024-07-28 RX ADMIN — WARFARIN SODIUM 5 MG: 5 TABLET ORAL at 17:02

## 2024-07-28 RX ADMIN — ASPIRIN 81 MG: 81 TABLET, COATED ORAL at 07:49

## 2024-07-28 RX ADMIN — GABAPENTIN 100 MG: 100 CAPSULE ORAL at 20:05

## 2024-07-28 RX ADMIN — INSULIN DETEMIR 12 UNITS: 100 INJECTION, SOLUTION SUBCUTANEOUS at 21:41

## 2024-07-28 RX ADMIN — WARFARIN SODIUM 3 MG: 3 TABLET ORAL at 12:46

## 2024-07-28 RX ADMIN — SENNOSIDES AND DOCUSATE SODIUM 1 TABLET: 50; 8.6 TABLET ORAL at 07:48

## 2024-07-28 RX ADMIN — GABAPENTIN 100 MG: 100 CAPSULE ORAL at 07:48

## 2024-07-28 RX ADMIN — METFORMIN HYDROCHLORIDE 750 MG: 750 TABLET, EXTENDED RELEASE ORAL at 20:05

## 2024-07-28 RX ADMIN — OXYCODONE HYDROCHLORIDE 10 MG: 5 TABLET ORAL at 12:45

## 2024-07-28 RX ADMIN — CEFTRIAXONE SODIUM 1 G: 1 INJECTION, POWDER, FOR SOLUTION INTRAMUSCULAR; INTRAVENOUS at 17:02

## 2024-07-28 RX ADMIN — FONDAPARINUX SODIUM 5 MG: 5 INJECTION SUBCUTANEOUS at 21:44

## 2024-07-28 RX ADMIN — PANTOPRAZOLE SODIUM 40 MG: 40 TABLET, DELAYED RELEASE ORAL at 06:44

## 2024-07-28 RX ADMIN — FERROUS SULFATE TAB 325 MG (65 MG ELEMENTAL FE) 325 MG: 325 (65 FE) TAB at 20:05

## 2024-07-28 RX ADMIN — GABAPENTIN 100 MG: 100 CAPSULE ORAL at 15:06

## 2024-07-28 RX ADMIN — FERROUS SULFATE TAB 325 MG (65 MG ELEMENTAL FE) 325 MG: 325 (65 FE) TAB at 07:49

## 2024-07-28 RX ADMIN — OXYCODONE HYDROCHLORIDE 10 MG: 5 TABLET ORAL at 20:05

## 2024-07-28 ASSESSMENT — ACTIVITIES OF DAILY LIVING (ADL)
ADLS_ACUITY_SCORE: 26
ADLS_ACUITY_SCORE: 27
ADLS_ACUITY_SCORE: 26
ADLS_ACUITY_SCORE: 27
ADLS_ACUITY_SCORE: 26
ADLS_ACUITY_SCORE: 26
ADLS_ACUITY_SCORE: 27
ADLS_ACUITY_SCORE: 26
ADLS_ACUITY_SCORE: 27
ADLS_ACUITY_SCORE: 27
ADLS_ACUITY_SCORE: 26
ADLS_ACUITY_SCORE: 26
ADLS_ACUITY_SCORE: 27
ADLS_ACUITY_SCORE: 26
ADLS_ACUITY_SCORE: 27
ADLS_ACUITY_SCORE: 26
ADLS_ACUITY_SCORE: 26
ADLS_ACUITY_SCORE: 27
ADLS_ACUITY_SCORE: 26

## 2024-07-28 NOTE — PROGRESS NOTES
Vascular surgery progress note    Subjective: Patient resting comfortably in bed.  Concerned this morning about bilateral numbness in medial thighs to touch.  Reassured that this is not unexpected after surgery likely related to stretching on cutaneous nerves from procedure.  Feels overall her left lower extremity function is improving.     Exam  Resting comfortably in bed no acute distress  Nonlabored breathing on room air  Regular rate per pedal pulses  Left lower extremity groin incisions clean dry and intact with no issues, soft no hematoma  Left lower extremity calf incision clean dry intact with no strikethrough  Palpable DP pulses left lower extremity  Palpable DP and PT pulses right lower extremity  Able to wiggle toes on left lower extremity sensation to light touch intact on left lower extremity.     INR 1.21 today    A/P  74-year-old female who presented with acute lower extremity ischemia of the left leg found to have acute aortic thrombus s/p aortic embolectomy via bilateral femoral cutdowns as well as AT embolectomy via left groin cutdown and fasciotomies which are now closed.   Doing very well postsurgery, she is now on Coumadin being bridged with fondaparinux.   [facility] [place]     -Continue PT/OT  -Encourage aggressive incentive spirometer  -Up out of bed  -Continue warfarin, with bridging.   -Appreciate hypercoagulable workup by heme-onc which has been negative thus far, plan is for her to follow-up as an outpatient with heme-onc closer to home.   -Will also need an event monitor on discharge to evaluate for paroxysmal A-fib, discussed with medicine yesterday    Alexis Brown MD  Vascular Surgery PGY4  To reach vascular surgery [place] team on call, please go to [facility] and from the drop down find the following:   VASCULAR SURGERY/[facility]  Then page the person listed to the right of day/night call. Can click the pager to  page.

## 2024-07-28 NOTE — PROGRESS NOTES
[facility]    Medicine Progress Note - Hospitalist Service    Date of Admission:  7/21/2024    Assessment & Plan   Lila Jenkins is a 74 year old female with past medical history significant for type II DM, HTN, recurrent UTI, nephrolithiasis admitted on 7/21/2024 with acute limb ischemia.      Pt presented to an outside hospital on 7/21 with acute onset left lower extremity numbness and pain. 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 LLE with occlusion noted in below knee popliteal artery with scattered runoff seen to the LLE, no inline flow to the foot. She has bety 2B acute limb ischemia resulting from acute aortic occlusion with distal embolization to LLE tibial vessels. She was transferred to [facility] for vascular surgery consultation. Pt was taken emergently for Aortic embolectomy, left anterior tibial embolectomy, bilateral iliac artery embolization and 4 compartment fasciotomies of the LLE. The procedure was well tolerated. The patient was extubated and stable upon arrival to the ICU. The hospitalist service was consulted for medical co-management.      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 with Bety 2B ischemia   S/p Aortic embolectomy, left anterior tibial embolectomy, bilateral iliac artery embolization and 4 compartment fasciotomies of the LLE (7/21/24)   S/p primary closure of anterior lateral and posterior fasciotomy sites 7/24/24  - Post-operative cares, dressing changes, anticoagulation, pain control, activity restrictions per vascular surgery    - Pt currently anticoagulated with argatroban due to allergy to heparin component, continue per vascular   - CTA chest and Echo ordered per vascular surgery for hypercoag work-up, consider heme/onc consultation  -  "per vascular: \"Pt with foot drop and ongoing neuro deficits, not surprising given >24 hours ischemia time, these will take time to recover and may not recover at all.\"  - Orthotics consult for foot drop boot   - consult heme onc for arterial clot   - Factor V Leiden negative, Prothrombin mutation negative   - MPN NGS, Flow cytometry for PNH, APLS w/up with anti-cardiolipin IgM and IgG and anti-beta2 glycoprotein IgM and IgG   - will need a minimum of 6 months of anticoagulation. Plan coumadin.   - 2 week ziopatch at discharge ordered. Results will need to be sent to her PCP Naeem Pulido MD in [place], [place]    Type II DM   Hyperglycemia  Very poorly controlled. Hemoglobin A1C is 10.9% on admission. Blood sugars in the high-200-500 range currently.   PTA regimen includes Detemir 12 units at bedtime, Metformin 750mg BID, and Berberine.   - Resume Detemir, 12 units   - MDSSI   - aspart 1u/15CHO  - Hypoglycemia protocol   - Resumed PTA metformin. Hold PTA supplements      Acute Blood Loss Anemia   Reactive Thrombocytosis   Hemoglobin on admission was around 9. 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 Additional left renal calyceal calculi. Air in the left renal calices along with perinephric stranding concern for emphysematous pyelonephritis. Non-obstructing R nephrolithiasis.   * Urology consulted and recommending ureteral stent placement today at 7pm given concern for infected urine/pus behind the stone in her left prox ureter.   * No urine culture collected at outside facility prior to transfer. Grew a pan-sensitive e.coli on 7/3/24 in urine  * Urine culture " from 7/21 at [facility] is no growth  - Plan 10 days of empiric treatment given degree of illness despite negative cultures at [facility] as no cultures collected at OSH and had already received IV abx.   - Appreciate urology recommendations. Signed off 7/23/24.    - Follow up with local urologist in [place], [place] for definitive stone management. Stones should be treated and stent removed within 3 months      Prolonged QT  QTc on admission is 499   - Avoid QT prolonging medications   - Telemetry      Leukocytosis, resolved  WBC initially within normal limits at 1.0, but then acutely koffi to 21.2 on admission, now down to 13.3.   - Monitor   - On Ceftriaxone as noted above for possible UTI.      Hypokalemia  Hypomagnesemia  Hypophosphatemia  - Replacement protocols ordered     Supplement Use   Pt takes a number of supplements including Alpha lipoic acid, Ashwaganda, Berberine, Bilberry, Biotin, Cinnamon, Collagen, Echinacea, Ginko Biloba, Culturell, Lecithin, Moringa Oleifera, Multivitamin, Vitamin C, and Vitamin D   - Will hold all supplements while hospitalized      Adrenal nodule   CT chest showed 4.6cm left adrenal nodule. Review of outside paperwork suggests this has been previously identified and stable from prior   - heme/onc w/up for possible malignancy    Small cystic lesions in head of pancreas  - recommend MRCP in 6 months (Jan 2025)          Diet: Advance Diet as Tolerated: Fully Advanced to diet(s) per Provider order; Regular Diet Adult; Moderate Consistent Carb (60 g CHO per Meal) Diet  Snacks/Supplements Adult: Other; Ensure Max; Between Meals    DVT Prophylaxis: argatroban  Hui Catheter: Not present  Lines: None     Cardiac Monitoring: None  Code Status: Full Code      Clinically Significant Risk Factors              # Hypoalbuminemia: Lowest albumin = 2.9 g/dL at 7/21/2024  8:05 PM, will monitor as appropriate                 #Precipitous drop in Hgb/Hct: Lowest Hgb this hospitalization: 7 g/dL. Will continue  to monitor and treat/transfuse as appropriate.    # DMII: A1C = 10.9 % (Ref range: <5.7 %) within past 6 months    # Moderate Malnutrition: based on nutrition assessment      # Financial/Environmental Concerns: none         Disposition Plan     Medically Ready for Discharge: Anticipated Tomorrow  Ultimate dispo per primary team, but would benefit from therapeutic INR prior to dispo.            Flavio Borden MD  Hospitalist Service  [facility]  Securely message with [employer] (more info)  Text page via [facility] Paging/Directory   ______________________________________________________________________    Interval History   Feels well. Getting to bathroom well. Okay for up OOB without brace if feeling well.   Flight scheduled for 7/30.  Discussed with Rn and vascular surgery.    Physical Exam   Vital Signs: Temp: 97.7  F (36.5  C) Temp src: Oral BP: 130/64 Pulse: 87   Resp: 19 SpO2: 95 % O2 Device: None (Room air)    Weight: 108 lbs 3.93 oz    Constitutional: Awake, alert, cooperative, no apparent distress  Respiratory: Clear to auscultation bilaterally, no crackles or wheezing  Cardiovascular: Regular rate and rhythm, normal S1 and S2, and no murmur noted  GI: Normal bowel sounds, soft, non-distended, non-tender  Skin/Integumen: No rashes, no cyanosis, no edema of exposed skin. LLE dressed in rooke boot. Groins dressed   Other:       Medical Decision Making       37 MINUTES SPENT BY ME on the date of service doing chart review, history, exam, documentation & further activities per the note.      Data   ------------------------- PAST 24 HR DATA REVIEWED -----------------------------------------------    I have personally reviewed the following data over the past 24 hrs:    N/A  \   N/A   / N/A     N/A N/A N/A /  129 (H)   4.1 N/A N/A \     INR:  1.21 (H) PTT:  N/A   D-dimer:  N/A Fibrinogen:  N/A       Imaging results reviewed over the past 24 hrs:   No results found for this or any previous  visit (from the past 24 hour(s)).

## 2024-07-28 NOTE — PLAN OF CARE
Goal Outcome Evaluation:      Plan of Care Reviewed With: patient    Overall Patient Progress: no changeOverall Patient Progress: no change     Shift: 7/27/24 4135-8160  Surgery/POD#: POD 6 bifemoral cutdown for aortic bifurcation occlusion on 7/21. 7/24 fasciotomy with I&D    Behavior & Aggression: Green  Fall Risk: Yes  Orientation: A&O x4  ABNL VS/O2: VSS on RA   Tele: NA  ABNL Labs: K, Mg & Phos replacement protocols - rechecks this AM;  Pain Management: PRN oxy, scheduled gabapentin  Bowel/Bladder: Voiding in BR, no BM this shift.  Drains: N/A  Lines: PIV SL  Skin: R/L groin sites, LLE w/ dressing & boot intact  Diet: mod carb, denies N/V  Activity Level: NOOBY, T/R (intermittently refused overnight)  Tests/Procedures: NA  Anticipated  DC Date: pending  Significant Information:   PCD on RLE, elevating LLE, IS at the bedside. Pulses strong per doppler

## 2024-07-28 NOTE — PLAN OF CARE
Goal Outcome Evaluation:      Plan of Care Reviewed With: patient    Date & Time: 7/28/24 1450  Surgery/POD#: POD 2 fasciotomy, lower extremity irrigation and debridement and closure  Behavior & Aggression: calm cooperative   Fall Risk: no   Orientation:alert and oriented   ABNL VS/O2:vss, lungs clear, O2 sats mid 90's room air   ABNL Labs:   Pain Management:oxycodone as needed for pain   Bowel/Bladder: good urine output, did report BM this shift, pt now having loose stools.  Drains:   Wounds/incisions left leg incisions with stitches, dressing changed per orders, scant drainage noted   Diet:regular diet with carb counting   Activity Level: up to bathroom independently with walker, OK for pt to ambulate without boot on   Tests/Procedures:   Anticipated  DC Date: Tuesday  Significant Information: pt reports having 2 loose BMs since 5pm

## 2024-07-28 NOTE — PROGRESS NOTES
Shift: 7/27/24 1724-8641  Surgery/POD#: POD 6/7 bifemoral cutdown for aortic bifurcation occlusion on 7/21. 7/24 fasciotomy with I&D    Behavior & Aggression: Green  Fall Risk: Yes  Orientation: AO4  ABNL VS/O2: VSS on RA   ABNL Labs: K/Mg/Phos replacement protocols, recheck in AM  Pain Management: Last pain rating of 4, declined medication  Bowel/Bladder: Voiding in BR, BM prior shift.  Lines: PIV SL  Skin: R/L groin sites, LLE w/ dressing & boot intact  Diet: Tolerating mod carb  Activity Level: A1GBW, turn+repo (refused overnight)  Anticipated DC Date: Pending improvement, possibly Tues to ARU pending PT/OT recs

## 2024-07-28 NOTE — PROGRESS NOTES
Care Management Follow Up    Length of Stay (days): 7    Expected Discharge Date: 07/30/2024     Concerns to be Addressed: discharge planning   (Distance to get back home)  Patient plan of care discussed at interdisciplinary rounds: Yes    Anticipated Discharge Disposition: Skilled Nursing Facility     Anticipated Discharge Services:    Anticipated Discharge DME:      Patient/family educated on Medicare website which has current facility and service quality ratings: yes  Education Provided on the Discharge Plan: Yes  Patient/Family in Agreement with the Plan: yes    Referrals Placed by CM/SW:    Private pay costs discussed: Not applicable    Additional Information:  Patients daughter has arranged for a flight to [place], [place]     Via [place] at 1500 flight number [id number] Tuesday July 30th    Will need to be at airport around noon Tuesday, writer instructed patiens daughter Noble that patient may need a fit to fly letter, and a wheelchair and attendant at the airport, writer will arrange transport to the airport via wheelchair.     BOBBI Dhaliwal

## 2024-07-29 ENCOUNTER — APPOINTMENT (OUTPATIENT)
Dept: OCCUPATIONAL THERAPY | Facility: CLINIC | Age: 74
DRG: 271 | End: 2024-07-29
Attending: SURGERY
Payer: MEDICARE

## 2024-07-29 ENCOUNTER — APPOINTMENT (OUTPATIENT)
Dept: PHYSICAL THERAPY | Facility: CLINIC | Age: 74
DRG: 271 | End: 2024-07-29
Attending: SURGERY
Payer: MEDICARE

## 2024-07-29 LAB
GLUCOSE BLDC GLUCOMTR-MCNC: 132 MG/DL (ref 70–99)
GLUCOSE BLDC GLUCOMTR-MCNC: 235 MG/DL (ref 70–99)
GLUCOSE BLDC GLUCOMTR-MCNC: 242 MG/DL (ref 70–99)
GLUCOSE BLDC GLUCOMTR-MCNC: 258 MG/DL (ref 70–99)
GLUCOSE SERPL-MCNC: 98 MG/DL (ref 70–99)
INR PPP: 1.35 (ref 0.85–1.15)
MAGNESIUM SERPL-MCNC: 1.8 MG/DL (ref 1.7–2.3)
PATH REPORT.COMMENTS IMP SPEC: NORMAL
PATH REPORT.FINAL DX SPEC: NORMAL
PATH REPORT.MICROSCOPIC SPEC OTHER STN: NORMAL
PATH REPORT.RELEVANT HX SPEC: NORMAL
PHOSPHATE SERPL-MCNC: 3.1 MG/DL (ref 2.5–4.5)
POTASSIUM SERPL-SCNC: 3.9 MMOL/L (ref 3.4–5.3)

## 2024-07-29 PROCEDURE — 250N000013 HC RX MED GY IP 250 OP 250 PS 637

## 2024-07-29 PROCEDURE — 250N000011 HC RX IP 250 OP 636: Performed by: STUDENT IN AN ORGANIZED HEALTH CARE EDUCATION/TRAINING PROGRAM

## 2024-07-29 PROCEDURE — 250N000013 HC RX MED GY IP 250 OP 250 PS 637: Performed by: SURGERY

## 2024-07-29 PROCEDURE — 99231 SBSQ HOSP IP/OBS SF/LOW 25: CPT | Mod: 24

## 2024-07-29 PROCEDURE — 250N000011 HC RX IP 250 OP 636

## 2024-07-29 PROCEDURE — 120N000001 HC R&B MED SURG/OB

## 2024-07-29 PROCEDURE — 84132 ASSAY OF SERUM POTASSIUM: CPT | Performed by: STUDENT IN AN ORGANIZED HEALTH CARE EDUCATION/TRAINING PROGRAM

## 2024-07-29 PROCEDURE — 36415 COLL VENOUS BLD VENIPUNCTURE: CPT | Performed by: SURGERY

## 2024-07-29 PROCEDURE — 99232 SBSQ HOSP IP/OBS MODERATE 35: CPT | Performed by: STUDENT IN AN ORGANIZED HEALTH CARE EDUCATION/TRAINING PROGRAM

## 2024-07-29 PROCEDURE — 36415 COLL VENOUS BLD VENIPUNCTURE: CPT

## 2024-07-29 PROCEDURE — 82947 ASSAY GLUCOSE BLOOD QUANT: CPT | Performed by: SURGERY

## 2024-07-29 PROCEDURE — 83735 ASSAY OF MAGNESIUM: CPT | Performed by: STUDENT IN AN ORGANIZED HEALTH CARE EDUCATION/TRAINING PROGRAM

## 2024-07-29 PROCEDURE — 97530 THERAPEUTIC ACTIVITIES: CPT | Mod: GP | Performed by: PHYSICAL THERAPIST

## 2024-07-29 PROCEDURE — 97535 SELF CARE MNGMENT TRAINING: CPT | Mod: GO

## 2024-07-29 PROCEDURE — 97110 THERAPEUTIC EXERCISES: CPT | Mod: GP | Performed by: PHYSICAL THERAPIST

## 2024-07-29 PROCEDURE — 250N000013 HC RX MED GY IP 250 OP 250 PS 637: Performed by: STUDENT IN AN ORGANIZED HEALTH CARE EDUCATION/TRAINING PROGRAM

## 2024-07-29 PROCEDURE — 85610 PROTHROMBIN TIME: CPT

## 2024-07-29 PROCEDURE — 84100 ASSAY OF PHOSPHORUS: CPT | Performed by: STUDENT IN AN ORGANIZED HEALTH CARE EDUCATION/TRAINING PROGRAM

## 2024-07-29 RX ORDER — PANTOPRAZOLE SODIUM 40 MG/1
40 TABLET, DELAYED RELEASE ORAL
DISCHARGE
Start: 2024-07-30 | End: 2024-07-30

## 2024-07-29 RX ORDER — POLYETHYLENE GLYCOL 3350 17 G/17G
17 POWDER, FOR SOLUTION ORAL DAILY
DISCHARGE
Start: 2024-07-29 | End: 2024-07-30

## 2024-07-29 RX ORDER — GABAPENTIN 100 MG/1
100 CAPSULE ORAL 3 TIMES DAILY
DISCHARGE
Start: 2024-07-29 | End: 2024-07-30

## 2024-07-29 RX ORDER — FERROUS SULFATE 325(65) MG
325 TABLET ORAL EVERY OTHER DAY
DISCHARGE
Start: 2024-07-29 | End: 2024-07-30

## 2024-07-29 RX ORDER — ATORVASTATIN CALCIUM 40 MG/1
40 TABLET, FILM COATED ORAL EVERY EVENING
DISCHARGE
Start: 2024-07-29 | End: 2024-07-30

## 2024-07-29 RX ORDER — WARFARIN SODIUM 2 MG/1
8 TABLET ORAL
Status: COMPLETED | OUTPATIENT
Start: 2024-07-29 | End: 2024-07-29

## 2024-07-29 RX ORDER — ASPIRIN 81 MG/1
81 TABLET ORAL DAILY
DISCHARGE
Start: 2024-07-30 | End: 2024-07-30

## 2024-07-29 RX ORDER — OXYCODONE HYDROCHLORIDE 10 MG/1
5-10 TABLET ORAL EVERY 6 HOURS PRN
Qty: 12 TABLET | Refills: 0 | Status: SHIPPED | OUTPATIENT
Start: 2024-07-29 | End: 2024-08-05

## 2024-07-29 RX ORDER — FONDAPARINUX SODIUM 5 MG/.4ML
5 INJECTION SUBCUTANEOUS AT BEDTIME
DISCHARGE
Start: 2024-07-29 | End: 2024-07-30

## 2024-07-29 RX ADMIN — FERROUS SULFATE TAB 325 MG (65 MG ELEMENTAL FE) 325 MG: 325 (65 FE) TAB at 20:37

## 2024-07-29 RX ADMIN — GABAPENTIN 100 MG: 100 CAPSULE ORAL at 14:09

## 2024-07-29 RX ADMIN — FONDAPARINUX SODIUM 5 MG: 5 INJECTION SUBCUTANEOUS at 22:33

## 2024-07-29 RX ADMIN — OXYCODONE HYDROCHLORIDE 5 MG: 5 TABLET ORAL at 16:18

## 2024-07-29 RX ADMIN — SENNOSIDES AND DOCUSATE SODIUM 1 TABLET: 50; 8.6 TABLET ORAL at 20:37

## 2024-07-29 RX ADMIN — OXYCODONE HYDROCHLORIDE 5 MG: 5 TABLET ORAL at 20:37

## 2024-07-29 RX ADMIN — GABAPENTIN 100 MG: 100 CAPSULE ORAL at 20:37

## 2024-07-29 RX ADMIN — CEFTRIAXONE SODIUM 1 G: 1 INJECTION, POWDER, FOR SOLUTION INTRAMUSCULAR; INTRAVENOUS at 17:06

## 2024-07-29 RX ADMIN — INSULIN DETEMIR 12 UNITS: 100 INJECTION, SOLUTION SUBCUTANEOUS at 22:33

## 2024-07-29 RX ADMIN — FERROUS SULFATE TAB 325 MG (65 MG ELEMENTAL FE) 325 MG: 325 (65 FE) TAB at 08:51

## 2024-07-29 RX ADMIN — ACETAMINOPHEN 650 MG: 325 TABLET, FILM COATED ORAL at 12:16

## 2024-07-29 RX ADMIN — METFORMIN HYDROCHLORIDE 750 MG: 750 TABLET, EXTENDED RELEASE ORAL at 20:37

## 2024-07-29 RX ADMIN — WARFARIN SODIUM 8 MG: 2 TABLET ORAL at 17:06

## 2024-07-29 RX ADMIN — METFORMIN HYDROCHLORIDE 750 MG: 750 TABLET, EXTENDED RELEASE ORAL at 08:50

## 2024-07-29 RX ADMIN — ASPIRIN 81 MG: 81 TABLET, COATED ORAL at 08:51

## 2024-07-29 RX ADMIN — GABAPENTIN 100 MG: 100 CAPSULE ORAL at 08:51

## 2024-07-29 RX ADMIN — ATORVASTATIN CALCIUM 40 MG: 40 TABLET, FILM COATED ORAL at 20:37

## 2024-07-29 ASSESSMENT — ACTIVITIES OF DAILY LIVING (ADL)
ADLS_ACUITY_SCORE: 27

## 2024-07-29 NOTE — PLAN OF CARE
Occupational Therapy Discharge Summary    Reason for therapy discharge:    All goals and outcomes met, no further needs identified.    Progress towards therapy goal(s). See goals on Care Plan in [facility] electronic health record for goal details.  Goals met    Therapy recommendation(s):    No further therapy is recommended. Pt making great progress during IP stay. Pt plans to return back to hometown tomorrow on plane and have assist from family at home with heavier IADL's. Pt reports being safe and close to baseline with all ADL's. Anticipate pt will be safe to d/c home w/ assist from family.

## 2024-07-29 NOTE — PLAN OF CARE
Goal Outcome Evaluation:      Plan of Care Reviewed With: patient    Overall Patient Progress: improvingOverall Patient Progress: improving       Date & Time: 7/28/24-07/29/24 4349-4000  Surgery/POD#: 07/21 Bilateral femoral cut down Aortic amerron embolectomy LLE, LL fasciotomities, L anterior tibial embolectomy and repair.  07/22 cystoscopy with retrograde pyleogram and left uteral stent placement.  07/24 fasciotomy, LE I&D and closure.     Behavior & Aggression: calm cooperative   Fall Risk: no   Orientation:A/OX4  ABNL VS/O2: VSS on RA  ABNL Labs: INR 1.21,   Pain Management: no intervention overnight  Bowel/Bladder: Urge incontinence, loose stools. BSC within reach.   Drains: PIV SL  Wounds/incisions left leg incisions with stitches, drsg, CDI. Bilateral groin site soft, CDI  Diet:regular diet with carb counting   Activity Level: up to  BSC independently with walker, OK for pt to ambulate without boot on   Tests/Procedures: None  Anticipated  DC Date: Tuesday to [place], [place].   Significant Information:  at  0200   Numbness to LLE present. Edema 1+ to L ankle and foot, elevate.

## 2024-07-29 NOTE — PROGRESS NOTES
Hematology/Oncology Follow-up Note  [facility]    Today's Date: 07/25/24   Date of Admission:  7/21/2024   Reason for Consult: aortic thrombus s/p thrombectomy    ASSESSMENT/ PLAN : Lila Jenkins is a 74 year old year old female presented to the hospital with acute onset LLE numbness and pain requiring emergent aortic embolectomy and fasciotomies on 7/21/24.    Aortic thrombus  CTA on 7/21/24  IMPRESSION:  1.  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 Also small dissection in the proximal right external iliac artery.  2.  Intraluminal filling defect in the right common femoral artery both hard and soft plaque and relatively narrow, though extends beyond retirement through the arterial lumen.  3.  Diffuse atherosclerotic calcification throughout the infrarenal abdominal aorta. No obvious or overt area of ulceration.  4.  Moderate to severe stenosis in the proximal left renal artery.  5.  Vascular left adrenal mass measuring 4.7 x 3.4 cm. Exact etiology is uncertain, though malignancy is certainly a consideration. Would consider MRI or biopsy for most definitive evaluation.    CT abdomen 7/21/24  IMPRESSION:  There is a heterogeneously enhancing left adrenal mass measuring 4.3 cm. Washout characteristics are indeterminate for adrenal adenoma. Given size greater than 4 cm, surgical consultation recommended.   Indeterminate right adnexal mass measuring 5.5 cm. Recommend further evaluation with pelvic ultrasound.      PLAN:  Suspicion of provoked thrombus multifactorial- adrenal mass, infection.   Hypercoagulable work-up above is thus far negative. Waiting on remaining blood work   Continue Eliquis, will need follow up with home hematologist, anticipating anticoagulation for 6 months.   Will continue to follow         DISCUSSION:  Patient is frustrated about her discharge disposition. She feels the need to  have more time in rehab versus going right to her home. Her daughter is coordinating for her so she feels well supported.     MEDICATIONS:  Reviewed     LAB:  Reviewed     PHYSICAL EXAM:  Vital signs:  Temp: 98.2  F (36.8  C) Temp src: Oral BP: 136/61 Pulse: 91   Resp: 16 SpO2: 93 % O2 Device: None (Room air) Oxygen Delivery: 1 LPM Height: 152.4 cm (5') Weight: 47.1 kg (103 lb 13.4 oz)  Estimated body mass index is 20.28 kg/m  as calculated from the following:    Height as of this encounter: 1.524 m (5').    Weight as of this encounter: 47.1 kg (103 lb 13.4 oz).      GENERAL/CONSTITUTIONAL: No acute distress.      Thank you for involving us in this patients care.    Bianka BELCHER, CNP  Hematology/Oncology  [facility]   Pager #[phone]

## 2024-07-29 NOTE — PROGRESS NOTES
Date & Time: 7/28/24: 1900-2330hr  Surgery/POD#: POD 2 fasciotomy, lower extremity irrigation and debridement and closure  Behavior & Aggression: calm cooperative   Fall Risk: no   Orientation:A/OX4  ABNL VS/O2: VSS on RA  ABNL Labs: INR 1.21,   Pain Management: PRN oxycodone X1, yulia gabapentin  Bowel/Bladder: Urge incontinence, loose stools. BSC within reach.   Drains: PIV SL  Wounds/incisions left leg incisions with stitches, drsg, CDI.   Diet:regular diet with carb counting   Activity Level: up to  BSC independently with walker, OK for pt to ambulate without boot on   Tests/Procedures: None  Anticipated  DC Date: Tuesday to [place], [place].   Significant Information:  at HS,covered, monitor throughout the night. Numbness to LLE present. Edema 1+ to L ankle and foot, elevate.

## 2024-07-29 NOTE — PROGRESS NOTES
Vascular surgery progress note    Subjective: Patient resting comfortably in bed, no acute distress, was able to walk in halls yesterday without PRAFO boot on without issue in her left foot, did not appreciate issues with foot drop.     Exam  Resting comfortably in bed no acute distress  Nonlabored breathing on room air  Regular rate per pedal pulses  Groin incisions clean dry and intact with no issues, soft no hematoma  Left lower extremity calf incision clean dry intact  Palpable DP pulses left lower extremity  Palpable DP and PT pulses right lower extremity  Able to wiggle toes on left lower extremity sensation to light touch intact on left lower extremity.     INR 1.21 yesterday, pending this am.     A/P  74-year-old female who presented with acute lower extremity ischemia of the left leg found to have acute aortic thrombus s/p aortic embolectomy via bilateral femoral cutdowns as well as AT embolectomy via left groin cutdown and fasciotomies which are now closed.   Doing very well postsurgery, she is now on Coumadin being bridged with fondaparinux.   Plan for discharge to rehab facility in [place], tentatively flight is at 3 PM on Tuesday     -Continue PT/OT  -Encourage aggressive incentive spirometer  -Up out of bed  -Continue warfarin, with bridging with fondaparinux given heparin allergy  -Appreciate hypercoagulable workup by heme-onc which has been negative thus far, plan is for her to follow-up as an outpatient with heme-onc closer to home.   -Will also need an event monitor on discharge to evaluate for paroxysmal A-fib, discussed with medicine   -OK to be out of bed without PRAFO boot - this is primarily to help with foot drop, if patient is not having issues with foot drop while walking, ok to be off while she does, if she notes foot dragging or issues with walking, should wear boot when walking.    Alexis Brown MD  Vascular Surgery PGY4  To reach vascular surgery [facility] team on call, please go to [facility]  and from the drop down find the following:   [facility]  Then page the person listed to the right of day/night call. Can click the pager to page.           STAFF: Examined this morning.  Continues to do well.  Did much better with physical therapy and was able to walk the hallways without the left foot splint.  Tolerating diet.    Left fasciotomy sites are healing well.  Dressings changed with Xeroform followed by ABD pads and Kerlix roll and Spandage to hold in place.    Excellent pedal pulses.  Improved dorsiflexion of left foot to almost 0 degrees.    Bridging to Coumadin with today's INR= 1.35    7/25/2024 Hgb= 7.3 consistent with acute blood loss anemia.  On iron.  Recheck tomorrow with INR    Plans to fly to her home tomorrow afternoon.  Will check INR in the morning.  Discussed postoperative cares.  Recommend removal of every other suture from fasciotomy sites in approximately 2 weeks and several days later all sutures if appropriate.       Ken Corrales MD

## 2024-07-29 NOTE — PLAN OF CARE
Goal Outcome Evaluation:    Date & Time:7/29/24  0700 - 1900   Surgery/POD#:  07/21 Bilateral femoral cut down Aortic amerron embolectomy LLE, LL fasciotomities, L anterior tibial embolectomy and repair.  07/22 cystoscopy with retrograde pyleogram and left uteral stent placement.  07/24 fasciotomy, LE I&D and closure.   Behavior & Aggression: Green   Fall Risk: Yes   Orientation: A&Ox4   ABNL VS/O2: VSS on RA   ABNL Labs: Blood glucose checks   Pain Management: PRN Tylenol & oxy, Scheduled gabapentin   Bowel/Bladder: Voids adequately, BS active, passing gas, no NM   Drains: PIV R arm SL   Wounds/incisions: L & R groin incision CDI, LLE w/ dressing & boot intact  Diet: Tolerating mod carb  Activity Level: SBA w/ gb + walker   Tests/Procedures: N/A   Anticipated DC Date: Discharge tomorrow morning.   Significant Information: CMS is intact

## 2024-07-29 NOTE — PLAN OF CARE
Goal Outcome Evaluation:       Patient will be discharging home Tuesday, pending facility placement, if no placement found home with family

## 2024-07-29 NOTE — PROGRESS NOTES
"[facility]    Medicine Progress Note - Hospitalist Service    Date of Admission:  7/21/2024    Assessment & Plan   Lila Jenkins is a 74 year old female with past medical history significant for type II DM, HTN, recurrent UTI, nephrolithiasis admitted on 7/21/2024 with acute limb ischemia.      Pt presented to an outside hospital on 7/21 with acute onset left lower extremity numbness and pain. 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 LLE with occlusion noted in below knee popliteal artery with scattered runoff seen to the LLE, no inline flow to the foot. She has bety 2B acute limb ischemia resulting from acute aortic occlusion with distal embolization to LLE tibial vessels. She was transferred to [facility] for vascular surgery consultation. Pt was taken emergently for Aortic embolectomy, left anterior tibial embolectomy, bilateral iliac artery embolization and 4 compartment fasciotomies of the LLE. The procedure was well tolerated. The patient was extubated and stable upon arrival to the ICU. The hospitalist service was consulted for medical co-management.     Her post-operative course has been uncomplicated with good improvement in her functional status. She was started on warfarin which she will need to be on for a minimum of 6 months.      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 with Bety 2B ischemia   S/p Aortic embolectomy, left anterior tibial embolectomy, bilateral iliac artery embolization and 4 compartment fasciotomies of the LLE (7/21/24)   S/p primary closure of anterior lateral and posterior fasciotomy sites 7/24/24  - Post-operative cares, dressing changes, anticoagulation, pain control, activity restrictions per vascular surgery     per vascular: \"Pt " "with foot drop and ongoing neuro deficits, not surprising given >24 hours ischemia time, these will take time to recover and may not recover at all.\"  - PRAFO ordered for foot drop  - consult heme onc for arterial clot   - Factor V Leiden negative, Prothrombin mutation negative   - MPN NGS, Flow cytometry for PNH, APLS w/up with anti-cardiolipin IgM and IgG and anti-beta2 glycoprotein IgM and IgG   - will need a minimum of 6 months of anticoagulation. Plan coumadin.   - 2 week ziopatch at discharge ordered. Results will need to be sent to her PCP Naeem Pulido MD in [place], [place]    Type II DM   Hyperglycemia, resolved  Hemoglobin A1C is 10.9% on admission. Blood sugars in the high-200-500 range currently.   PTA regimen includes Detemir 12 units at bedtime, Metformin 750mg BID, and Berberine.   - Resume Detemir, 12 units   - MDSSI   - aspart 1u/15CHO  - Hypoglycemia protocol   - Resumed PTA metformin. Hold PTA supplements      Acute Blood Loss Anemia   Reactive Thrombocytosis   Hemoglobin on admission was around 9. 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 Additional left renal calyceal calculi. Air in the left renal calices along with perinephric stranding concern for emphysematous pyelonephritis. Non-obstructing R nephrolithiasis.   * Urology consulted and recommending ureteral stent placement today at 7pm given concern for infected urine/pus behind the stone in her left prox ureter.   * No urine culture collected at outside facility prior to transfer. Grew a pan-sensitive e.coli on 7/3/24 in urine  * Urine culture from 7/21 at [facility] is no growth  - Plan " empiric treatment given degree of illness despite negative cultures at [place] as no cultures collected at OSH and had already received IV abx. Abx to end on 7/30  - Appreciate urology recommendations. Signed off 7/23/24.    - Follow up with local urologist in [place], [place] for definitive stone management. Stones should be treated and stent removed within 3 months      Prolonged QT  QTc on admission is 499   - Avoid QT prolonging medications      Leukocytosis, resolved  Suspect reactive, possibly related to UTI.     Hypokalemia  Hypomagnesemia  Hypophosphatemia  - Replacement protocols ordered     Supplement Use   Pt takes a number of supplements including Alpha lipoic acid, Ashwaganda, Berberine, Bilberry, Biotin, Cinnamon, Collagen, Echinacea, Ginko Biloba, Culturell, Lecithin, Moringa Oleifera, Multivitamin, Vitamin C, and Vitamin D   - Will hold all supplements while hospitalized      Adrenal nodule   CT chest showed 4.6cm left adrenal nodule. Review of outside paperwork suggests this has been previously identified and stable from prior   - heme/onc w/up for possible malignancy    Small cystic lesions in head of pancreas  - recommend MRCP in 6 months (Jan 2025)          Diet: Advance Diet as Tolerated: Fully Advanced to diet(s) per Provider order; Regular Diet Adult; Moderate Consistent Carb (60 g CHO per Meal) Diet  Snacks/Supplements Adult: Other; Ensure Max; Between Meals  Diet    DVT Prophylaxis: argatroban  Hui Catheter: Not present  Lines: None     Cardiac Monitoring: None  Code Status: Full Code      Clinically Significant Risk Factors              # Hypoalbuminemia: Lowest albumin = 2.9 g/dL at 7/21/2024  8:05 PM, will monitor as appropriate                 #Precipitous drop in Hgb/Hct: Lowest Hgb this hospitalization: 7 g/dL. Will continue to monitor and treat/transfuse as appropriate.    # DMII: A1C = 10.9 % (Ref range: <5.7 %) within past 6 months    # Moderate Malnutrition: based on nutrition  assessment      # Financial/Environmental Concerns: none         Disposition Plan     Medically Ready for Discharge: Ready Now  Ultimate dispo per primary team, but would benefit from therapeutic INR prior to dispo.            Flavio Borden MD  Hospitalist Service  [facility]  Securely message with Aline (more info)  Text page via [facility] Paging/Directory   ______________________________________________________________________    Interval History   Discussed with RN, MARII, and Dr. Brown from vascular surgery.   Patient feels well. Pain controlled. Eager for discharge. She is moving well, but likely not safe for home yet. Will have PT re-assess.     Physical Exam   Vital Signs: Temp: 98.2  F (36.8  C) Temp src: Oral BP: 136/61 Pulse: 91   Resp: 16 SpO2: 93 % O2 Device: None (Room air)    Weight: 103 lbs 13.39 oz    Constitutional: Awake, alert, cooperative, no apparent distress  Respiratory: Clear to auscultation bilaterally, no crackles or wheezing  Cardiovascular: Regular rate and rhythm, normal S1 and S2, and no murmur noted  GI: Normal bowel sounds, soft, non-distended, non-tender  Skin/Integumen: No rashes, no cyanosis, no edema of exposed skin. LLE dressed in PRAFO.   Other:       Medical Decision Making       37 MINUTES SPENT BY ME on the date of service doing chart review, history, exam, documentation & further activities per the note.      Data   ------------------------- PAST 24 HR DATA REVIEWED -----------------------------------------------    I have personally reviewed the following data over the past 24 hrs:    N/A  \   N/A   / N/A     N/A N/A N/A /  235 (H)   3.9 N/A N/A \     INR:  1.35 (H) PTT:  N/A   D-dimer:  N/A Fibrinogen:  N/A       Imaging results reviewed over the past 24 hrs:   No results found for this or any previous visit (from the past 24 hour(s)).

## 2024-07-29 NOTE — PROGRESS NOTES
"Care Management Follow Up    Length of Stay (days): 8    Expected Discharge Date: 07/30/2024     Concerns to be Addressed: discharge planning   (Distance to get back home)  Patient plan of care discussed at interdisciplinary rounds: Yes    Anticipated Discharge Disposition: Skilled Nursing Facility     Anticipated Discharge Services:    Anticipated Discharge DME:      Patient/family educated on Medicare website which has current facility and service quality ratings: yes  Education Provided on the Discharge Plan: Yes  Patient/Family in Agreement with the Plan: yes    Referrals Placed by CM/SW:    Private pay costs discussed: Not applicable    Additional Information:  Writer asked provider for a \"fit to fly letter\"   Writer resent updated PT notes to try and secure TCU or ARU with   [facility] but they declined due to therapy notes     Message left with [facility]       BOBBI Dhaliwal    "

## 2024-07-29 NOTE — PROGRESS NOTES
Care Management Follow Up    Length of Stay (days): 8    Expected Discharge Date: 07/30/2024     Concerns to be Addressed: discharge planning   (Distance to get back home)  Patient plan of care discussed at interdisciplinary rounds: Yes    Anticipated Discharge Disposition: Skilled Nursing Facility     Anticipated Discharge Services:    Anticipated Discharge DME:      Patient/family educated on Medicare website which has current facility and service quality ratings: yes  Education Provided on the Discharge Plan: Yes  Patient/Family in Agreement with the Plan: yes    Referrals Placed by CM/SW:    Private pay costs discussed: Not applicable    Additional Information:      [facility] ARU has availablity to accept patient if PT and OT notes think patient can tolerate therapies      Writer [facility] Wheelchair transport for 1035 - 1120 Tuesday morning. Patient has a flight on [employer] at 1500 (with an  attendant) and will need to arrive at airport at noon.     ADD -   [facility] can not accept patient.     [facility] is still possible if they receive appropriate documentation that patient is able to tolerate up to three hours of therapy daily. Or at least needs TCU    Writer sent out several more referrals to various TCU/Swing bed facilities in the [place] and surrounding areas.     Addendum - as of 1648 writer has not secured TCU and does not have the updated notes needed for [facility].   Writer currently on phone with patients sister Palomo 1 [phone] to see if mobility assistance was obtained at the airport.       BOBBI Dhaliwal

## 2024-07-30 ENCOUNTER — LAB REQUISITION (OUTPATIENT)
Dept: LAB | Facility: CLINIC | Age: 74
End: 2024-07-30

## 2024-07-30 ENCOUNTER — TELEPHONE (OUTPATIENT)
Dept: GERIATRICS | Facility: CLINIC | Age: 74
End: 2024-07-30
Payer: MEDICARE

## 2024-07-30 VITALS
TEMPERATURE: 98.1 F | BODY MASS INDEX: 20.3 KG/M2 | HEART RATE: 95 BPM | OXYGEN SATURATION: 98 % | HEIGHT: 60 IN | RESPIRATION RATE: 15 BRPM | DIASTOLIC BLOOD PRESSURE: 55 MMHG | WEIGHT: 103.4 LBS | SYSTOLIC BLOOD PRESSURE: 116 MMHG

## 2024-07-30 VITALS
HEART RATE: 82 BPM | OXYGEN SATURATION: 98 % | TEMPERATURE: 98.3 F | RESPIRATION RATE: 18 BRPM | SYSTOLIC BLOOD PRESSURE: 116 MMHG | DIASTOLIC BLOOD PRESSURE: 56 MMHG

## 2024-07-30 DIAGNOSIS — Z11.1 ENCOUNTER FOR SCREENING FOR RESPIRATORY TUBERCULOSIS: ICD-10-CM

## 2024-07-30 LAB
CREAT SERPL-MCNC: 0.71 MG/DL (ref 0.51–0.95)
EGFRCR SERPLBLD CKD-EPI 2021: 89 ML/MIN/1.73M2
ERYTHROCYTE [DISTWIDTH] IN BLOOD BY AUTOMATED COUNT: 14.6 % (ref 10–15)
GLUCOSE BLDC GLUCOMTR-MCNC: 261 MG/DL (ref 70–99)
GLUCOSE BLDC GLUCOMTR-MCNC: 287 MG/DL (ref 70–99)
HCT VFR BLD AUTO: 22.9 % (ref 35–47)
HGB BLD-MCNC: 7.3 G/DL (ref 11.7–15.7)
INR PPP: 1.57 (ref 0.85–1.15)
MCH RBC QN AUTO: 32 PG (ref 26.5–33)
MCHC RBC AUTO-ENTMCNC: 31.9 G/DL (ref 31.5–36.5)
MCV RBC AUTO: 100 FL (ref 78–100)
PLATELET # BLD AUTO: 629 10E3/UL (ref 150–450)
RBC # BLD AUTO: 2.28 10E6/UL (ref 3.8–5.2)
WBC # BLD AUTO: 7.1 10E3/UL (ref 4–11)

## 2024-07-30 PROCEDURE — 85041 AUTOMATED RBC COUNT: CPT | Performed by: SURGERY

## 2024-07-30 PROCEDURE — 36415 COLL VENOUS BLD VENIPUNCTURE: CPT | Performed by: SURGERY

## 2024-07-30 PROCEDURE — 250N000013 HC RX MED GY IP 250 OP 250 PS 637: Performed by: SURGERY

## 2024-07-30 PROCEDURE — 99232 SBSQ HOSP IP/OBS MODERATE 35: CPT | Performed by: INTERNAL MEDICINE

## 2024-07-30 PROCEDURE — 82565 ASSAY OF CREATININE: CPT

## 2024-07-30 PROCEDURE — 250N000013 HC RX MED GY IP 250 OP 250 PS 637: Performed by: STUDENT IN AN ORGANIZED HEALTH CARE EDUCATION/TRAINING PROGRAM

## 2024-07-30 PROCEDURE — 250N000013 HC RX MED GY IP 250 OP 250 PS 637

## 2024-07-30 PROCEDURE — 99231 SBSQ HOSP IP/OBS SF/LOW 25: CPT | Mod: 24 | Performed by: NURSE PRACTITIONER

## 2024-07-30 PROCEDURE — 85610 PROTHROMBIN TIME: CPT | Performed by: SURGERY

## 2024-07-30 RX ORDER — PANTOPRAZOLE SODIUM 40 MG/1
40 TABLET, DELAYED RELEASE ORAL
Qty: 30 TABLET | Refills: 3 | Status: SHIPPED | OUTPATIENT
Start: 2024-07-30 | End: 2024-08-05

## 2024-07-30 RX ORDER — GABAPENTIN 100 MG/1
100 CAPSULE ORAL 3 TIMES DAILY
Qty: 90 CAPSULE | Refills: 3 | Status: SHIPPED | OUTPATIENT
Start: 2024-07-30 | End: 2024-08-05

## 2024-07-30 RX ORDER — POLYETHYLENE GLYCOL 3350 17 G/17G
17 POWDER, FOR SOLUTION ORAL DAILY
Qty: 510 G | Refills: 0 | Status: SHIPPED | OUTPATIENT
Start: 2024-07-30

## 2024-07-30 RX ORDER — FONDAPARINUX SODIUM 5 MG/.4ML
5 INJECTION SUBCUTANEOUS AT BEDTIME
Qty: 4 ML | Refills: 0 | Status: SHIPPED | OUTPATIENT
Start: 2024-07-30 | End: 2024-08-05

## 2024-07-30 RX ORDER — WARFARIN SODIUM 2 MG/1
8 TABLET ORAL
Status: DISCONTINUED | OUTPATIENT
Start: 2024-07-30 | End: 2024-07-30

## 2024-07-30 RX ORDER — ASPIRIN 81 MG/1
81 TABLET ORAL DAILY
Qty: 90 TABLET | Refills: 3 | Status: SHIPPED | OUTPATIENT
Start: 2024-07-30 | End: 2024-08-05

## 2024-07-30 RX ORDER — WARFARIN SODIUM 5 MG/1
10 TABLET ORAL
Status: DISCONTINUED | OUTPATIENT
Start: 2024-07-30 | End: 2024-07-30 | Stop reason: HOSPADM

## 2024-07-30 RX ORDER — ATORVASTATIN CALCIUM 40 MG/1
40 TABLET, FILM COATED ORAL EVERY EVENING
Qty: 90 TABLET | Refills: 3 | Status: SHIPPED | OUTPATIENT
Start: 2024-07-30 | End: 2024-08-05

## 2024-07-30 RX ORDER — FERROUS SULFATE 325(65) MG
325 TABLET ORAL EVERY OTHER DAY
Qty: 30 TABLET | Refills: 3 | Status: SHIPPED | OUTPATIENT
Start: 2024-07-30 | End: 2024-08-05

## 2024-07-30 RX ADMIN — METFORMIN HYDROCHLORIDE 750 MG: 750 TABLET, EXTENDED RELEASE ORAL at 08:00

## 2024-07-30 RX ADMIN — GABAPENTIN 100 MG: 100 CAPSULE ORAL at 07:51

## 2024-07-30 RX ADMIN — ASPIRIN 81 MG: 81 TABLET, COATED ORAL at 08:00

## 2024-07-30 RX ADMIN — GABAPENTIN 100 MG: 100 CAPSULE ORAL at 13:00

## 2024-07-30 RX ADMIN — FERROUS SULFATE TAB 325 MG (65 MG ELEMENTAL FE) 325 MG: 325 (65 FE) TAB at 08:00

## 2024-07-30 ASSESSMENT — ACTIVITIES OF DAILY LIVING (ADL)
ADLS_ACUITY_SCORE: 27

## 2024-07-30 NOTE — PROGRESS NOTES
[facility]    Medicine Progress Note - Hospitalist Service    Date of Admission:  7/21/2024    Assessment & Plan   Patient seen and examined late this morning.     See Dr. Borden's note from 7/29/24 for complete details of hospitalization.     The remaining piece of the discharge plans have aligned and the patient has been accepted at [facility] today.     Arrangements for potential transfer between [facility] and [facility] choice of TCU in [place], [place] can be discussed with the social U .     Discharge medications reviewed in detail. Recommend daily INR checks with fondaparinux injections bridging to therapeutic range.           Diet: Advance Diet as Tolerated: Fully Advanced to diet(s) per Provider order; Regular Diet Adult; Moderate Consistent Carb (60 g CHO per Meal) Diet  Snacks/Supplements Adult: Other; Ensure Max; Between Meals  Diet    DVT Prophylaxis: Warfarin  Hui Catheter: Not present  Lines: None     Cardiac Monitoring: None  Code Status: Full Code      Clinically Significant Risk Factors              # Hypoalbuminemia: Lowest albumin = 2.9 g/dL at 7/21/2024  8:05 PM, will monitor as appropriate             #Precipitous drop in Hgb/Hct: Lowest Hgb this hospitalization: 7 g/dL. Will continue to monitor and treat/transfuse as appropriate.    # DMII: A1C = 10.9 % (Ref range: <5.7 %) within past 6 months    # Moderate Malnutrition: based on nutrition assessment    # Financial/Environmental Concerns: none         Disposition Plan     Medically Ready for Discharge: Ready Now     Dalia uKnz MD  Hospitalist Service  [facility]  Securely message with [employer] (more info)  Text page via [facility] Paging/Directory   ______________________________________________________________________    Interval History   Case discussed with Dr. Borden on Monday, including discharge plans.     INR this morning 1.57. Will need at least another day of  fondaparinux subcutaneous injections until INR 2-3 range. Preferably 2.5.     Physical Exam   Vital Signs: Temp: 98.1  F (36.7  C) Temp src: Oral BP: 116/55 Pulse: 95   Resp: 15 SpO2: 98 % O2 Device: None (Room air)    Weight: 103 lbs 6.33 oz    General Appearance: Sitting up eating lunch at edge of bed, pleasant and conversant  Respiratory: clear, non-labored  Skin: no jaundice, numerous bruises on arms  Ext: Left lower leg boot in place     Medical Decision Making       27 MINUTES SPENT BY ME on the date of service doing chart review, history, exam, documentation & further activities per the note.      Data     I have personally reviewed the following data over the past 24 hrs:    7.1  \   7.3 (L)   / 629 (H)     N/A N/A N/A /  287 (H)   N/A N/A 0.71 \     INR:  1.57 (H) PTT:  N/A   D-dimer:  N/A Fibrinogen:  N/A

## 2024-07-30 NOTE — PROGRESS NOTES
Vascular Surgery Progress Note    S: Doing very well today.  Continues to do well with physical therapy.  Quite comfortable.    O:   Vitals:  BP  Min: 112/57  Max: 133/88  Temp  Av.2  F (36.8  C)  Min: 98.1  F (36.7  C)  Max: 98.3  F (36.8  C)  Pulse  Av.7  Min: 79  Max: 95  I/O last 3 completed shifts:  In: 200 [P.O.:200]  Out: 350 [Urine:350]    Physical Exam: Alert and appropriate.  Comfortable.  Conversant.   Left calf fasciotomy site stable.   Increasing motor and sensory function to the right foot     Initiated Coumadin with fondaparinux bridging due to heparin allergy.   INR= 1.57     Hgb= 7.3      Assessment/Plan: #1.  Very stable from vascular standpoint.  Etiology of aortic thrombus/embolus still unclear.  Coagulopathy workup negative to date.  Plan 6 months of anticoagulation and due to finances will use Coumadin.  INR goal approximately 2.5.  Will need bridging till INR closer likable.     #2.  Acute blood loss anemia.  Hemodynamically stable.  On oral iron supplements     #3.  Looking into transfer to ARU in [facility].  Has a plane flight out this afternoon but unclear at this point where there is a bed available.  This may have to delay her transfer.     working on this and have not yet heard back from the ARU.     #4.  Doing very well with physical therapy.  Weightbearing as tolerated.  Stressed importance of full flexion and extension to left foot with ischemic neuropathy which hopefully will gradually resolve and worsening improvement which is encouraging.    #5.  Fasciotomy sutures will be removed in approximately 2 weeks.  Would recommend removing every other suture initially and several days later the rest of the sutures.  Patient may shower.  Protective dressing as needed       Wm. Savanna MD

## 2024-07-30 NOTE — PROGRESS NOTES
Care Management Follow Up    Length of Stay (days): 9    Expected Discharge Date: 07/30/2024     Concerns to be Addressed: discharge planning   (Distance to get back home)  Patient plan of care discussed at interdisciplinary rounds: Yes    Anticipated Discharge Disposition: Skilled Nursing Facility     Anticipated Discharge Services:    Anticipated Discharge DME:      Patient/family educated on Medicare website which has current facility and service quality ratings: yes  Education Provided on the Discharge Plan: Yes  Patient/Family in Agreement with the Plan: yes    Referrals Placed by CM/SW:    Private pay costs discussed: Not applicable    Additional Information:  Writer had family cancel flight and cancelled transportation.     [facility] is booked solid and writer waiting to hear back from [facility].     [facility] could accept patient for rehab here in [place]    BOBBI Dhaliwal

## 2024-07-30 NOTE — CONSULTS
"SPIRITUAL HEALTH SERVICES Consult Note  [facility]  Gen Surg    Reason for visit or referral: Epic consult request for follow-up from previous visits.    Lila expressed some frustration about the possibility that the trip home might be postponed because of a delay in finding a TCU in [place]. She states that \"things are in His hands not mine anyway\". She indicates that she feels physically and emotionally ready for discharge and looks forward to returning home if not today than in the near future.    Plan: No plan for follow-up as pt expects to discharge from hospital today. SH remains available while pt is in hospital.    Edwin Thomas  Associate   [facility] Phone Line [phone]  Spiritual Health Pager [phone]    [facility] available 24/7 for emergent requests/referrals, either by paging the on-call  or by entering an ASAP/STAT consult in [facility], which will also page the on-call .     "

## 2024-07-30 NOTE — PLAN OF CARE
Goal Outcome Evaluation:    Date & Time: 7/30/24  0700 - 1300  Surgery/POD#:  07/21 Bilateral femoral cut down Aortic amerron embolectomy LLE, LL fasciotomities, L anterior tibial embolectomy and repair.  07/22 cystoscopy with retrograde pyleogram and left uteral stent placement.  07/24 fasciotomy, LE I&D and closure.   Behavior & Aggression: Green   Fall Risk: Yes   Orientation: A&Ox4   ABNL VS/O2: VSS on RA   ABNL Labs: INR 1.57, Hgb 7.3  Pain Management: Scheduled gabapentin   Bowel/Bladder: Continent of B/B, BS active,   Drains: PIV R arm removed  Wounds/incisions: L & R groin incision CDI, LLE w/ dressing & boot intact  Diet: Tolerating regular mod carb  Tests/Procedures: N/A  Activity Level: IND w/ walker   Significant Information: CMS is intact. Discharge instructions and medications reviewed with patient. Patient discharged at around 1300.    Patient does have chronic peripheral arterial disease which will continue with Lipitor management and antiplatelet therapy.

## 2024-07-30 NOTE — PROGRESS NOTES
Care Management Follow Up    Length of Stay (days): 9    Expected Discharge Date: 07/30/2024     Concerns to be Addressed: discharge planning   (Distance to get back home)  Patient plan of care discussed at interdisciplinary rounds: Yes    Anticipated Discharge Disposition: Skilled Nursing Facility     Anticipated Discharge Services:    Anticipated Discharge DME:      Patient/family educated on Medicare website which has current facility and service quality ratings: yes  Education Provided on the Discharge Plan: Yes  Patient/Family in Agreement with the Plan: yes    Referrals Placed by CM/SW:    Private pay costs discussed: Not applicable    Additional Information:  Writer does not have a TCU facility secured for discharge today and flight will most likely need to be cancelled. Patient does not have home care secured nor does she have family able to provide care for her at home.     Writer still looking for an accepting facility this morning and waiting for some calls back.      [facility] TCU is reviewing and may have a bed, Jian will call writer back     [facility] left  for OLIVER     ADD- if flight needs to be cancelled please call [facility] 6    Flight is [employer]   [phone]  [id number]    WRITER ADDED ADDITIONAL TCU REFERRALS IN THE [facility]        BOBBI Dhaliwal      What Type Of Note Output Would You Prefer (Optional)?: Bullet Format Hpi Title: Evaluation of Skin Lesions How Severe Are Your Spot(S)?: mild Have Your Spot(S) Been Treated In The Past?: has not been treated Additional History: Treated with Nitrogen from a cup 1 time prior to start of COVID-19

## 2024-07-30 NOTE — PROGRESS NOTES
Care Management Discharge Note    Discharge Date: 07/30/2024       Discharge Disposition: Skilled Nursing Facility    Discharge Services:      Discharge DME:      Discharge Transportation: family or friend will provide, car, drives self    Private pay costs discussed: Not applicable    Does the patient's insurance plan have a 3 day qualifying hospital stay waiver?  No    PAS Confirmation Code: [id number]  Patient/family educated on Medicare website which has current facility and service quality ratings: yes    Education Provided on the Discharge Plan: Yes  Persons Notified of Discharge Plans: Patient, family, facility,   Patient/Family in Agreement with the Plan: yes    Handoff Referral Completed: No    Additional Information:  Writer completed safe discharge to [facility], arranged transportation and finalized paper work. Notified family and completed    PAS-RR    D: Per DHS regulation, SW completed and submitted PAS-RR to [place] [employer]ging Direct Connect via the Senior LinkAge Line.  PAS-RR confirmation # is : [id number]    I: SW spoke with PT  and they are aware a PAS-RR has been submitted.  SW reviewed with Pt  that they may be contacted for a follow up appointment within 10 days of hospital discharge if their SNF stay is < 30 days.  Contact information for Senior [employer] Line was also provided.    A: PT  verbalized understanding.    P: Further questions may be directed to Senior LinkAge Line at #[phone], option #4 for PAS-RR staff.    BOBBI Dhaliwal

## 2024-07-30 NOTE — PLAN OF CARE
Goal Outcome Evaluation:    Date & Time: 7/29/24-7/30/24; 5413-2524  Surgery/POD#: 6 of fasciotomy  Behavior & Aggression: Green  Fall Risk: Yes  Orientation: A/O x4  ABNL VS/O2: VSS on RA  ABNL Labs:  corrected with insulin  Pain Management: prn oxy 5mg for 6/10 pain  Bowel/Bladder: Cont BB, no BM this shift  Drains: R PIV SL  Wounds/incisions: L and R groin incision CDI; LLE incision dressed and in boot  Diet: Regular, mod carb  Activity Level: SBA GBW  Tests/Procedures:  corrected with insulin  Anticipated  DC Date: today between   Significant Information:

## 2024-07-30 NOTE — PLAN OF CARE
Date & Time: 7/30/24 1285-0525  Surgery/POD#: 6 of fasciotomy  Behavior & Aggression: Green  Fall Risk: Yes  Orientation: A/Ox4  ABNL VS/O2: VSS on RA  ABNL Labs: Hgb 8.8 this am, BG checks  Pain Management: prn oxy 5mg for 6/10 pain and scheduled tylenol  Bowel/Bladder: Cont BB, no BM this shift  Drains: R PIV removed.   Wounds/incisions: L and R groin incision CDI; LLE incision dressed and in boot  Diet: Regular, mod carb  Activity Level: SBA GBW  Tests/Procedures: N/A  Anticipated  DC Date: today between 7947-8144 to [facility]   Significant Information: Discharge instructions and medications scripts faxed and sent along with patient to [facility]. Patient transported over to [facility] at approximately _____.

## 2024-07-30 NOTE — PROGRESS NOTES
Hematology-Oncology Follow-up Note  [facility]     Today's Date: 07/30/24  Date of Admission:  7/21/2024  Reason for Consult: aortic thrombus s/p thrombectomy     presented to the hospital with acute onset LLE numbness and pain requiring emergent aortic embolectomy and fasciotomies on 7/21/24.     ASSESSMENT/ PLAN :      aortic thrombus s/p thrombectomy   presented to the hospital with acute onset LLE numbness and pain requiring emergent aortic embolectomy and fasciotomies on 7/21/24.   Suspicion of provoked thrombus multifactorial- adrenal mass, infection.   Hypercoagulable work-up above is thus far negative. Waiting on remaining blood work   Continue Eliquis, will need follow up with home hematologist.   Advised patient to make appointment with hematology oncology in [place]  Patient reports she lives in [facility] is planning to go home today reports she will make appointment with hem-onc after discharge in [place]    Adrenal mass  Vascular left adrenal mass measuring 4.7 x 3.4 cm. Exact etiology is uncertain, though malignancy is certainly a consideration. Would consider MRI or biopsy for most definitive evaluation.   As above advised patient to follow-up with heme-onc [place]-patient agrees and reports she will make the appointment    Anemia  Patient is asymptomatic  Overall stable hemoglobin  Transfuse for hemoglobin less than 7  Follow-up locally with hematologist    Thrombocytosis  Follow-up locally with hematologist  MPN NGS pending  PNH negative    Patient has flight today to [place]  Hem-onc will sign off  As above patient reports she will make appointment in [place] with hem-onc    Oncology will sign off please call with questions        INTERIM HISTORY:  Patient reports she is ready to go home she has a flight today.  Patient denies pain denies cough shortness of breath chest pain     MEDICATIONS:  Reviewed         PHYSICAL EXAM:  Vital  signs:  Temp: 98.1  F (36.7  C) Temp src: Oral BP: 116/55 Pulse: 95   Resp: 15 SpO2: 98 % O2 Device: None (Room air) Oxygen Delivery: 1 LPM Height: 152.4 cm (5') Weight: 46.9 kg (103 lb 6.3 oz)  Estimated body mass index is 20.19 kg/m  as calculated from the following:    Height as of this encounter: 1.524 m (5').    Weight as of this encounter: 46.9 kg (103 lb 6.3 oz).      GENERAL/CONSTITUTIONAL: No acute distress.      LABS:  Recent Labs   Lab Test 07/30/24  0758 07/30/24  0747 07/29/24  1140 07/29/24  0630 07/24/24  1400 07/24/24  0704   NA  --   --   --   --   --  138   POTASSIUM  --   --   --  3.9   < > 5.1   CHLORIDE  --   --   --   --   --  105   CO2  --   --   --   --   --  27   ANIONGAP  --   --   --   --   --  6*   BUN  --   --   --   --   --  8.0   CR 0.71  --   --   --    < > 0.75   GLC  --  261*   < > 98   < > 135*   ARTURO  --   --   --   --   --  8.0*    < > = values in this interval not displayed.     Recent Labs   Lab Test 07/30/24  0758 07/25/24  0714 07/23/24  0709 07/22/24  0525   WBC 7.1 8.6   < > 13.3*   HGB 7.3* 7.3*   < > 7.2*   * 626*   < > 529*    98   < > 99   NEUTROPHIL  --   --   --  85    < > = values in this interval not displayed.     Recent Labs   Lab Test 07/21/24 2005 07/21/24  1255   BILITOTAL 0.2 0.3   ALKPHOS 120 131   ALT 16 18   AST 24 17   ALBUMIN 2.9* 3.1*             Willy Ceron, Nurse Practitioner   [facility]   [phone]    Chart documentation with Dragon Voice recognition Software. Although reviewed after completion, some words and grammatical errors may remain.

## 2024-07-30 NOTE — PLAN OF CARE
Physical Therapy Discharge Summary    Reason for therapy discharge:    Discharged to transitional care facility.    Progress towards therapy goal(s). See goals on Care Plan in [facility] electronic health record for goal details.  Goals not met.  Barriers to achieving goals:   discharge from facility.    Therapy recommendation(s):    Continued therapy is recommended.  Rationale/Recommendations:  To progress independence and safety with functional mobility.

## 2024-07-30 NOTE — PROGRESS NOTES
Care Management Follow Up    Length of Stay (days): 9    Expected Discharge Date: 07/30/2024     Concerns to be Addressed: discharge planning   (Distance to get back home)  Patient plan of care discussed at interdisciplinary rounds: Yes    Anticipated Discharge Disposition: Skilled Nursing Facility     Anticipated Discharge Services:    Anticipated Discharge DME:      Patient/family educated on Medicare website which has current facility and service quality ratings: yes  Education Provided on the Discharge Plan: Yes  Patient/Family in Agreement with the Plan: yes    Referrals Placed by CM/SW:    Private pay costs discussed: Not applicable    Additional Information:  [facility] has been unable to secure TCU facility and [facility] and patient is ready for discharge.     [facility] is able to accept patient into their facility today.     Writer arranged tentative transport using [employer], patient aware of costs, ride time is for between 1306 - 1351   Writer faxed [place] discharge paper work and narcotic script       BOBBI Dhaliwal

## 2024-07-31 ENCOUNTER — DOCUMENTATION ONLY (OUTPATIENT)
Dept: GERIATRICS | Facility: CLINIC | Age: 74
End: 2024-07-31

## 2024-07-31 ENCOUNTER — TRANSITIONAL CARE UNIT VISIT (OUTPATIENT)
Dept: GERIATRICS | Facility: CLINIC | Age: 74
End: 2024-07-31
Payer: MEDICARE

## 2024-07-31 DIAGNOSIS — M62.262 NONTRAUMATIC ISCHEMIC INFARCTION OF MUSCLE, LEFT LOWER LEG: ICD-10-CM

## 2024-07-31 DIAGNOSIS — E11.59 TYPE 2 DIABETES MELLITUS WITH OTHER CIRCULATORY COMPLICATIONS (H): ICD-10-CM

## 2024-07-31 DIAGNOSIS — R53.81 PHYSICAL DECONDITIONING: ICD-10-CM

## 2024-07-31 DIAGNOSIS — E27.9 ADRENAL NODULE (H): ICD-10-CM

## 2024-07-31 DIAGNOSIS — D69.6 THROMBOCYTOPENIA (H): ICD-10-CM

## 2024-07-31 DIAGNOSIS — I74.09: Primary | ICD-10-CM

## 2024-07-31 DIAGNOSIS — D62 ANEMIA DUE TO BLOOD LOSS, ACUTE: ICD-10-CM

## 2024-07-31 DIAGNOSIS — N20.0 NEPHROLITHIASIS: ICD-10-CM

## 2024-07-31 LAB
B2 GLYCOPROT1 IGG SERPL IA-ACNC: <0.8 U/ML
B2 GLYCOPROT1 IGM SERPL IA-ACNC: <2.4 U/ML
CARDIOLIPIN IGG SER IA-ACNC: <2 GPL-U/ML
CARDIOLIPIN IGG SER IA-ACNC: NEGATIVE
CARDIOLIPIN IGM SER IA-ACNC: 4 MPL-U/ML
CARDIOLIPIN IGM SER IA-ACNC: NEGATIVE

## 2024-07-31 PROCEDURE — 86481 TB AG RESPONSE T-CELL SUSP: CPT | Performed by: PHYSICIAN ASSISTANT

## 2024-07-31 PROCEDURE — 36415 COLL VENOUS BLD VENIPUNCTURE: CPT | Performed by: PHYSICIAN ASSISTANT

## 2024-07-31 PROCEDURE — P9604 ONE-WAY ALLOW PRORATED TRIP: HCPCS | Performed by: PHYSICIAN ASSISTANT

## 2024-07-31 PROCEDURE — 99310 SBSQ NF CARE HIGH MDM 45: CPT | Performed by: NURSE PRACTITIONER

## 2024-07-31 NOTE — CONFIDENTIAL NOTE
Lila Jenkins is a 74 year old  (1950), Nurse called today to report: patient on fondaparinux per bridging to coumadin, though unable to obtain fondaparinux today per pharmacy    Last INR 1.58, ordered coumadin 8mg tonight, recheck INR tomorrow     Electronically signed by:   Jermaine Dave NP

## 2024-07-31 NOTE — PROGRESS NOTES
[facility] GERIATRICS    PRIMARY CARE PROVIDER AND CLINIC:  Naeem Pulido MD, [address] / [facility] [address]  Chief Complaint   Patient presents with    Hospital F/U      [facility] Medical Record Number:  [id number]  Place of Service where encounter took place:  [facility] (TCU) [[id number]]    Lila Jenkins  is a 74 year old  (1950), admitted to the above facility from  [facility]. Hospital stay 7/21/24 through 7/30/24..   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  Urology consulted, stent placed 7/28/24, UC negative, Tx with IV abx for 10 days given degree of illness  Adrenal nodule  Incidental finding on CT of chest, noted previously in medical records, appears to be stable, recommend OP f/u with heme/onc     On exam today patient is sitting up in bed, alert, pleasant, states pain in left leg is rated 6/10, pain medications are effective, denies fever,  chills, cough, CP, palpitations, SOB, states she slept fair last night, appetite is good    CODE STATUS/ADVANCE DIRECTIVES DISCUSSION:  Prior  CPR/Full code   ALLERGIES:   Allergies   Allergen Reactions    Pentosan Polysulfate       PAST MEDICAL HISTORY: No past medical history on file.   PAST SURGICAL HISTORY:   has a past surgical history that includes Embolectomy lower extremity (Bilateral, 7/21/2024); Angiogram (7/21/2024); IR OR Angiogram (7/21/2024); Combined Cystoscopy, Insert Stent Ureter(s) (Left, 7/22/2024); and Fasciotomy lower extremity (Left, 7/24/2024).  FAMILY HISTORY: family history is not on file.  SOCIAL HISTORY:   reports that she has quit smoking. Her smoking use included cigarettes. She started smoking about 49 years ago. She has a 24.8 pack-year smoking history. She has never used smokeless tobacco.  Patient's living condition: lives alone    Post Discharge Medication Reconciliation Status:   MED REC REQUIRED  Post Medication Reconciliation Status: discharge medications reconciled and changed, per note/orders       Current Outpatient Medications   Medication Sig Dispense Refill    ALPHA LIPOIC ACID PO Take 1 capsule by mouth daily      ASHWAGANDHA PO Take 1 tablet by mouth daily      Berberine Chloride (BERBERINE HCI PO) Take 1 tablet by mouth daily      Bilberry, Vaccinium myrtillus, (BILBERRY PO) Take 15 mg by mouth daily      BIOTIN PO Take 25 mg by mouth daily      cinnamon 500 MG TABS Take 500 mg by mouth daily      COLLAGEN PO Take 500 mg by mouth 2 times daily      Echinacea 400 MG CAPS Take 400 mg by mouth daily as needed (cold symptoms)      Ginkgo Biloba (GINKOBA PO) Take 25 mg by mouth daily      lactobacillus rhamnosus, GG, (CULTURELL) capsule Take 1 capsule by mouth daily      lecithin (LECITHIN) 1200 MG CAPS capsule Take 2,400 mg by mouth daily      Moringa Oleifera (MORINGA PO) Take 1 tablet by mouth 2 times daily      aspirin 81 MG EC tablet Take 1 tablet (81 mg) by mouth daily  90 tablet 3    atorvastatin (LIPITOR) 40 MG tablet Take 1 tablet (40 mg) by mouth every evening 90 tablet 3    carboxymethylcellulose PF (REFRESH PLUS) 0.5 % ophthalmic solution Place 1 drop into both eyes 2 times daily      ferrous sulfate (FEROSUL) 325 (65 Fe) MG tablet Take 1 tablet (325 mg) by mouth every other day 30 tablet 3    fondaparinux ANTICOAGULANT (ARIXTRA) 5MG/0.4ML injection Inject 0.4 mLs (5 mg) subcutaneously at bedtime Continue until INR 2-3, then stop 4 mL 0    gabapentin (NEURONTIN) 100 MG capsule Take 1 capsule (100 mg) by mouth 3 times daily 90 capsule 3    insulin detemir (LEVEMIR PEN) 100 UNIT/ML pen Inject 12 Units subcutaneously at bedtime      metFORMIN (GLUCOPHAGE-XR) 750 MG 24 hr tablet Take 750 mg by mouth 2 times daily      multivitamin w/minerals (MULTI-VITAMIN) tablet Take 1 tablet by mouth daily      oxyCODONE (ROXICODONE) 10 MG tablet Take 0.5-1 tablets (5-10 mg) by mouth every 6 hours as needed for severe pain or moderate to severe pain (Take 5mg for moderate pain and 10mg for severe pain) 12 tablet 0    pantoprazole (PROTONIX) 40 MG EC tablet Take 1 tablet (40 mg) by mouth every morning (before breakfast) 30 tablet 3    polyethylene glycol (MIRALAX) 17 GM/Dose powder Take 17 g by mouth daily 510 g 0    traZODone (DESYREL) 150 MG tablet Take 75 mg by mouth nightly as needed for sleep      vitamin C (ASCORBIC ACID) 500 MG tablet Take 500 mg by mouth daily      vitamin E 400 units TABS Take 400 Units by mouth daily       No current facility-administered medications for this visit.       ROS:  10 point ROS of systems including Constitutional, Eyes, Respiratory, Cardiovascular, Gastroenterology, Genitourinary, Integumentary, Musculoskeletal, Psychiatric were all negative except for pertinent positives noted in my HPI.    Vitals:  /56   Pulse 82   Temp 98.3  F (36.8  C)   Resp 18   SpO2 98%   Exam:  GENERAL APPEARANCE:  Alert, in no distress, thin  ENT:  Mouth and posterior  oropharynx normal, moist mucous membranes, normal hearing acuity  EYES:  EOM, conjunctivae, lids, pupils and irises normal, PERRL  RESP:  respiratory effort and palpation of chest normal, lungs clear to auscultation , no respiratory distress  CV:  regular rate and rhythm, no murmur, rub, or gallop, peripheral edema trace+ in LLE  ABDOMEN:  normal bowel sounds, soft, nontender, no hepatosplenomegaly or other masses  M/S:   patient resting in bed, able to move all extremities independently  SKIN:  Inspection of skin and subcutaneous tissue baseline, did not visualize left leg incisions  NEURO:   speech wnl  PSYCH:  affect and mood normal    Lab/Diagnostic data:  Recent labs in [facility] reviewed by me today.  and Most Recent 3 CBC's:  Recent Labs   Lab Test 07/30/24  0758 07/25/24  0714 07/24/24  0704   WBC 7.1 8.6 8.1   HGB 7.3* 7.3* 7.4*    98 100   * 626* 645*     Most Recent 3 BMP's:  Recent Labs   Lab Test 07/30/24  1110 07/30/24  0758 07/30/24  0747 07/29/24  2112 07/29/24  1140 07/29/24  0630 07/28/24  1116 07/28/24  0644 07/27/24  0712 07/27/24  0702 07/24/24  1400 07/24/24  0704 07/23/24  0740 07/23/24  0709 07/22/24  0743 07/22/24  0525   NA  --   --   --   --   --   --   --   --   --   --   --  138  --  136  --  135   POTASSIUM  --   --   --   --   --  3.9  --  4.1  --  4.9   < > 5.1  --  4.0  4.0   < > 3.3*   CHLORIDE  --   --   --   --   --   --   --   --   --   --   --  105  --  104  --  104   CO2  --   --   --   --   --   --   --   --   --   --   --  27  --  22  --  22   BUN  --   --   --   --   --   --   --   --   --   --   --  8.0  --  8.2  --  9.7   CR  --  0.71  --   --   --   --   --   --   --  0.78  --  0.75  --  0.72  --  0.63   ANIONGAP  --   --   --   --   --   --   --   --   --   --   --  6*  --  10  --  9   ARTURO  --   --   --   --   --   --   --   --   --   --   --  8.0*  --  7.5*  --  6.6*   *  --  261* 242*   < > 98   < >  --    < >  --    < > 135*   < > 137*   < > 258*    <  > = values in this interval not displayed.       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  S/p aortic embolectomy, left anterior tibial embolectomy, bilateral iliac artery embolization and 4 compartment fasciotomies of LLE (7/21/24) with closure of the anterior leteral and posterior fasciotomy sites on 7/24/24  Plan: PT and OT, coumadin as directed INR goal of 2.5 AC recommended for 6 months (fondaparinux bridge ordered but unavailable from pharmacy)  Gabapentin 100mg TID, oxycodone 5-10mg q 6 hours prn  Coumadin 9mg today and recheck INR in AM      (D62) Anemia due to blood loss, acute  (D69.6) Thrombocytopenia (H24)  Comment: acute/ongoing  Plan: CBC follow, continue ferrous sulfate 325mg QOD    (E11.59) Type 2 diabetes mellitus with other circulatory complications (H)  Comment: acute/ongoing unconrolled  HgbA1C 10.9%  Plan: blood sugar monitoring QID, detemir 12u SQ qhs, metformin XR 750mg BID    (N20.0) Nephrolithiasis  Comment: acute/ongoing  Left nephrolithiasis, ureterolithiasis with associated moderate hydronephrosis  Stent placed 7/22/24 per urology  Tx during hospitalization with rocephin IV   Plan: f/u with Urology as directed, stent removal 3 months    (E27.8) Adrenal nodule (H24)  Comment: acute/ongoing  Plan: f/u with Heme/Onc and OP      Orders:  Coumadin 9mg today  Recheck INR in AM  CBC and BMP on Monday    Total time with patient visit: 48 minutes including discussions about the POC and care coordination with the patient. Greater than 50% of total time spent with counseling and coordinating care due to reviewed vascular surgery, hematology/oncology and internal medicine progress notes, medication  changes and lab results. Discussed pain control and POC with patient at bedside. .    Electronically signed by:  Tonya Lynn Haase, APRN CNP

## 2024-08-01 LAB
GAMMA INTERFERON BACKGROUND BLD IA-ACNC: 0 IU/ML
M TB IFN-G BLD-IMP: NEGATIVE
M TB IFN-G CD4+ BCKGRND COR BLD-ACNC: 1.48 IU/ML
MITOGEN IGNF BCKGRD COR BLD-ACNC: 0.01 IU/ML
MITOGEN IGNF BCKGRD COR BLD-ACNC: 0.03 IU/ML
QUANTIFERON MITOGEN: 1.48 IU/ML
QUANTIFERON NIL TUBE: 0 IU/ML
QUANTIFERON TB1 TUBE: 0.03 IU/ML
QUANTIFERON TB2 TUBE: 0.01

## 2024-08-02 ENCOUNTER — DOCUMENTATION ONLY (OUTPATIENT)
Dept: GERIATRICS | Facility: CLINIC | Age: 74
End: 2024-08-02
Payer: MEDICARE

## 2024-08-02 LAB
INTERPRETATION: NORMAL
SIGNIFICANT RESULTS: NORMAL
SPECIMEN DESCRIPTION: NORMAL
TEST DETAILS, MDL: NORMAL

## 2024-08-02 NOTE — CONFIDENTIAL NOTE
PHYSICIAN ORDERS    Stop lovenox  Start fondaparinux 5mg subcutaneous daily at bedtime dx aortic thrombus, continue until INR > 2.5.    Flo Morales PA-C  8/2/2024, 9:23 AM

## 2024-08-04 ENCOUNTER — ORDERS ONLY (AUTO-RELEASED) (OUTPATIENT)
Dept: MEDSURG UNIT | Facility: CLINIC | Age: 74
End: 2024-08-04
Payer: MEDICARE

## 2024-08-04 DIAGNOSIS — I74.09: ICD-10-CM

## 2024-08-05 ENCOUNTER — TRANSITIONAL CARE UNIT VISIT (OUTPATIENT)
Dept: GERIATRICS | Facility: CLINIC | Age: 74
End: 2024-08-05
Payer: MEDICARE

## 2024-08-05 VITALS
HEIGHT: 60 IN | RESPIRATION RATE: 18 BRPM | HEART RATE: 76 BPM | TEMPERATURE: 98.6 F | OXYGEN SATURATION: 97 % | WEIGHT: 105.6 LBS | DIASTOLIC BLOOD PRESSURE: 59 MMHG | SYSTOLIC BLOOD PRESSURE: 134 MMHG | BODY MASS INDEX: 20.73 KG/M2

## 2024-08-05 DIAGNOSIS — M62.262 NONTRAUMATIC ISCHEMIC INFARCTION OF MUSCLE, LEFT LOWER LEG: ICD-10-CM

## 2024-08-05 DIAGNOSIS — D62 ANEMIA DUE TO BLOOD LOSS, ACUTE: ICD-10-CM

## 2024-08-05 DIAGNOSIS — N20.0 NEPHROLITHIASIS: ICD-10-CM

## 2024-08-05 DIAGNOSIS — I74.09: Primary | ICD-10-CM

## 2024-08-05 DIAGNOSIS — E11.59 TYPE 2 DIABETES MELLITUS WITH OTHER CIRCULATORY COMPLICATIONS (H): ICD-10-CM

## 2024-08-05 DIAGNOSIS — R53.81 PHYSICAL DECONDITIONING: ICD-10-CM

## 2024-08-05 PROCEDURE — 99316 NF DSCHRG MGMT 30 MIN+: CPT | Performed by: PHYSICIAN ASSISTANT

## 2024-08-05 RX ORDER — PANTOPRAZOLE SODIUM 40 MG/1
40 TABLET, DELAYED RELEASE ORAL
Qty: 30 TABLET | Refills: 3 | Status: SHIPPED | OUTPATIENT
Start: 2024-08-05

## 2024-08-05 RX ORDER — ASPIRIN 81 MG/1
81 TABLET ORAL DAILY
Qty: 90 TABLET | Refills: 3 | Status: SHIPPED | OUTPATIENT
Start: 2024-08-05

## 2024-08-05 RX ORDER — ATORVASTATIN CALCIUM 40 MG/1
40 TABLET, FILM COATED ORAL EVERY EVENING
Qty: 90 TABLET | Refills: 3 | Status: SHIPPED | OUTPATIENT
Start: 2024-08-05

## 2024-08-05 RX ORDER — FERROUS SULFATE 325(65) MG
325 TABLET ORAL EVERY OTHER DAY
Qty: 30 TABLET | Refills: 3 | Status: SHIPPED | OUTPATIENT
Start: 2024-08-05

## 2024-08-05 RX ORDER — FONDAPARINUX SODIUM 5 MG/.4ML
5 INJECTION SUBCUTANEOUS AT BEDTIME
Qty: 4 ML | Refills: 0 | Status: SHIPPED | OUTPATIENT
Start: 2024-08-05

## 2024-08-05 RX ORDER — GABAPENTIN 100 MG/1
100 CAPSULE ORAL 3 TIMES DAILY
Qty: 90 CAPSULE | Refills: 3 | Status: SHIPPED | OUTPATIENT
Start: 2024-08-05

## 2024-08-05 RX ORDER — OXYCODONE HYDROCHLORIDE 10 MG/1
5-10 TABLET ORAL EVERY 6 HOURS PRN
Qty: 20 TABLET | Refills: 0 | Status: SHIPPED | OUTPATIENT
Start: 2024-08-05

## 2024-08-05 RX ORDER — ENOXAPARIN SODIUM 300 MG/3ML
1 INJECTION INTRAVENOUS; SUBCUTANEOUS 2 TIMES DAILY
Status: SHIPPED
Start: 2024-08-05 | End: 2024-08-05

## 2024-08-05 NOTE — PROGRESS NOTES
[facility] GERIATRICS DISCHARGE SUMMARY  PATIENT'S NAME: Lila Jenkins  YOB: 1950  MEDICAL RECORD NUMBER:  [id number]  Place of Service where encounter took place:  [facility] (TCU) [[id number]]    PRIMARY CARE PROVIDER AND CLINIC RESPONSIBLE AFTER TRANSFER:   Naeem Pulido MD, [address] / [facility] [address]    Non-FMG Provider     Transferring providers: Flo Morales PA-C, Dr Kristine MD  Recent Hospitalization/ED:  [facility] Hospital stay 7/24/2024 to 7/30/2024.  Date of SNF Admission:  7/30/24  Date of SNF (anticipated) Discharge:  8/6/24  Discharged to: previous independent home  Cognitive Scores: TBD  Physical Function: ambulating with FWW  DME: SNF  coordinating DME needs     CODE STATUS/ADVANCE DIRECTIVES DISCUSSION:  Prior   ALLERGIES: Pentosan polysulfate    NURSING FACILITY COURSE   Medication Changes/Rationale:   As noted    Summary of nursing facility stay:   Per chart review: Pt presented to an outside hospital on 7/21 with acute onset left lower extremity numbness and pain. 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 LLE with occlusion noted in below knee popliteal artery with scattered runoff seen to the LLE, no inline flow to the foot. She has bety 2B acute limb ischemia resulting from acute aortic occlusion with distal embolization to LLE tibial vessels. She was transferred to [facility] for vascular surgery consultation. Pt was taken emergently for Aortic embolectomy, left anterior tibial embolectomy, bilateral iliac artery embolization and 4 compartment fasciotomies of the LLE. The procedure was well tolerated. The patient was extubated and stable upon arrival to the ICU. The hospitalist service was consulted for medical co-management.      Her  post-operative course has been uncomplicated with good improvement in her functional status. She was started on warfarin which she will need to be on for a minimum of 6 months.     Pt is seen today for discharge planning. Resting in recliner, has continued to do well at TCU. Pain remains controlled. Planning to fly home tomorrow, understands importance of picking up medications and monitoring INR at home. Plans to make appointment with PCP for 1-2 days after returning home. Denies cp, sob. 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 Recevied PRAFO. Seen by heme/onc, workup unrevealing of thrombotic etiology.  -Continues on warfarin for AC, needs minimum 6mo, next INR 8/8  -Prescribed fondaparinux for bridging, goal INR 2.5-3.5  -PT/OT continuing  -Follow-up with PCP in [place] ASAP  -Follow-up with vascular surgery as outpatient    DMII  A1c 10.9%.  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  -Follow-up with urologist as outpatient for definitive stone management, stent removal within 3mo      Discharge Medications:  MED REC REQUIRED  Post Medication Reconciliation Status: medication reconcilation previously completed during another office visit     Current Outpatient Medications   Medication Sig Dispense Refill    enoxaparin ANTICOAGULANT (LOVENOX) 300 MG/3ML injection  Inject 0.48 mLs (48 mg) subcutaneously 2 times daily      ALPHA LIPOIC ACID PO Take 1 capsule by mouth daily      ASHWAGANDHA PO Take 1 tablet by mouth daily      aspirin 81 MG EC tablet Take 1 tablet (81 mg) by mouth daily 90 tablet 3    atorvastatin (LIPITOR) 40 MG tablet Take 1 tablet (40 mg) by mouth every evening 90 tablet 3    Berberine Chloride (BERBERINE HCI PO) Take 1 tablet by mouth daily      Bilberry, Vaccinium myrtillus, (BILBERRY PO) Take 15 mg by mouth daily      BIOTIN PO Take 25 mg by mouth daily      carboxymethylcellulose PF (REFRESH PLUS) 0.5 % ophthalmic solution Place 1 drop into both eyes 2 times daily      cinnamon 500 MG TABS Take 500 mg by mouth daily      COLLAGEN PO Take 500 mg by mouth 2 times daily      Echinacea 400 MG CAPS Take 400 mg by mouth daily as needed (cold symptoms)      ferrous sulfate (FEROSUL) 325 (65 Fe) MG tablet Take 1 tablet (325 mg) by mouth every other day 30 tablet 3    fondaparinux ANTICOAGULANT (ARIXTRA) 5MG/0.4ML injection Inject 0.4 mLs (5 mg) subcutaneously at bedtime Continue until INR 2-3, then stop 4 mL 0    gabapentin (NEURONTIN) 100 MG capsule Take 1 capsule (100 mg) by mouth 3 times daily 90 capsule 3    Ginkgo Biloba (GINKOBA PO) Take 25 mg by mouth daily      insulin detemir (LEVEMIR PEN) 100 UNIT/ML pen Inject 12 Units subcutaneously at bedtime      lactobacillus rhamnosus, GG, (CULTURELL) capsule Take 1 capsule by mouth daily      lecithin (LECITHIN) 1200 MG CAPS capsule Take 2,400 mg by mouth daily      metFORMIN (GLUCOPHAGE-XR) 750 MG 24 hr tablet Take 750 mg by mouth 2 times daily      Moringa Oleifera (MORINGA PO) Take 1 tablet by mouth 2 times daily      multivitamin w/minerals (MULTI-VITAMIN) tablet Take 1 tablet by mouth daily      oxyCODONE (ROXICODONE) 10 MG tablet Take 0.5-1 tablets (5-10 mg) by mouth every 6 hours as needed for severe pain or moderate to severe pain (Take 5mg for moderate pain and 10mg for severe pain) 12 tablet 0     pantoprazole (PROTONIX) 40 MG EC tablet Take 1 tablet (40 mg) by mouth every morning (before breakfast) 30 tablet 3    polyethylene glycol (MIRALAX) 17 GM/Dose powder Take 17 g by mouth daily 510 g 0    traZODone (DESYREL) 150 MG tablet Take 75 mg by mouth nightly as needed for sleep      vitamin C (ASCORBIC ACID) 500 MG tablet Take 500 mg by mouth daily      vitamin E 400 units TABS Take 400 Units by mouth daily         Controlled medications:   not applicable/none     Past Medical History: No past medical history on file.  Physical Exam:   Vitals: /59   Pulse 76   Temp 98.6  F (37  C)   Resp 18   Ht 1.524 m (5')   Wt 47.9 kg (105 lb 9.6 oz)   SpO2 97%   BMI 20.62 kg/m    BMI: Body mass index is 20.62 kg/m .  GEN: well-developed, well-nourished, appears comfortable  HEENT: NCAT, EOM intact bilaterally, nose & mouth patent, mucous membranes moist  CHEST: lungs CTA bilaterally, no increased work of breathing, no wheeze, crackles, rhonchi  HEART: RRR, S1 & S2, no murmur  ABD: soft, nontender, nondistended, no guarding or rigidity, +BS in all 4 quadrants  MSK: AROM bilateral UE/LE  NEURO: awake, alert. CN II-XII grossly intact.  SKIN: warm & dry without rash; dressing to LLE, no surrounding erythema/warmth    SNF labs: Recent labs in [facility] reviewed by me today.     DISCHARGE PLAN:  Follow up labs: INR due 8/8, repeat Hgb recommended in hospital follow-up  Medical Follow Up:      Follow up with primary care provider in 1-2 weeks  Follow up with specialist vascular surgery, urology   [facility] scheduled appointments:     Discharge Services: Home Care:  Occupational Therapy, Physical Therapy, Registered Nurse, Home Health Aide, and   Discharge Instructions Verbalized to Patient at Discharge:   Notify your surgeon if you have increased redness, swelling, tenderness, or drainage at your incision site.   DO NOT DRIVE while taking narcotic pain medications.     TOTAL DISCHARGE TIME:   Greater  than 30min  Electronically signed by:  Flo Morales PA-C     Documentation of Face to Face and Certification for Home Health Services    I certify that services are/were furnished while this patient was under the care of a physician and that a physician or an allowed non-physician practitioner (NPP), had a face-to-face encounter that meets the physician face-to-face encounter requirements. The encounter was in whole, or in part, related to the primary reason for home health. The patient is confined to his/her home and needs intermittent skilled nursing, physical therapy, speech-language pathology, or the continued need for occupational therapy. A plan of care has been established by a physician and is periodically reviewed by a physician.  Date of Face-to-Face Encounter: 8/5/2024.    I certify that, based on my findings, the following services are medically necessary home health services: Nursing, Occupational Therapy, Physical Therapy, and Social Work.    My clinical findings support the need for the above skilled services because: Requires assistance of another person or specialized equipment to access medical services because patient: Range of motion limitations prevents ability to exit home safely...    Patient to re-establish plan of care with their PCP within 7-10 days after leaving the facility to reestablish care.  Medicare certified PECOS provider: Flo Morales PA-C  Date: August 5, 2024

## 2024-08-19 NOTE — PHARMACY
Received call from [facility]) Anticoagulation Clinic (Lindsay Romero RN) requesting information regarding patient warfarin and fondaparinux doses and related information.    Information given.

## 2024-08-24 NOTE — DISCHARGE SUMMARY
Vascular Surgery Discharge Summary    NAME: Lila Jenkins   MRN: [id number]   : 1950     DATE OF ADMISSION: 2024     PRE/POSTOPERATIVE DIAGNOSES:    Acute limb ischemia    PROCEDURES PERFORMED, 2024:   same    INTRAOPERATIVE FINDINGS: Thrombosis and arteries.  Healthy muscle at level of fasciotomies    POSTOPERATIVE COMPLICATIONS: None     DATE OF DISCHARGE: 2024     HOSPITAL COURSE: Lila Jenkins is a 74 year old female who on 2024   underwent the above-named procedures.   she tolerated the procedure well and postoperatively was transferred to the general post-surgical unit.  The remainder of her course was essentiallly uncomplicated.  Prior to discharge, her pain was controlled well with Tylenol and narcotics.  she was able to perform ADLs and ambulate independently without difficulty, and had full return of bowel and bladder function.  On  2024, she was discharged to SNF in stable condition.    DISCHARGE INSTRUCTIONS:   Copy from AVS    FOLLOW UP APPOINTMENTS:    Copy from AVS    DISCHARGE MEDICATIONS:     Review of your medicines        START taking        Dose / Directions   polyethylene glycol 17 GM/Dose powder  Commonly known as: MIRALAX  Used for: Acute occlusion of aortic bifurcation due to thromboembolism (H)      Dose: 17 g  Take 17 g by mouth daily  Quantity: 510 g  Refills: 0            CONTINUE these medicines which have NOT CHANGED        Dose / Directions   ALPHA LIPOIC ACID PO      Dose: 1 capsule  Take 1 capsule by mouth daily  Refills: 0     ASHWAGANDHA PO      Dose: 1 tablet  Take 1 tablet by mouth daily  Refills: 0     BERBERINE HCI PO      Dose: 1 tablet  Take 1 tablet by mouth daily  Refills: 0     BILBERRY PO      Dose: 15 mg  Take 15 mg by mouth daily  Refills: 0     BIOTIN PO      Dose: 25 mg  Take 25 mg by mouth daily  Refills: 0     carboxymethylcellulose PF 0.5 % ophthalmic solution  Commonly known as: REFRESH PLUS      Dose: 1 drop  Place 1 drop into  both eyes 2 times daily  Refills: 0     cinnamon 500 MG Tabs      Dose: 500 mg  Take 500 mg by mouth daily  Refills: 0     COLLAGEN PO      Dose: 500 mg  Take 500 mg by mouth 2 times daily  Refills: 0     Echinacea 400 MG Caps      Dose: 400 mg  Take 400 mg by mouth daily as needed (cold symptoms)  Refills: 0     GINKOBA PO      Dose: 25 mg  Take 25 mg by mouth daily  Refills: 0     insulin detemir 100 UNIT/ML pen  Commonly known as: LEVEMIR PEN      Dose: 12 Units  Inject 12 Units subcutaneously at bedtime  Refills: 0     lactobacillus rhamnosus (GG) capsule      Dose: 1 capsule  Take 1 capsule by mouth daily  Refills: 0     lecithin 1200 MG Caps capsule  Commonly known as: lecithin      Dose: 2,400 mg  Take 2,400 mg by mouth daily  Refills: 0     metFORMIN 750 MG 24 hr tablet  Commonly known as: GLUCOPHAGE-XR      Dose: 750 mg  Take 750 mg by mouth 2 times daily  Refills: 0     MORINGA PO      Dose: 1 tablet  Take 1 tablet by mouth 2 times daily  Refills: 0     Multi-vitamin per tablet  Generic drug: multivitamin w/minerals      Dose: 1 tablet  Take 1 tablet by mouth daily  Refills: 0     traZODone 150 MG tablet  Commonly known as: DESYREL      Dose: 75 mg  Take 75 mg by mouth nightly as needed for sleep  Refills: 0     vitamin C 500 MG tablet  Commonly known as: ASCORBIC ACID      Dose: 500 mg  Take 500 mg by mouth daily  Refills: 0     vitamin E 400 units Tabs      Dose: 400 Units  Take 400 Units by mouth daily  Refills: 0               Where to get your medicines        These medications were sent to [facility] [place], [place] - [address] [address]  [address] [address] [address]      Phone: [phone]   polyethylene glycol 17 GM/Dose powder

## 2025-02-06 ENCOUNTER — TRANSFERRED RECORDS (OUTPATIENT)
Dept: MULTI SPECIALTY CLINIC | Facility: CLINIC | Age: 75
End: 2025-02-06

## 2025-03-19 ENCOUNTER — TRANSFERRED RECORDS (OUTPATIENT)
Dept: MULTI SPECIALTY CLINIC | Facility: CLINIC | Age: 75
End: 2025-03-19

## 2025-03-19 LAB
ALT SERPL-CCNC: 33 INTERNATIONAL_UNITS/L (ref 7–52)
AST SERPL-CCNC: 22 INTERNATIONAL_UNITS/L (ref 13–39)
CREATININE (EXTERNAL): 0.83 MG/DL (ref 0.6–1.2)
GFR ESTIMATED (EXTERNAL): 67 ML/MIN/1.73M2
GFR ESTIMATED (IF AFRICAN AMERICAN) (EXTERNAL): 82 ML/MIN/1.73M2
GLUCOSE (EXTERNAL): 302 MG/DL (ref 70–105)
POTASSIUM (EXTERNAL): 4.6 MMOL/L (ref 3.5–5.1)

## 2025-04-14 RX ORDER — CLOPIDOGREL BISULFATE 75 MG/1
75 TABLET ORAL DAILY
Status: ON HOLD | COMMUNITY
End: 2025-04-18

## 2025-04-14 NOTE — PROGRESS NOTES
PTA medications updated by Medication Scribe prior to surgery via phone call with patient (last doses completed by Nurse)     Medication history sources: Patient and Surescripts  In the past week, patient estimated taking medication this percent of the time: Greater than 90%      Significant changes made to the medication list:  None      Additional medication history information:   None    Medication reconciliation completed by provider prior to medication history? No    Time spent in this activity: 35 MINUTES    The information provided in this note is only as accurate as the sources available at the time of update(s)      Prior to Admission medications    Medication Sig Last Dose Taking? Auth Provider Long Term End Date   Alpha Lipoic Acid 200 MG CAPS Take 1 capsule by mouth daily.  Yes Unknown, Entered By History     Ashwagandha 500 MG CAPS Take 1 capsule by mouth daily.  Yes Unknown, Entered By History     aspirin 81 MG EC tablet Take 1 tablet (81 mg) by mouth daily Morning Yes Flo Morales PA-C     atorvastatin (LIPITOR) 40 MG tablet Take 1 tablet (40 mg) by mouth every evening  Yes Flo Morales PA-C Yes    Berberine Chloride 500 MG CAPS Take 1 capsule by mouth daily.  Yes Unknown, Entered By History     Bilberry, Vaccinium myrtillus, (BILBERRY PO) Take 15 mg by mouth daily  Yes Unknown, Entered By History     BIOTIN PO Take 25 mg by mouth daily  Yes Unknown, Entered By History     clopidogrel (PLAVIX) 75 MG tablet Take 75 mg by mouth daily.  Yes Reported, Patient Yes    COLLAGEN PO Take 500 mg by mouth 2 times daily  Yes Unknown, Entered By History     Echinacea 400 MG CAPS Take 400 mg by mouth daily as needed (cold symptoms)  Yes Unknown, Entered By History     Ginkgo Biloba (GINKOBA PO) Take 25 mg by mouth daily  Yes Unknown, Entered By History     insulin detemir (LEVEMIR PEN) 100 UNIT/ML pen Inject 10 Units subcutaneously at bedtime.  Yes Unknown, Entered By History Yes    lactobacillus rhamnosus, GG,  (CULTURELL) capsule Take 1 capsule by mouth daily  Yes Unknown, Entered By History     lecithin (LECITHIN) 1200 MG CAPS capsule Take 2,400 mg by mouth daily  Yes Unknown, Entered By History     metFORMIN (GLUCOPHAGE-XR) 750 MG 24 hr tablet Take 750 mg by mouth 2 times daily Evening Yes Unknown, Entered By History Yes    Moringa 500 MG CAPS Take 1 capsule by mouth 2 times daily.  Yes Unknown, Entered By History     multivitamin w/minerals (MULTI-VITAMIN) tablet Take 1 tablet by mouth daily  Yes Unknown, Entered By History     traZODone (DESYREL) 150 MG tablet Take 150 mg by mouth nightly as needed for sleep. Bedtime Yes Unknown, Entered By History Yes    vitamin C (ASCORBIC ACID) 500 MG tablet Take 500 mg by mouth daily  Yes Unknown, Entered By History     vitamin E 400 units TABS Take 400 Units by mouth daily  Yes Unknown, Entered By History     cinnamon 500 MG TABS Take 500 mg by mouth daily   Unknown, Entered By History         Medication history completed by: Tiff Briggs

## 2025-04-16 ENCOUNTER — ANESTHESIA EVENT (OUTPATIENT)
Dept: SURGERY | Facility: CLINIC | Age: 75
End: 2025-04-16
Payer: MEDICARE

## 2025-04-16 ASSESSMENT — LIFESTYLE VARIABLES: TOBACCO_USE: 1

## 2025-04-16 ASSESSMENT — ENCOUNTER SYMPTOMS: DYSRHYTHMIAS: 0

## 2025-04-17 ENCOUNTER — ANESTHESIA (OUTPATIENT)
Dept: SURGERY | Facility: CLINIC | Age: 75
End: 2025-04-17
Payer: MEDICARE

## 2025-04-17 ENCOUNTER — HOSPITAL ENCOUNTER (OUTPATIENT)
Facility: CLINIC | Age: 75
End: 2025-04-17
Attending: OBSTETRICS & GYNECOLOGY | Admitting: OBSTETRICS & GYNECOLOGY
Payer: MEDICARE

## 2025-04-17 VITALS
TEMPERATURE: 99.4 F | RESPIRATION RATE: 22 BRPM | DIASTOLIC BLOOD PRESSURE: 57 MMHG | HEART RATE: 72 BPM | HEIGHT: 60 IN | OXYGEN SATURATION: 94 % | BODY MASS INDEX: 23.71 KG/M2 | WEIGHT: 120.8 LBS | SYSTOLIC BLOOD PRESSURE: 121 MMHG

## 2025-04-17 DIAGNOSIS — R19.00 PELVIC MASS: Primary | ICD-10-CM

## 2025-04-17 PROBLEM — R97.8 ELEVATED CA 19-9 LEVEL: Status: ACTIVE | Noted: 2025-04-17

## 2025-04-17 PROBLEM — I74.09: Status: ACTIVE | Noted: 2024-07-21

## 2025-04-17 LAB
ABO + RH BLD: NORMAL
BLD GP AB SCN SERPL QL: NEGATIVE
CREAT SERPL-MCNC: 0.84 MG/DL (ref 0.51–0.95)
EGFRCR SERPLBLD CKD-EPI 2021: 73 ML/MIN/1.73M2
GLUCOSE BLDC GLUCOMTR-MCNC: 153 MG/DL (ref 70–99)
GLUCOSE BLDC GLUCOMTR-MCNC: 212 MG/DL (ref 70–99)
GLUCOSE BLDC GLUCOMTR-MCNC: 230 MG/DL (ref 70–99)
GLUCOSE BLDC GLUCOMTR-MCNC: 341 MG/DL (ref 70–99)
HGB BLD-MCNC: 13.1 G/DL (ref 11.7–15.7)
PATH REPORT.COMMENTS IMP SPEC: NORMAL
PATH REPORT.INTRAOP OBS SPEC DOC: NORMAL
SPECIMEN EXP DATE BLD: NORMAL

## 2025-04-17 PROCEDURE — 370N000017 HC ANESTHESIA TECHNICAL FEE, PER MIN: Performed by: OBSTETRICS & GYNECOLOGY

## 2025-04-17 PROCEDURE — 250N000009 HC RX 250: Performed by: OBSTETRICS & GYNECOLOGY

## 2025-04-17 PROCEDURE — 250N000012 HC RX MED GY IP 250 OP 636 PS 637: Performed by: STUDENT IN AN ORGANIZED HEALTH CARE EDUCATION/TRAINING PROGRAM

## 2025-04-17 PROCEDURE — 258N000001 HC RX 258: Performed by: OBSTETRICS & GYNECOLOGY

## 2025-04-17 PROCEDURE — 258N000003 HC RX IP 258 OP 636: Performed by: STUDENT IN AN ORGANIZED HEALTH CARE EDUCATION/TRAINING PROGRAM

## 2025-04-17 PROCEDURE — 250N000011 HC RX IP 250 OP 636: Performed by: PHYSICIAN ASSISTANT

## 2025-04-17 PROCEDURE — 85018 HEMOGLOBIN: CPT | Performed by: PHYSICIAN ASSISTANT

## 2025-04-17 PROCEDURE — 999N000141 HC STATISTIC PRE-PROCEDURE NURSING ASSESSMENT: Performed by: OBSTETRICS & GYNECOLOGY

## 2025-04-17 PROCEDURE — 88331 PATH CONSLTJ SURG 1 BLK 1SPC: CPT | Mod: TC | Performed by: OBSTETRICS & GYNECOLOGY

## 2025-04-17 PROCEDURE — 250N000011 HC RX IP 250 OP 636: Mod: JZ | Performed by: PHYSICIAN ASSISTANT

## 2025-04-17 PROCEDURE — 258N000003 HC RX IP 258 OP 636: Performed by: NURSE ANESTHETIST, CERTIFIED REGISTERED

## 2025-04-17 PROCEDURE — 88305 TISSUE EXAM BY PATHOLOGIST: CPT | Mod: TC | Performed by: OBSTETRICS & GYNECOLOGY

## 2025-04-17 PROCEDURE — 250N000011 HC RX IP 250 OP 636: Performed by: OBSTETRICS & GYNECOLOGY

## 2025-04-17 PROCEDURE — 96376 TX/PRO/DX INJ SAME DRUG ADON: CPT

## 2025-04-17 PROCEDURE — 250N000013 HC RX MED GY IP 250 OP 250 PS 637: Performed by: PHYSICIAN ASSISTANT

## 2025-04-17 PROCEDURE — 250N000009 HC RX 250: Performed by: NURSE ANESTHETIST, CERTIFIED REGISTERED

## 2025-04-17 PROCEDURE — 86901 BLOOD TYPING SEROLOGIC RH(D): CPT | Performed by: PHYSICIAN ASSISTANT

## 2025-04-17 PROCEDURE — 82565 ASSAY OF CREATININE: CPT | Performed by: PHYSICIAN ASSISTANT

## 2025-04-17 PROCEDURE — 82962 GLUCOSE BLOOD TEST: CPT

## 2025-04-17 PROCEDURE — 272N000001 HC OR GENERAL SUPPLY STERILE: Performed by: OBSTETRICS & GYNECOLOGY

## 2025-04-17 PROCEDURE — 36415 COLL VENOUS BLD VENIPUNCTURE: CPT | Performed by: PHYSICIAN ASSISTANT

## 2025-04-17 PROCEDURE — 258N000003 HC RX IP 258 OP 636: Performed by: PHYSICIAN ASSISTANT

## 2025-04-17 PROCEDURE — 44970 LAPAROSCOPY APPENDECTOMY: CPT | Mod: 52 | Performed by: STUDENT IN AN ORGANIZED HEALTH CARE EDUCATION/TRAINING PROGRAM

## 2025-04-17 PROCEDURE — 250N000025 HC SEVOFLURANE, PER MIN: Performed by: OBSTETRICS & GYNECOLOGY

## 2025-04-17 PROCEDURE — 96374 THER/PROPH/DIAG INJ IV PUSH: CPT | Mod: 59

## 2025-04-17 PROCEDURE — 250N000012 HC RX MED GY IP 250 OP 636 PS 637: Performed by: PHYSICIAN ASSISTANT

## 2025-04-17 PROCEDURE — 360N000080 HC SURGERY LEVEL 7, PER MIN: Performed by: OBSTETRICS & GYNECOLOGY

## 2025-04-17 PROCEDURE — 250N000011 HC RX IP 250 OP 636: Mod: JZ | Performed by: NURSE ANESTHETIST, CERTIFIED REGISTERED

## 2025-04-17 PROCEDURE — 710N000009 HC RECOVERY PHASE 1, LEVEL 1, PER MIN: Performed by: OBSTETRICS & GYNECOLOGY

## 2025-04-17 PROCEDURE — 250N000011 HC RX IP 250 OP 636: Mod: JZ | Performed by: STUDENT IN AN ORGANIZED HEALTH CARE EDUCATION/TRAINING PROGRAM

## 2025-04-17 PROCEDURE — 96361 HYDRATE IV INFUSION ADD-ON: CPT

## 2025-04-17 RX ORDER — HYDROMORPHONE HCL IN WATER/PF 6 MG/30 ML
0.2 PATIENT CONTROLLED ANALGESIA SYRINGE INTRAVENOUS EVERY 4 HOURS PRN
Status: DISCONTINUED | OUTPATIENT
Start: 2025-04-17 | End: 2025-04-18 | Stop reason: HOSPADM

## 2025-04-17 RX ORDER — PROPOFOL 10 MG/ML
INJECTION, EMULSION INTRAVENOUS CONTINUOUS PRN
Status: DISCONTINUED | OUTPATIENT
Start: 2025-04-17 | End: 2025-04-17

## 2025-04-17 RX ORDER — OXYCODONE HYDROCHLORIDE 5 MG/1
5 TABLET ORAL EVERY 4 HOURS PRN
Status: DISCONTINUED | OUTPATIENT
Start: 2025-04-17 | End: 2025-04-18 | Stop reason: HOSPADM

## 2025-04-17 RX ORDER — FENTANYL CITRATE 50 UG/ML
INJECTION, SOLUTION INTRAMUSCULAR; INTRAVENOUS PRN
Status: DISCONTINUED | OUTPATIENT
Start: 2025-04-17 | End: 2025-04-17

## 2025-04-17 RX ORDER — METRONIDAZOLE 500 MG/100ML
500 INJECTION, SOLUTION INTRAVENOUS
Status: COMPLETED | OUTPATIENT
Start: 2025-04-17 | End: 2025-04-17

## 2025-04-17 RX ORDER — NALOXONE HYDROCHLORIDE 0.4 MG/ML
0.4 INJECTION, SOLUTION INTRAMUSCULAR; INTRAVENOUS; SUBCUTANEOUS
Status: DISCONTINUED | OUTPATIENT
Start: 2025-04-17 | End: 2025-04-18 | Stop reason: HOSPADM

## 2025-04-17 RX ORDER — ONDANSETRON 4 MG/1
4 TABLET, ORALLY DISINTEGRATING ORAL EVERY 30 MIN PRN
Status: DISCONTINUED | OUTPATIENT
Start: 2025-04-17 | End: 2025-04-17 | Stop reason: HOSPADM

## 2025-04-17 RX ORDER — HYDROMORPHONE HCL IN WATER/PF 6 MG/30 ML
0.1 PATIENT CONTROLLED ANALGESIA SYRINGE INTRAVENOUS EVERY 4 HOURS PRN
Status: DISCONTINUED | OUTPATIENT
Start: 2025-04-17 | End: 2025-04-17

## 2025-04-17 RX ORDER — CEFAZOLIN SODIUM/WATER 2 G/20 ML
2 SYRINGE (ML) INTRAVENOUS SEE ADMIN INSTRUCTIONS
Status: DISCONTINUED | OUTPATIENT
Start: 2025-04-17 | End: 2025-04-17 | Stop reason: HOSPADM

## 2025-04-17 RX ORDER — OXYCODONE HYDROCHLORIDE 5 MG/1
10 TABLET ORAL EVERY 4 HOURS PRN
Status: DISCONTINUED | OUTPATIENT
Start: 2025-04-17 | End: 2025-04-18 | Stop reason: HOSPADM

## 2025-04-17 RX ORDER — KETOROLAC TROMETHAMINE 15 MG/ML
15 INJECTION, SOLUTION INTRAMUSCULAR; INTRAVENOUS EVERY 6 HOURS
Status: DISCONTINUED | OUTPATIENT
Start: 2025-04-17 | End: 2025-04-18 | Stop reason: HOSPADM

## 2025-04-17 RX ORDER — LIDOCAINE HYDROCHLORIDE 20 MG/ML
INJECTION, SOLUTION INFILTRATION; PERINEURAL PRN
Status: DISCONTINUED | OUTPATIENT
Start: 2025-04-17 | End: 2025-04-17

## 2025-04-17 RX ORDER — HYDROXYZINE HYDROCHLORIDE 10 MG/1
10 TABLET, FILM COATED ORAL EVERY 6 HOURS PRN
Status: DISCONTINUED | OUTPATIENT
Start: 2025-04-17 | End: 2025-04-18 | Stop reason: HOSPADM

## 2025-04-17 RX ORDER — LIDOCAINE 40 MG/G
CREAM TOPICAL
Status: DISCONTINUED | OUTPATIENT
Start: 2025-04-17 | End: 2025-04-18 | Stop reason: HOSPADM

## 2025-04-17 RX ORDER — ONDANSETRON 4 MG/1
4 TABLET, ORALLY DISINTEGRATING ORAL EVERY 6 HOURS PRN
Status: DISCONTINUED | OUTPATIENT
Start: 2025-04-17 | End: 2025-04-18 | Stop reason: HOSPADM

## 2025-04-17 RX ORDER — BUPIVACAINE HYDROCHLORIDE 5 MG/ML
INJECTION, SOLUTION EPIDURAL; INTRACAUDAL; PERINEURAL PRN
Status: DISCONTINUED | OUTPATIENT
Start: 2025-04-17 | End: 2025-04-17 | Stop reason: HOSPADM

## 2025-04-17 RX ORDER — PROPOFOL 10 MG/ML
INJECTION, EMULSION INTRAVENOUS PRN
Status: DISCONTINUED | OUTPATIENT
Start: 2025-04-17 | End: 2025-04-17

## 2025-04-17 RX ORDER — SODIUM CHLORIDE, SODIUM LACTATE, POTASSIUM CHLORIDE, CALCIUM CHLORIDE 600; 310; 30; 20 MG/100ML; MG/100ML; MG/100ML; MG/100ML
INJECTION, SOLUTION INTRAVENOUS CONTINUOUS
Status: DISCONTINUED | OUTPATIENT
Start: 2025-04-17 | End: 2025-04-17 | Stop reason: HOSPADM

## 2025-04-17 RX ORDER — SODIUM CHLORIDE 9 MG/ML
INJECTION, SOLUTION INTRAVENOUS CONTINUOUS
Status: DISCONTINUED | OUTPATIENT
Start: 2025-04-17 | End: 2025-04-18 | Stop reason: HOSPADM

## 2025-04-17 RX ORDER — FENTANYL CITRATE 0.05 MG/ML
50 INJECTION, SOLUTION INTRAMUSCULAR; INTRAVENOUS EVERY 5 MIN PRN
Status: DISCONTINUED | OUTPATIENT
Start: 2025-04-17 | End: 2025-04-17 | Stop reason: HOSPADM

## 2025-04-17 RX ORDER — ONDANSETRON 2 MG/ML
INJECTION INTRAMUSCULAR; INTRAVENOUS PRN
Status: DISCONTINUED | OUTPATIENT
Start: 2025-04-17 | End: 2025-04-17

## 2025-04-17 RX ORDER — DEXTROSE MONOHYDRATE 25 G/50ML
25-50 INJECTION, SOLUTION INTRAVENOUS
Status: DISCONTINUED | OUTPATIENT
Start: 2025-04-17 | End: 2025-04-18 | Stop reason: HOSPADM

## 2025-04-17 RX ORDER — LABETALOL HYDROCHLORIDE 5 MG/ML
10 INJECTION, SOLUTION INTRAVENOUS
Status: DISCONTINUED | OUTPATIENT
Start: 2025-04-17 | End: 2025-04-17 | Stop reason: HOSPADM

## 2025-04-17 RX ORDER — ACETAMINOPHEN 325 MG/1
975 TABLET ORAL EVERY 6 HOURS
Status: DISCONTINUED | OUTPATIENT
Start: 2025-04-17 | End: 2025-04-18 | Stop reason: HOSPADM

## 2025-04-17 RX ORDER — ONDANSETRON 2 MG/ML
4 INJECTION INTRAMUSCULAR; INTRAVENOUS EVERY 30 MIN PRN
Status: DISCONTINUED | OUTPATIENT
Start: 2025-04-17 | End: 2025-04-17 | Stop reason: HOSPADM

## 2025-04-17 RX ORDER — HYDROMORPHONE HCL IN WATER/PF 6 MG/30 ML
0.2 PATIENT CONTROLLED ANALGESIA SYRINGE INTRAVENOUS EVERY 4 HOURS PRN
Status: DISCONTINUED | OUTPATIENT
Start: 2025-04-17 | End: 2025-04-17

## 2025-04-17 RX ORDER — DEXAMETHASONE SODIUM PHOSPHATE 4 MG/ML
4 INJECTION, SOLUTION INTRA-ARTICULAR; INTRALESIONAL; INTRAMUSCULAR; INTRAVENOUS; SOFT TISSUE
Status: DISCONTINUED | OUTPATIENT
Start: 2025-04-17 | End: 2025-04-17 | Stop reason: HOSPADM

## 2025-04-17 RX ORDER — MAGNESIUM HYDROXIDE 1200 MG/15ML
LIQUID ORAL PRN
Status: DISCONTINUED | OUTPATIENT
Start: 2025-04-17 | End: 2025-04-17 | Stop reason: HOSPADM

## 2025-04-17 RX ORDER — POLYETHYLENE GLYCOL 3350 17 G/17G
17 POWDER, FOR SOLUTION ORAL DAILY PRN
Status: DISCONTINUED | OUTPATIENT
Start: 2025-04-18 | End: 2025-04-18 | Stop reason: HOSPADM

## 2025-04-17 RX ORDER — AMOXICILLIN 250 MG
1 CAPSULE ORAL 2 TIMES DAILY
Status: DISCONTINUED | OUTPATIENT
Start: 2025-04-17 | End: 2025-04-17

## 2025-04-17 RX ORDER — HEPARIN SODIUM 5000 [USP'U]/.5ML
5000 INJECTION, SOLUTION INTRAVENOUS; SUBCUTANEOUS
Status: COMPLETED | OUTPATIENT
Start: 2025-04-17 | End: 2025-04-17

## 2025-04-17 RX ORDER — ONDANSETRON 2 MG/ML
4 INJECTION INTRAMUSCULAR; INTRAVENOUS EVERY 6 HOURS PRN
Status: DISCONTINUED | OUTPATIENT
Start: 2025-04-17 | End: 2025-04-18 | Stop reason: HOSPADM

## 2025-04-17 RX ORDER — AMOXICILLIN 250 MG
1 CAPSULE ORAL 2 TIMES DAILY
Status: DISCONTINUED | OUTPATIENT
Start: 2025-04-18 | End: 2025-04-18 | Stop reason: HOSPADM

## 2025-04-17 RX ORDER — AMOXICILLIN 250 MG
1-2 CAPSULE ORAL 2 TIMES DAILY
Qty: 30 TABLET | Refills: 0 | Status: SHIPPED | OUTPATIENT
Start: 2025-04-17

## 2025-04-17 RX ORDER — PROCHLORPERAZINE MALEATE 5 MG/1
5 TABLET ORAL EVERY 6 HOURS PRN
Status: DISCONTINUED | OUTPATIENT
Start: 2025-04-17 | End: 2025-04-18 | Stop reason: HOSPADM

## 2025-04-17 RX ORDER — HYDROMORPHONE HCL IN WATER/PF 6 MG/30 ML
0.2 PATIENT CONTROLLED ANALGESIA SYRINGE INTRAVENOUS EVERY 5 MIN PRN
Status: DISCONTINUED | OUTPATIENT
Start: 2025-04-17 | End: 2025-04-17 | Stop reason: HOSPADM

## 2025-04-17 RX ORDER — BISACODYL 10 MG
10 SUPPOSITORY, RECTAL RECTAL DAILY PRN
Status: DISCONTINUED | OUTPATIENT
Start: 2025-04-17 | End: 2025-04-18 | Stop reason: HOSPADM

## 2025-04-17 RX ORDER — ACETAMINOPHEN 325 MG/1
650 TABLET ORAL EVERY 6 HOURS
Status: DISCONTINUED | OUTPATIENT
Start: 2025-04-20 | End: 2025-04-18 | Stop reason: HOSPADM

## 2025-04-17 RX ORDER — HYDROMORPHONE HYDROCHLORIDE 1 MG/ML
INJECTION, SOLUTION INTRAMUSCULAR; INTRAVENOUS; SUBCUTANEOUS PRN
Status: DISCONTINUED | OUTPATIENT
Start: 2025-04-17 | End: 2025-04-17

## 2025-04-17 RX ORDER — NICOTINE POLACRILEX 4 MG
15-30 LOZENGE BUCCAL
Status: DISCONTINUED | OUTPATIENT
Start: 2025-04-17 | End: 2025-04-18 | Stop reason: HOSPADM

## 2025-04-17 RX ORDER — HYDROMORPHONE HCL IN WATER/PF 6 MG/30 ML
0.4 PATIENT CONTROLLED ANALGESIA SYRINGE INTRAVENOUS EVERY 5 MIN PRN
Status: DISCONTINUED | OUTPATIENT
Start: 2025-04-17 | End: 2025-04-17 | Stop reason: HOSPADM

## 2025-04-17 RX ORDER — OXYCODONE HYDROCHLORIDE 5 MG/1
5-10 TABLET ORAL EVERY 4 HOURS PRN
Qty: 10 TABLET | Refills: 0 | Status: SHIPPED | OUTPATIENT
Start: 2025-04-17

## 2025-04-17 RX ORDER — CEFAZOLIN SODIUM/WATER 2 G/20 ML
2 SYRINGE (ML) INTRAVENOUS
Status: COMPLETED | OUTPATIENT
Start: 2025-04-17 | End: 2025-04-17

## 2025-04-17 RX ORDER — IBUPROFEN 600 MG/1
600 TABLET, FILM COATED ORAL EVERY 6 HOURS
Status: DISCONTINUED | OUTPATIENT
Start: 2025-04-17 | End: 2025-04-18 | Stop reason: HOSPADM

## 2025-04-17 RX ORDER — NALOXONE HYDROCHLORIDE 0.4 MG/ML
0.2 INJECTION, SOLUTION INTRAMUSCULAR; INTRAVENOUS; SUBCUTANEOUS
Status: DISCONTINUED | OUTPATIENT
Start: 2025-04-17 | End: 2025-04-18 | Stop reason: HOSPADM

## 2025-04-17 RX ORDER — ENOXAPARIN SODIUM 100 MG/ML
40 INJECTION SUBCUTANEOUS EVERY 24 HOURS
Status: DISCONTINUED | OUTPATIENT
Start: 2025-04-18 | End: 2025-04-18 | Stop reason: HOSPADM

## 2025-04-17 RX ORDER — SODIUM PHOSPHATE,MONO-DIBASIC 19G-7G/118
1 ENEMA (ML) RECTAL
Status: DISCONTINUED | OUTPATIENT
Start: 2025-04-17 | End: 2025-04-18 | Stop reason: HOSPADM

## 2025-04-17 RX ORDER — ACETAMINOPHEN 325 MG/1
975 TABLET ORAL ONCE
Status: COMPLETED | OUTPATIENT
Start: 2025-04-17 | End: 2025-04-17

## 2025-04-17 RX ORDER — ASPIRIN 81 MG/1
81 TABLET ORAL DAILY
Status: DISCONTINUED | OUTPATIENT
Start: 2025-04-18 | End: 2025-04-18 | Stop reason: HOSPADM

## 2025-04-17 RX ORDER — ATORVASTATIN CALCIUM 40 MG/1
40 TABLET, FILM COATED ORAL EVERY EVENING
Status: DISCONTINUED | OUTPATIENT
Start: 2025-04-17 | End: 2025-04-18 | Stop reason: HOSPADM

## 2025-04-17 RX ORDER — HYDROMORPHONE HYDROCHLORIDE 1 MG/ML
0.5 INJECTION, SOLUTION INTRAMUSCULAR; INTRAVENOUS; SUBCUTANEOUS EVERY 4 HOURS PRN
Status: DISCONTINUED | OUTPATIENT
Start: 2025-04-17 | End: 2025-04-18 | Stop reason: HOSPADM

## 2025-04-17 RX ORDER — NALOXONE HYDROCHLORIDE 0.4 MG/ML
0.1 INJECTION, SOLUTION INTRAMUSCULAR; INTRAVENOUS; SUBCUTANEOUS
Status: DISCONTINUED | OUTPATIENT
Start: 2025-04-17 | End: 2025-04-17 | Stop reason: HOSPADM

## 2025-04-17 RX ORDER — FENTANYL CITRATE 0.05 MG/ML
25 INJECTION, SOLUTION INTRAMUSCULAR; INTRAVENOUS EVERY 5 MIN PRN
Status: DISCONTINUED | OUTPATIENT
Start: 2025-04-17 | End: 2025-04-17 | Stop reason: HOSPADM

## 2025-04-17 RX ADMIN — INSULIN ASPART 2 UNITS: 100 INJECTION, SOLUTION INTRAVENOUS; SUBCUTANEOUS at 18:50

## 2025-04-17 RX ADMIN — SODIUM CHLORIDE, SODIUM LACTATE, POTASSIUM CHLORIDE, AND CALCIUM CHLORIDE: .6; .31; .03; .02 INJECTION, SOLUTION INTRAVENOUS at 09:46

## 2025-04-17 RX ADMIN — HYDROXYZINE HYDROCHLORIDE 10 MG: 10 TABLET, FILM COATED ORAL at 22:56

## 2025-04-17 RX ADMIN — SODIUM CHLORIDE 3 UNITS: 0.9 INJECTION, SOLUTION INTRAVENOUS at 12:31

## 2025-04-17 RX ADMIN — PHENYLEPHRINE HYDROCHLORIDE 100 MCG: 10 INJECTION INTRAVENOUS at 08:53

## 2025-04-17 RX ADMIN — KETOROLAC TROMETHAMINE 15 MG: 15 INJECTION, SOLUTION INTRAMUSCULAR; INTRAVENOUS at 16:56

## 2025-04-17 RX ADMIN — FENTANYL CITRATE 100 MCG: 50 INJECTION INTRAMUSCULAR; INTRAVENOUS at 07:52

## 2025-04-17 RX ADMIN — ACETAMINOPHEN 975 MG: 325 TABLET, FILM COATED ORAL at 16:56

## 2025-04-17 RX ADMIN — ACETAMINOPHEN 975 MG: 325 TABLET, FILM COATED ORAL at 21:58

## 2025-04-17 RX ADMIN — METRONIDAZOLE 500 MG: 500 INJECTION, SOLUTION INTRAVENOUS at 06:56

## 2025-04-17 RX ADMIN — FENTANYL CITRATE 50 MCG: 50 INJECTION, SOLUTION INTRAMUSCULAR; INTRAVENOUS at 11:30

## 2025-04-17 RX ADMIN — ROCURONIUM BROMIDE 20 MG: 50 INJECTION, SOLUTION INTRAVENOUS at 09:39

## 2025-04-17 RX ADMIN — ATORVASTATIN CALCIUM 40 MG: 40 TABLET, FILM COATED ORAL at 19:41

## 2025-04-17 RX ADMIN — HYDROMORPHONE HYDROCHLORIDE 0.5 MG: 1 INJECTION, SOLUTION INTRAMUSCULAR; INTRAVENOUS; SUBCUTANEOUS at 10:15

## 2025-04-17 RX ADMIN — OXYCODONE HYDROCHLORIDE 10 MG: 5 TABLET ORAL at 14:03

## 2025-04-17 RX ADMIN — OXYCODONE HYDROCHLORIDE 5 MG: 5 TABLET ORAL at 19:51

## 2025-04-17 RX ADMIN — ROCURONIUM BROMIDE 50 MG: 50 INJECTION, SOLUTION INTRAVENOUS at 07:53

## 2025-04-17 RX ADMIN — ACETAMINOPHEN 975 MG: 325 TABLET, FILM COATED ORAL at 06:31

## 2025-04-17 RX ADMIN — PHENYLEPHRINE HYDROCHLORIDE 100 MCG: 10 INJECTION INTRAVENOUS at 08:18

## 2025-04-17 RX ADMIN — SODIUM CHLORIDE: 0.9 INJECTION, SOLUTION INTRAVENOUS at 14:52

## 2025-04-17 RX ADMIN — LIDOCAINE HYDROCHLORIDE 100 MG: 20 INJECTION, SOLUTION INFILTRATION; PERINEURAL at 07:52

## 2025-04-17 RX ADMIN — Medication 200 MG: at 10:35

## 2025-04-17 RX ADMIN — FENTANYL CITRATE 50 MCG: 50 INJECTION, SOLUTION INTRAMUSCULAR; INTRAVENOUS at 12:05

## 2025-04-17 RX ADMIN — HEPARIN SODIUM 5000 UNITS: 5000 INJECTION, SOLUTION INTRAVENOUS; SUBCUTANEOUS at 07:00

## 2025-04-17 RX ADMIN — PROPOFOL 30 MCG/KG/MIN: 10 INJECTION, EMULSION INTRAVENOUS at 08:10

## 2025-04-17 RX ADMIN — Medication 2 G: at 07:56

## 2025-04-17 RX ADMIN — ONDANSETRON 4 MG: 2 INJECTION, SOLUTION INTRAMUSCULAR; INTRAVENOUS at 11:30

## 2025-04-17 RX ADMIN — ONDANSETRON 4 MG: 2 INJECTION INTRAMUSCULAR; INTRAVENOUS at 10:15

## 2025-04-17 RX ADMIN — ROCURONIUM BROMIDE 20 MG: 50 INJECTION, SOLUTION INTRAVENOUS at 08:41

## 2025-04-17 RX ADMIN — PROPOFOL 150 MG: 10 INJECTION, EMULSION INTRAVENOUS at 07:52

## 2025-04-17 RX ADMIN — SODIUM CHLORIDE, SODIUM LACTATE, POTASSIUM CHLORIDE, AND CALCIUM CHLORIDE: .6; .31; .03; .02 INJECTION, SOLUTION INTRAVENOUS at 06:53

## 2025-04-17 RX ADMIN — KETOROLAC TROMETHAMINE 15 MG: 15 INJECTION, SOLUTION INTRAMUSCULAR; INTRAVENOUS at 22:56

## 2025-04-17 RX ADMIN — PHENYLEPHRINE HYDROCHLORIDE 100 MCG: 10 INJECTION INTRAVENOUS at 08:09

## 2025-04-17 ASSESSMENT — ACTIVITIES OF DAILY LIVING (ADL)
ADLS_ACUITY_SCORE: 32
ADLS_ACUITY_SCORE: 55
ADLS_ACUITY_SCORE: 32
ADLS_ACUITY_SCORE: 55
ADLS_ACUITY_SCORE: 32

## 2025-04-17 NOTE — OP NOTE
Gynecologic Oncology Operative Report  2025  Lila Jenkins  [id number]    PREOPERATIVE DIAGNOSIS:   Pelvic mass   Elevated CA 19-9   Recent left lower extremity DVT requiring thrombectomy in 2024, on chronic anticoagulation     POSTOPERATIVE DIAGNOSIS:   Same    PROCEDURES:   Robotic-assisted total laparoscopic hysterectomy, bilateral salpingo-oophorectomy, pelvic washings - Dr. Suarez - Gynecologic Oncology - (Primary Surgeon)  APPENDECTOMY, ROBOT-ASSISTED, LAPAROSCOPIC, USING DA ERIC XI - Dr. Rg - General Surgery - (Primary Surgeon)    SURGEON: Gay Suarez MD    FIRST ASSISTANT: Claudia Chopra PA-C    ANESTHESIA: General endotracheal.     ESTIMATED BLOOD LOSS: 25 cc     IV FLUIDS: 1200 cc    URINE OUTPUT: 200 cc clear urine     INDICATIONS: Lila Jenkins is a 75 year old  postmenopausal female here today with right adnexal mass and elevated CA 19-9. She was referred to GYN oncology after pelvic mass was noted incidentally on a CT scan in 2024. Per her care team's report, she did have a CT scan in 2024 that did not show any abnormalities. CT scan in November noted a new right adnexal mass that was measured 5.9 x 4.5 cm. She underwent a pelvic ultrasound, which confirmed a solid ovarian mass. The mass was 4.4 x 4 cm and appeared to be new since the previous imaging. She had a left lower extremity DVT that was quite extensive and required [facility] for thrombectomy in 2024. She is on chronic anticoagulation. Plavix was held x 5 days and is on baby ASA 81 mg as bridge to surgery. She desired to proceed with definitive surgical management.    FINDINGS: On pelvic examination under anesthesia, bilateral vulva were without masses or lesions. The vaginal introitus was very narrow and difficult even placing a small V-Care uterine manipulator. The vaginal mucosa was atrophic, without any masses or lesions. The cervix was atrophic, small, and  "grossly normal. The uterus sounded to 8 cm.    On laparoscopy, there was no free fluid. Pelvic washings were collected upon entry. The uterus was normal-sized and without any abnormalities. Bilateral fallopian tubes and left ovary were grossly normal. The right ovary was replaced by solid-appearing, well-circumscribed mass that measured ~8 x 6 cm. There was no obvious-appearing normal ovarian tissue on right ovary. The right pelvic mass was removed as part of the hysterectomy specimen as \"right ovary\" and was contained within a bag. There was no intraoperative rupture or spillage of the mass and was removed intact. The bladder and posterior cul-de-sac were smooth. Bilateral ureters were noted to be vermiculating throughout the case and without evidence of hydronephrosis. There was no bulky retroperitoneal adenopathy. The peritoneal surfaces were smooth, including bilateral hemidiaphragms. The liver, stomach and omentum were without any obvious abnormalities. The small bowel, appendix and colon were normal-appearing on brief survey. Given the concern with elevated Ca 19-9 in the setting of pelvic mass, general surgery was consulted and was able to perform a robotic-assisted appendectomy.     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     Surgicell x 2 was applied to all surgical dissection sites. There was excellent hemostasis at the conclusion of the procedure. Repeat vaginal examination was performed and vaginal cuff was noted to be intact. There were no vaginal or perineal lacerations. There was no vaginal bleeding at the conclusion of the surgery.    SPECIMENS:   ID Type Source Tests Collected by Time Destination   1 : PELVIC WASHINGS Washings Pelvis NON-GYNECOLOGIC CYTOLOGY Gay Suarez MD 4/17/2025  8:36 AM    2 : appendix Tissue Appendix SURGICAL PATHOLOGY EXAM Gya Suarez MD 4/17/2025  9:17 AM    3 : " UTERUS, CERVIX, BILATERAL FALLOPIAN TUBES, BITLATERAL OVARIES Tissue Uterus, Cervix, Bilateral Fallopian Tubes & Ovaries SURGICAL PATHOLOGY EXAM Gay Suarez MD 4/17/2025  9:44 AM      COMPLICATIONS: None.    CONDITION: Stable to PACU.    OPERATIVE PROCEDURE IN DETAIL:  Consent was reviewed with the patient in the preoperative setting and confirmed. She received prophylactic antibiotics with IV Cefazolin 2 grams and IV Metronidazole 500 milligrams. In addition, she received prophylactic SQ Heparin 5,000 units and bilateral sequential compression devices for venous thromboembolism prevention. A surgical debriefing was performed with the operating room team prior to patient entry into the operating room. The patient was transferred to the operating room and placed in dorsal supine position. General anesthetic was obtained in the usual manner without noted difficulties. The patient was then positioned onto yellowfin stirrups. The patient was prepped and draped for the above-mentioned procedure.    Timeout was called at which point the patient's name, procedure and operative site was confirmed by the operative team. The umbilicus was elevated and the Veress needle introduced through the base of the umbilicus. The abdomen was insufflated with an opening pressure of 3 mmHg. The Veress needle was removed. Sites for the da Vanessa XI laparoscopic ports were demarcated, total of 5, initiating' midline approximately 4 cm above the umbilicus and positioned in a straight line around the upper abdomen. The initial midline 8 mm port was inserted without any issues. A TAP block was performed using local analgesia in 4 quadrants. The remaining 4 ports were placed under direct visualization. Left lateral ports were placed with 8 mm da Vanessa XI robotic ports x 2. Additional 8 mm da Vanessa XI robotic port was placed along medial right side x 1. The right lateral most port was 8 mm AirSeal port. CO2 insufflation was switched over  to AirSeal mode. At this point, the patient was placed into steep Trendelenburg. The pelvis was inspected as well as the upper abdomen as noted above. Bowels were packed up into the upper abdomen with gentle traction.     The Hui catheter was placed under sterile technique. A speculum was placed in the vagina and instruments for the uterine manipulator were positioned. The anterior lip of the cervix was grasped. The uterus sounded to 8 cm, and cervix was serially dilated, under direct visualization using robotic camera. The small VCare uterine manipulator was inserted and the cervical cup affixed without any difficulty. Attention was turned back to the upper abdomen. Next, the da Vanessa XI robot was docked onto the ports, and all appropriate instruments were inserted. Pelvic washings were collected and sent to surgical cytology for routine analysis.     Initially, we isolated the right round ligament, which was cauterized and transected using synchroseal device. The posterior leaf of the broad ligament was dissected. The right ureter was identified and noted to be vermiculating. A peritoneal window was made below the right infundibulopelvic ligament, well above the right ureter. The right IP ligament was multiply sealed and transected using the synchroseal device. The posterior peritoneum was dissected to the level of the posterior uterus with the right ureter in view at all times. The right external iliac artery and right external iliac vein were both identified. The same process was repeated on the left side. Some filmy adhesions of the sigmoid colon were taken down along the line of Toldt on the left side.     At this point, we initiated the hysterectomy by incising the vesicouterine peritoneum. The bladder flap was developed well at the upper vagina. The uterus had adequate mobility, bilateral uterine arteries could be isolated and these were cauterized with the synchroseal device and transected. Initially on  "the right, we extended along the cardinal and uterosacral ligaments, staying close to the cervix to the level of the upper vagina. This was cauterized and transected using the synchroseal device. Similarly, we isolated out the left cardinal ligament and uterosacral ligament which were cauterized and transected.    Dr. Rg was able to come into the case as intraoperative consultation given concern with elevated Ca 19-9 and right pelvic mass. She was able to perform robotic-assisted appendectomy. Please see her operative note for full details.    At this point, we were able to palpate the line of the small VCare cup at the level of the upper vagina in a circumferential manner. We incised along the upper vagina to resect the cervix and uterus. A 15 mm endocatch bag was inserted transvaginally and specimen placed within the bag. There was no intraoperative rupture or spillage of the mass and was removed intact. The specimen was brought out through the vaginal opening and labeled \"uterus, cervix, bilateral fallopian tubes and ovaries\" and sent to surgical pathology with intraoperative frozen section analysis requested of \"right ovary.\"    A wet laparotomy sponge was inserted to obtain adequate insufflation. The vaginal cuff had been well-developed and clearly the bladder was out of the way. The vaginal cuff was closed with running V-lock suture with excellent re-approximation.     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     The pelvis was inspected and copiously irrigated. Surgicell x 2 was placed along the pelvic dissection beds. There was excellent hemostasis at the conclusion of the procedure.    All laparoscopic instruments and ports were now removed and CO2 allowed to escape from the abdomen. The da Vanessa XI robot was undocked without incident. Skin was reapproximated with 4-0 Monocryl sutures and sterile skin glue " applied.    Claudia Chopra PA-C, was present and assisted the entire procedure. She assisted with abdominal entry, port placement, docking of robotic arms, adequate visualization, retraction, uterine manipulation, bedside assisting via assistant port, surgical specimen handling/retrieval, undocking of robotic arms, and skin closure.      The laparotomy sponge was removed from the vagina. The Hui catheter was removed from the bladder. Repeat vaginal examination was performed with findings noted above. There was no vaginal bleeding at the conclusion of the procedure.    All sponge, lap, needle and instrument counts were correct x 2. The patient tolerated the procedure well and there were no complications. She was returned to the supine position and general anesthesia was reversed without difficulty. An abdominal binder was placed on the patient. She was taken to the recovery room in stable condition. I was present and scrubbed the entire procedure.       Gay Suarez MD  Gynecologic Oncology  [facility]   [facility]  04/17/2025

## 2025-04-17 NOTE — ANESTHESIA PROCEDURE NOTES
Airway       Patient location during procedure: OR       Procedure Start/Stop Times: 4/17/2025 7:55 AM  Staff -        Performed By: anesthesiologist and CRNA  Consent for Airway        Urgency: elective  Indications and Patient Condition       Indications for airway management: juan josé-procedural       Induction type:intravenous       Mask difficulty assessment: 2 - vent by mask + OA or adjuvant +/- NMBA    Final Airway Details       Final airway type: endotracheal airway       Successful airway: ETT - single  Endotracheal Airway Details        ETT size (mm): 7.0       Cuffed: yes       Successful intubation technique: video laryngoscopy       VL Blade Size: Glidescope 3       Grade View of Cords: 1       Adjucts: stylet       Position: Right       Measured from: gums/teeth       Secured at (cm): 21       Bite block used: None    Post intubation assessment        Placement verified by: capnometry, equal breath sounds and chest rise        Number of attempts at approach: 1       Number of other approaches attempted: 0       Secured with: tape       Ease of procedure: easy       Dentition: Intact and Unchanged    Medication(s) Administered   Medication Administration Time: 4/17/2025 7:55 AM

## 2025-04-17 NOTE — PLAN OF CARE
Goal Outcome Evaluation:         Shift Summary [id number]    Admitting Diagnosis: Intra-abdominal and pelvic swelling, mass and lump, unspecified site [R19.00]  Type 2 diabetes mellitus (H) [E11.9]  Personal history of other venous thrombosis and embolism [Z86.718]   Vitals WNL  Pain 10/10. Taking oxy PRN with some relief, continues to rate pain 10/10 but states that it may be because she has a need to have a BM  A&Ox4  Voiding DTV Pure wick in place as pt was somnolent but states that she had an urge to void  Mobility not oob yet  CMS intact ex baseline numbness in LLE  Lung Sounds deminished on 2L via NC  GI Lap sites are CDI covered with abd band, abd soft but tender    Orders Placed This Encounter      Resume pre-procedure diet      Advance Diet as Tolerated: Full Liquid Diet       Plan: discharge pending.

## 2025-04-17 NOTE — PROGRESS NOTES
GYNECOLOGIC ONCOLOGY CONSULT    Patient Name: FERNANDO CHEN Patient : 1950  Patient MRN: [id number]  Referring Physician: Claudia Parrish MD  Primary GYN Oncologist: Gay Suarez (Gynecological/Oncology)  Date of Service: 2025    Reason for Consult:  Pelvic mass   Elevated CA 19-9   Recent left lower extremity DVT requiring thrombectomy in 2024, on chronic anticoagulation   Surgical planning     History of Present Illness (Gyn Oncology):  Ms. Sharp is a kyler 75 yo  postmenopausal female here today with right adnexal mass and elevated CA 19-9. She was referred to GYN oncology after pelvic mass was noted incidentally on a CT scan in 2024. Per her care team's report, she did have a CT scan in 2024 that did not show any abnormalities. CT scan in November noted a new right adnexal mass that was measured 5.9 x 4.5 cm. She underwent a pelvic ultrasound, which confirmed a solid ovarian mass. The mass was 4.4 x 4 cm and appeared to be new since the previous imaging. She had a left lower extremity DVT that was quite extensive and required lifeflight from [place] to [facility] for thrombectomy in 2024. She is on chronic anticoagulation. Plavix was held x 5 days and is on baby ASA 81 mg as bridge to surgery. Denies any postmenopausal bleeding. She had a friend from [employer] who drove her down here, named Allyson. She is okay with Allyson getting an update after surgery but do not need to tell Allyson the results of the frozen section and any more details about the surgery. She reports she will ultimately tell her sister, but her sister is going through a lot and just recently became , so, she doesn't want to stress her out with a surgeon phone call. It will be her birthday tomorrow. She would like benign pathology as a birthday present and maybe some chocolate cake, too. Denies any history of anesthesia complication. Reports she has had multiple surgeries in the past. Reports a  total of 22 and has never had any abdominal surgery. Pet therapy dog, Ramone, was present during today's visit.     Genetic Testing  N/A    Review of Systems:  A complete 14-point review of systems is negative except as noted in the above history of present illness.    Past Medical History:  Adnexal mass, right  Type 2 diabetes mellitus  UTIs  Depression  Anxiety  Arthritis  Left lower extremity DVT  ADHD    Surgical History:  Thrombectomy  Shoulder reconstruction  Elbow surgery  Left foot surgery x 4  Cataract removal  [place] tooth extraction  Cystoscopy  Carpal tunnel surgery  Knee surgery x 2    OB/Gyn History:    Postmenopausal status   No history of abnormal Pap smears     Allergies#  cefdinir and pentosan polysulfate sodium    Medications:  Aspirin Oral 81 mg tablet 1 tablet,delayed release (DR/EC) orally daily  Cinnamon Bark Oral 500 mg capsule 2 capsule orally daily  Vitamin C (Ascorbic Acid Oral) 500 mg tablet 1 tablet orally 2 times per day  Multivitamins Oral Tablet  Biotin Oral 25 mg daily  Metformin Oral 750 mg tablet 1 tablet orally 2 times per day with meals  Trazodone Oral 50 mg tablet 1.5 tablet orally daily  Bilberry Oral 15 mg daily  Tresiba FlexTouch U-100 (Insulin Degludec Subcutaneous Pen 100 unit/mL) 8 insulin pen daily  Probiotics Oral  Vitamin E Oral 400 unit orally daily  Echinacea Oral 400 mg orally as directed  Lecithin Oral 2400 mg  Lutein Oral 20 mg capsule 1 capsule orally daily    Family History:  Lung cancer: MGF    Social History:  Smoking Status : former smoker, quit 11 years ago  Alcohol consumption: none  Illicit drugs usage: none  Marital status: single  She lives alone  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     Health Maintenance:  Last Mammogram: overdue   Last Colonoscopy: has never had a colonoscopy   Last Pap smear: N/A, secondary to age, reports no history of abnormal Pap smear.    Vital Signs:  Blood pressure: 124/70, Pulse: 70,  Temperature: 96.4 F, Respirations: 16, O2 sat: 97%, Pain Scale: 0, Height: 60.62 in, Weight: 120.1 lb, BSA: 1.51, BMI: 22.98 kg/m2    Physical Exam (Gyn Oncology):  General: alert, cooperative, no acute distress  HEENT: normocephalic, trachea midline, no thyromegaly  Lungs: no labored breathing on room air.  Cardiac: regular rate and rhythm  Abdomen: soft, nondistended, nontender   Extremities: no edema  Neurologic Exam: no focal deficits  Psych: normal mood and affect  Pelvic: deferred to OR    Laboratory Data:    02/06/2025:  CEA= 3.9 ng/mL  = 10.9 U/mL  CA 19-9= 49 U/mL            Imaging:  US Pelvis and TV WWO Doppler on 02/20/2025:  FINDINGS:  Uterine dimensions: 2.4 cm x 4.4 cm x 3.6 cm  Right ovarian dimensions: 4.4 cm x 3.0 cm x 4.0 cm  Right ovarian volume: 27.8 ml  Anteverted uterus. The endometrial stripe is measured to be 2 mm thick. A small amount of fluid is seen within the  endometrial cavity. A small amount of fluid is seen within the cul-de-sac.  The left ovary is not seen. No left adnexal masses are seen.  The right ovary is abnormally enlarged and measures 4.4 cm x 4.0 cm x 2.0 cm in the greatest dimensions. A solid mass  of the right ovary measures 3.0 cm x 2.4 cm in the greatest dimensions. Color Doppler interrogation of the right ovarian  parenchyma reveals no evidence of torsion.    IMPRESSION:  1. Solid right ovarian mass could be neoplastic. Enhanced MRI of the pelvis as well as gynecological/oncologic consultsuggested.      CT Angiography Abdomen Aorta + Iliofemoral on 11/21/2024:  IMPRESSION:  1. On the CT study from 07/21/2024 at the distal abdominal aorta was occluded at the bifurcation. The abdominal aorta  is patent.  2. On the prior CT study the left popliteal artery was occluded. On the current CT study the left popliteal artery is patent  with a moderate stenosis. The left anterior tibial artery is patent. The left peroneal artery is diffusely small and appears  occluded  distally. The left posterior tibial artery is occluded. There is one-vessel runoff to the left foot.  3. There is a high-grade stenosis of the left renal artery and a moderate stenosis of the right renal artery.  4. There is atrophy of the left kidney with decreased enhancement. There are large nonobstructing calculi in the left  kidney. There is minimal left hydronephrosis.  5. In the right adnexa there is a masslike density measuring 5.9 x 4.5 x 2.8 cm. This may represent a right ovarian mass.  The left ovary is unremarkable. Pelvic ultrasound may provide additional imaging evaluation.     Problems:  Pelvic mass  History of DVT  Uncontrolled Type 2 diabetes mellitus    Assessment & Plan (Gyn Oncology):  74-year-old  postmenopausal female with solid right pelvic mass noted incidentally on CT imaging from 2024, which led to a pelvic ultrasound for further characterization, as well as elevated CA 19-9, normal CA-125 and CEA.    1. Pelvic mass, elevated CA 19-9.   We discussed the difficulty with tissue diagnosis of pelvic mass.   We also discussed the difficulty and detection of ovarian cancers. There is no effective screening strategies that currently exist.   We discussed that we do not recommend biopsy of pelvic mass in the open abdominal cavity as we run the risk of potentially seeding malignant cells within the abdominal cavity, in addition to causing pain and inflammation.   We discussed that definitive tissue diagnosis would be through surgical excision.   We discussed the need for removal of an intact pelvic mass.   We discussed that the surgical specimen would be sent off for intraoperative frozen section analysis.   We discussed the 3 overarching categories for a pelvic mass from the ovary would be benign, borderline or cancer.   We discussed in the setting of a borderline or cancer, frozen section, we will go on and perform appropriate cancer staging.   We discussed that cancer staging would  "include but, not limited to: removal of bilateral pelvic lymph nodes, removal of bilateral para-aortic lymph nodes, peritoneal staging biopsies, omentectomy, possible appendectomy.   We discussed in the event that an ovarian cancer is identified, it is a very common scenario to require adjuvant chemotherapy.   We discussed that she would be a good minimally invasive surgery candidate based on the size of the mass.   We discussed that robotic surgery can be associated with reduced blood loss, less postoperative pain and overall better postoperative recovery.   We discussed benefits, risks, and alternatives related to surgery.   Benefits include: definitive tissue diagnosis, ability to possibly stage a malignancy. This then can inform her overall prognosis and if any adjuvant therapy is needed.   Risks include, but are not limited to: bleeding, infection, failure to cure, injury to surrounding structures, postoperative pain and discomfort. If removal of lymph nodes is required for staging, this can lead to short and long-term lymphedema of lower extremities. This can be permanent and irreversible.  Alternatives do not exist that are equivalent to gold standard of care surgical excision. She desires to proceed with gold standard of care treatment.   She was scheduled for surgery (tomorrow) given her long distance to travel.   She has undergone preoperative H&P locally prior to travel to [place], [place].   She was able to meet with Chey MCKEON, our GynOnc RN, to complete additional preoperative education.   All questions answered to her satisfaction at today's visit.     2. Medical comorbidities:  Personal history of right adnexal mass, type-2 diabetes mellitus, UTIs, depression, anxiety, arthritis, DVT, ADHD  Plavix was held for 5 days. She is taking baby aspirin until day of surgery.     3. Preoperative considerations  Diagnosis Code: R19; E11.8; Z86.718  Surgery: \"robotic-assisted total laparoscopic hysterectomy, " "bilateral salpingo-oophorectomy, pelvic washings, possible cancer staging, possible appendectomy, possible conversion to laparotomy\"  Estimated Total Time: 180 minutes  Anesthesia: GENERAL  Alert Pathology for Frozen Section: YES  Bowel Prep: No  Joint Case: No  Patient Status: Overnight observation (from [place], [place])  Preoperative Clearance Visit: YES  Labs on Day of Surgery: T&S, Hgb, POC glucose  Surgical Antibiotic Prophylaxis: IV Cefazolin 2 grams + IV Metronidazole 500 milligrams  VTE Prophylaxis: SQ Heparin 5,000 units + bilateral SCDs  Additional Considerations:  1) hx of recent DVT  2) poorly controlled T2DM  3) lives long-distance away & travelling for Gyn Onc surgery  4) outside tumor markers with normal Ca-125 (10.9); normal CEA (3.9) and elevated Ca 19-9 = 49 (</= 35 normal range)    Treatment Plan and Consent  Current treatment plan of definitive surgery for pelvic mass and elevated CA 19-9 were reviewed in detail with the patient. Aware of many surgical possibilities are dependent upon frozen section analysis. See counseling above. Specifically, the counseling included: goals of the treatment, prognosis, frequency, intended benefits, associated risks/harms and side effects and medically reasonable alternatives.    I spent a total of 60 minutes in care of this patient at today's visit. I spent a total of 35 minutes in direct patient interview, examination, surgical planning, and surgical counseling. The remainder of time with 25 minutes was spent on complex care coordination with surgery planning and surgery orders placed in advance given patient's long distance to care, review of outside records and complex care coordination.     I provided the patient an opportunity to ask questions and I answered all of their treatment related questions to the best of my ability. The patient expressed understanding and consent to this plan of care.    Pain Care Management:  Pain Scale: 0      The patient and " family/friends if present were apprised of the use of [employer] remote documentation service and all parties consented to conducting the visit in this manner.    Documentation assistance provided by elena Gonsalez for Gay Suarez MD on 04/16/2025.    I, Gay Suarez MD,  personally performed the services described in this documentation, and it is both accurate and complete.        Gay Suarez MD  Phone: [phone]  Fax: [phone]    Copy to: FAX  Claudia Parrish MD (Referring)  Isa Odonnell MD                           Electronically signed by Gay Suarez MD 04/16/2025 19:23 CDT

## 2025-04-17 NOTE — ANESTHESIA POSTPROCEDURE EVALUATION
Patient: Lila Jenkins    Procedure: Procedure(s):  Robotic-assisted total laparoscopic hysterectomy, bilateral salpingo-oophorectomy, pelvic washings  APPENDECTOMY, ROBOT-ASSISTED, LAPAROSCOPIC, USING DA ERIC XI       Anesthesia Type:  General    Note:  Disposition: Admission   Postop Pain Control: Uneventful            Sign Out: Well controlled pain   PONV: No   Neuro/Psych: Uneventful            Sign Out: Acceptable/Baseline neuro status   Airway/Respiratory: Uneventful            Sign Out: Acceptable/Baseline resp. status   CV/Hemodynamics: Uneventful            Sign Out: Acceptable CV status; No obvious hypovolemia; No obvious fluid overload   Other NRE: NONE   DID A NON-ROUTINE EVENT OCCUR? No           Last vitals:  Vitals Value Taken Time   /73 04/17/25 1230   Temp 36.1  C (97  F) 04/17/25 1230   Pulse 69 04/17/25 1230   Resp 19 04/17/25 1230   SpO2 96 % 04/17/25 1230       Electronically Signed By: Zhanna Jaffe MD  April 17, 2025  2:07 PM

## 2025-04-17 NOTE — PROGRESS NOTES
VSS. A/O. Up-1. Abdo incisions CDI. Tylenol/toradol/oxy. Voiding fine, little blood tinged urine. Tolerating diet.

## 2025-04-17 NOTE — OP NOTE
General Surgery Operative Note    PREOPERATIVE DIAGNOSIS:  Elevated Ca 19-9, Right pelvic mass    POSTOPERATIVE DIAGNOSIS:  Elevated Ca 19-9, Right pelvic mass    PROCEDURE: Robotic APPENDECTOMY     ANESTHESIA:  General    SURGEON:  Los Rg MD    ASSISTANT:  None    INDICATIONS:  Lila Jenkins is a 75 year old female who presented to the Gyn/Onc clinic with Dr. Suarez for right pelvic mass and elevated Ca 19-9. I was contacted by Dr. Suarez intraoperatively to assist in an appendectomy given her presentation and concern of malignancy.     PROCEDURE: I presented to the operating room. Patient was already undergoing her planned robotic operation with Dr. Suarez. We then turned our attention to the right lower quadrant. The appendix was readily identified. It did not appear overall inflamed nor ruptured. The appendix was freed from surrounding tissue using the synchroseal. When the appendix had been freed, the synchroseal was used to make a window at the base of the appendix. The robotic 8mm surefire stapler with a blue load was brought into the field and inserted within the abdomen, and the appendix was transected at the base using a single firing. The mesoappendix was taken using synchroseal. When the appendix was then completely dissected free, it was placed in an Endo catch bag and removed through the patient's right lateral port site and passed off the field for pathology. The port was reinserted and the patient's right lower quadrant was inspected. The staple line along the cecum was found to be intact without evidence of bleeding. There was no bleeding from the mesoappendix.  The surgery was then turned back over to Dr. Suarez    ESTIMATED BLOOD LOSS:  2cc    INTRAOPERATIVE FINDINGS:  Normal appearing appendix    SPECIMENS: Appendix     DRAINS: None    Los Rg MD

## 2025-04-17 NOTE — ANESTHESIA CARE TRANSFER NOTE
Patient: Lila Jenkins    Procedure: Procedure(s):  Robotic-assisted total laparoscopic hysterectomy, bilateral salpingo-oophorectomy, pelvic washings  APPENDECTOMY, ROBOT-ASSISTED, LAPAROSCOPIC, USING DA ERIC XI       Diagnosis: Intra-abdominal and pelvic swelling, mass and lump, unspecified site [R19.00]  Type 2 diabetes mellitus (H) [E11.9]  Personal history of other venous thrombosis and embolism [Z86.718]  Diagnosis Additional Information: No value filed.    Anesthesia Type:   General     Note:    Oropharynx: oropharynx clear of all foreign objects and spontaneously breathing  Level of Consciousness: awake  Oxygen Supplementation: face mask  Level of Supplemental Oxygen (L/min / FiO2): 6  Independent Airway: airway patency satisfactory and stable  Dentition: dentition unchanged  Vital Signs Stable: post-procedure vital signs reviewed and stable  Report to RN Given: handoff report given  Patient transferred to: PACU  Comments: Pt to PACU with O2 via mask, airway patent, VSS. Report to RN.  Handoff Report: Identifed the Patient, Identified the Reponsible Provider, Reviewed the pertinent medical history, Discussed the surgical course, Reviewed Intra-OP anesthesia mangement and issues during anesthesia, Set expectations for post-procedure period and Allowed opportunity for questions and acknowledgement of understanding  Vitals:  Vitals Value Taken Time   /85 04/17/25 1040   Temp 35.9  C (96.62  F) 04/17/25 1044   Pulse 66 04/17/25 1044   Resp 18 04/17/25 1044   SpO2 100 % 04/17/25 1044   Vitals shown include unfiled device data.    Electronically Signed By: SIMI Herndon CRNA  April 17, 2025  10:45 AM

## 2025-04-18 VITALS
HEART RATE: 72 BPM | OXYGEN SATURATION: 94 % | DIASTOLIC BLOOD PRESSURE: 57 MMHG | TEMPERATURE: 98.8 F | BODY MASS INDEX: 24.84 KG/M2 | HEIGHT: 60 IN | WEIGHT: 126.54 LBS | SYSTOLIC BLOOD PRESSURE: 107 MMHG | RESPIRATION RATE: 18 BRPM

## 2025-04-18 LAB
ANION GAP SERPL CALCULATED.3IONS-SCNC: 14 MMOL/L (ref 7–15)
BUN SERPL-MCNC: 17.2 MG/DL (ref 8–23)
CALCIUM SERPL-MCNC: 7.6 MG/DL (ref 8.8–10.4)
CHLORIDE SERPL-SCNC: 100 MMOL/L (ref 98–107)
CREAT SERPL-MCNC: 0.83 MG/DL (ref 0.51–0.95)
EGFRCR SERPLBLD CKD-EPI 2021: 74 ML/MIN/1.73M2
ERYTHROCYTE [DISTWIDTH] IN BLOOD BY AUTOMATED COUNT: 12.8 % (ref 10–15)
GLUCOSE BLDC GLUCOMTR-MCNC: 204 MG/DL (ref 70–99)
GLUCOSE BLDC GLUCOMTR-MCNC: 241 MG/DL (ref 70–99)
GLUCOSE BLDC GLUCOMTR-MCNC: 246 MG/DL (ref 70–99)
GLUCOSE SERPL-MCNC: 358 MG/DL (ref 70–99)
HCO3 SERPL-SCNC: 20 MMOL/L (ref 22–29)
HCT VFR BLD AUTO: 31.9 % (ref 35–47)
HGB BLD-MCNC: 10.8 G/DL (ref 11.7–15.7)
MCH RBC QN AUTO: 32.4 PG (ref 26.5–33)
MCHC RBC AUTO-ENTMCNC: 33.9 G/DL (ref 31.5–36.5)
MCV RBC AUTO: 96 FL (ref 78–100)
PATH REPORT.COMMENTS IMP SPEC: NORMAL
PATH REPORT.COMMENTS IMP SPEC: NORMAL
PATH REPORT.FINAL DX SPEC: NORMAL
PATH REPORT.GROSS SPEC: NORMAL
PATH REPORT.MICROSCOPIC SPEC OTHER STN: NORMAL
PLATELET # BLD AUTO: 278 10E3/UL (ref 150–450)
POTASSIUM SERPL-SCNC: 3.7 MMOL/L (ref 3.4–5.3)
RBC # BLD AUTO: 3.33 10E6/UL (ref 3.8–5.2)
SODIUM SERPL-SCNC: 134 MMOL/L (ref 135–145)
WBC # BLD AUTO: 6.7 10E3/UL (ref 4–11)

## 2025-04-18 PROCEDURE — 85027 COMPLETE CBC AUTOMATED: CPT | Performed by: PHYSICIAN ASSISTANT

## 2025-04-18 PROCEDURE — 82962 GLUCOSE BLOOD TEST: CPT

## 2025-04-18 PROCEDURE — 96372 THER/PROPH/DIAG INJ SC/IM: CPT | Performed by: PHYSICIAN ASSISTANT

## 2025-04-18 PROCEDURE — 88305 TISSUE EXAM BY PATHOLOGIST: CPT | Mod: 26 | Performed by: PATHOLOGY

## 2025-04-18 PROCEDURE — 36415 COLL VENOUS BLD VENIPUNCTURE: CPT | Performed by: PHYSICIAN ASSISTANT

## 2025-04-18 PROCEDURE — 80048 BASIC METABOLIC PNL TOTAL CA: CPT | Performed by: PHYSICIAN ASSISTANT

## 2025-04-18 PROCEDURE — 250N000011 HC RX IP 250 OP 636: Mod: JZ | Performed by: PHYSICIAN ASSISTANT

## 2025-04-18 PROCEDURE — 250N000013 HC RX MED GY IP 250 OP 250 PS 637: Performed by: PHYSICIAN ASSISTANT

## 2025-04-18 PROCEDURE — 96361 HYDRATE IV INFUSION ADD-ON: CPT

## 2025-04-18 PROCEDURE — G0378 HOSPITAL OBSERVATION PER HR: HCPCS

## 2025-04-18 PROCEDURE — 96376 TX/PRO/DX INJ SAME DRUG ADON: CPT

## 2025-04-18 RX ORDER — IBUPROFEN 600 MG/1
600 TABLET, FILM COATED ORAL EVERY 6 HOURS
Qty: 20 TABLET | Refills: 0 | Status: SHIPPED | OUTPATIENT
Start: 2025-04-18

## 2025-04-18 RX ORDER — ACETAMINOPHEN 500 MG
500-1000 TABLET ORAL EVERY 6 HOURS PRN
Qty: 30 TABLET | Refills: 0 | Status: SHIPPED | OUTPATIENT
Start: 2025-04-18

## 2025-04-18 RX ADMIN — ACETAMINOPHEN 975 MG: 325 TABLET, FILM COATED ORAL at 05:11

## 2025-04-18 RX ADMIN — OXYCODONE HYDROCHLORIDE 10 MG: 5 TABLET ORAL at 07:28

## 2025-04-18 RX ADMIN — KETOROLAC TROMETHAMINE 15 MG: 15 INJECTION, SOLUTION INTRAMUSCULAR; INTRAVENOUS at 11:34

## 2025-04-18 RX ADMIN — INSULIN ASPART 3 UNITS: 100 INJECTION, SOLUTION INTRAVENOUS; SUBCUTANEOUS at 11:33

## 2025-04-18 RX ADMIN — SENNOSIDES AND DOCUSATE SODIUM 1 TABLET: 50; 8.6 TABLET ORAL at 09:05

## 2025-04-18 RX ADMIN — INSULIN ASPART 2 UNITS: 100 INJECTION, SOLUTION INTRAVENOUS; SUBCUTANEOUS at 07:29

## 2025-04-18 RX ADMIN — IBUPROFEN 600 MG: 600 TABLET ORAL at 05:11

## 2025-04-18 RX ADMIN — ACETAMINOPHEN 975 MG: 325 TABLET, FILM COATED ORAL at 11:33

## 2025-04-18 RX ADMIN — HYDROXYZINE HYDROCHLORIDE 10 MG: 10 TABLET, FILM COATED ORAL at 07:28

## 2025-04-18 RX ADMIN — ENOXAPARIN SODIUM 40 MG: 40 INJECTION SUBCUTANEOUS at 09:05

## 2025-04-18 RX ADMIN — ASPIRIN 81 MG: 81 TABLET, COATED ORAL at 09:05

## 2025-04-18 ASSESSMENT — ACTIVITIES OF DAILY LIVING (ADL)
ADLS_ACUITY_SCORE: 34
ADLS_ACUITY_SCORE: 32
ADLS_ACUITY_SCORE: 34

## 2025-04-18 NOTE — PLAN OF CARE
Date & Time: 1900-0700  Surgery/POD#: POD 1 from Robotic-assisted total laparoscopic hysterectomy, bilateral salpingo-oophorectomy, pelvic washings  APPENDECTOMY, ROBOT-ASSISTED, LAPAROSCOPIC, USING DA ERIC XI  Behavior & Aggression: Green   Fall Risk: Yes   Orientation: A&Ox4  ABNL VS/O2:VSS on RA   ABNL Labs: BS checks: 341 and 246  Pain Management: Scheduled meds, PRN oxy, and ice packs   Bowel/Bladder: Continent. Voiding adequately in BR. Passing gas, no BM. Scant vaginal bleeding noted   Drains: PIV infusing NS @ 50ml/hr  Wounds/incisions: x5 port sites CDI   Diet:Regula- denied nausea   Number of times OUT OF BED this shift : Up SBA GB. Ambulated multiple times to BR  Anticipated  DC Date: Pending

## 2025-04-18 NOTE — DISCHARGE SUMMARY
HOSPITAL DISCHARGE SUMMARY    Patient Name: Lila Jenkins  YOB: 1950 Age: 75 year old  Medical Record Number: [id number]  Primary Physician: Isa Odonnell  Phone: [phone]  Admission Date: 4/17/2025  Discharge Date: 4/18/2025    Lila Jenkins  will be discharged from [facility] to Home.    PRINCIPAL DISCHARGE DIAGNOSIS: Pelvic mass    BRIEF HOSPITAL COURSE: This 75 year old female admitted following Robotic-assisted total laparoscopic hysterectomy, bilateral salpingo-oophorectomy, pelvic washings, robotic assisted appendectomy. She tolerated the procedure well. Uneventful post operative course and discharge to home on POD #1 with adequate pain control, tolerating orals, voiding and ambulating. She was instructed to continue daily aspirin until POD #5 at which point she can resume her Plavix.     PROCEDURES PERFORMED DURING HOSPITALIZATION:   Robotic-assisted total laparoscopic hysterectomy, bilateral salpingo-oophorectomy, pelvic washings, robotic assisted appendectomy    COMPLICATIONS IN HOSPITAL: None    CONSULTATIONS:  General surgery    PERTINENT FINDINGS/RESULTS AT DISCHARGE:   /57 (BP Location: Right arm, Patient Position: Semi-Randolph's, Cuff Size: Adult Regular)   Pulse 72   Temp 99.4  F (37.4  C) (Oral)   Resp 22   Ht 1.524 m (5')   Wt 57.4 kg (126 lb 8.7 oz)   SpO2 94%   BMI 24.71 kg/m      Latest Laboratory Results:  Chem:  CBC RESULTS:   Recent Labs   Lab Test 04/18/25  0818   WBC 6.7   RBC 3.33*   HGB 10.8*   HCT 31.9*   MCV 96   MCH 32.4   MCHC 33.9   RDW 12.8        Last Basic Metabolic Panel:  Lab Results   Component Value Date     07/24/2024      Lab Results   Component Value Date    POTASSIUM 3.9 07/29/2024     Lab Results   Component Value Date    CHLORIDE 105 07/24/2024     Lab Results   Component Value Date    ARTURO 8.0 07/24/2024     Lab Results   Component Value Date    CO2 27 07/24/2024     Lab Results   Component Value Date    BUN  8.0 07/24/2024     Lab Results   Component Value Date    CR 0.84 04/17/2025     Lab Results   Component Value Date     04/18/2025         IMPORTANT PENDING TEST RESULTS:  Pathology    CONDITION AT DISCHARGE:    Stabilized    DISCHARGE ORDERS  Current Discharge Medication List        START taking these medications    Details   acetaminophen (TYLENOL) 500 MG tablet Take 1-2 tablets (500-1,000 mg) by mouth every 6 hours as needed for mild pain.  Qty: 30 tablet, Refills: 0    Associated Diagnoses: Pelvic mass      ibuprofen (ADVIL/MOTRIN) 600 MG tablet Take 1 tablet (600 mg) by mouth every 6 hours.  Qty: 20 tablet, Refills: 0    Associated Diagnoses: Pelvic mass      oxyCODONE (ROXICODONE) 5 MG tablet Take 1-2 tablets (5-10 mg) by mouth every 4 hours as needed for moderate to severe pain.  Qty: 10 tablet, Refills: 0    Associated Diagnoses: Pelvic mass      senna-docusate (SENOKOT-S/PERICOLACE) 8.6-50 MG tablet Take 1-2 tablets by mouth 2 times daily.  Qty: 30 tablet, Refills: 0    Associated Diagnoses: Pelvic mass           CONTINUE these medications which have NOT CHANGED    Details   Alpha Lipoic Acid 200 MG CAPS Take 1 capsule by mouth daily.      Ashwagandha 500 MG CAPS Take 1 capsule by mouth daily.      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      Bilberry, Vaccinium myrtillus, (BILBERRY PO) Take 15 mg by mouth daily      BIOTIN PO Take 25 mg by mouth daily      COLLAGEN PO Take 500 mg by mouth 2 times daily      Echinacea 400 MG CAPS Take 400 mg by mouth daily as needed (cold symptoms)      Ginkgo  Biloba (GINKOBA PO) Take 25 mg by mouth daily      insulin detemir (LEVEMIR PEN) 100 UNIT/ML pen Inject 10 Units subcutaneously at bedtime.      lactobacillus rhamnosus, GG, (CULTURELL) capsule Take 1 capsule by mouth daily      lecithin (LECITHIN) 1200 MG CAPS capsule Take 2,400 mg by mouth daily      metFORMIN (GLUCOPHAGE-XR) 750 MG 24 hr tablet Take 750 mg by mouth 2 times daily      Moringa 500 MG CAPS Take 1 capsule by mouth 2 times daily.      multivitamin w/minerals (MULTI-VITAMIN) tablet Take 1 tablet by mouth daily      traZODone (DESYREL) 150 MG tablet Take 150 mg by mouth nightly as needed for sleep.      vitamin C (ASCORBIC ACID) 500 MG tablet Take 500 mg by mouth daily      vitamin E 400 units TABS Take 400 Units by mouth daily      cinnamon 500 MG TABS Take 500 mg by mouth daily           STOP taking these medications       clopidogrel (PLAVIX) 75 MG tablet Comments:   Reason for Stopping:               DISCHARGE INSTRUCTION.  Your incision(s):   -It is normal for your incision to take 4-6 weeks to heal. It is important to take care of your incision.   -If you have robotic surgery, you will have 5 small incisions.  -You also have incision closing the top of the vagina.  This can takes 8 weeks to fully heal. Heavy lifting or straining can put strain on these sutures.   -A surgical glue may have been placed over your incisions. Do not pick at the glue until after 2 weeks.    -Clean your incision with soapy water, rinse, and pat dry - do not rub.   -Follow any other instructions given to you by your health care provider.   -If you notice skin irritation and/or redness around incision sites, contact your clinic, although most often hydrocortisone can be applied for minimal irritation.     Pain Relief:  -Take any prescription or over-the-counter medications as directed.   -We recommend alternating between taking Tylenol (1000mg) and Ibuprofen (600mg) every 4 hours to stay on top of the pain, unless you were  previously instructed not to take either of these medications.   -Do not take more than 4,000mg of Tylenol in a 24 hour period. Do not take more than 3200mg Ibuprofen in 24 hour period.   -A narcotic is usually prescribed post-surgery.   -A stool softener or laxative, such as Senna-S, will also be prescribed as narcotic pain medications cause constipation. It is just as important to take the stool softener or laxative as it is to take the pain medication.   -You can use ice for the first 48 hours and then begin to alternate ice packs with heat thereafter to help manage pain and discomfort.  -There will be gas used to inflate your stomach for better visualization. This can be painful for a few days following surgery as it takes time for the gas to be reabsorbed and make its way out of your body. Walking, heat and over the counter Gas-X can be used for gas related pain.   -This pain can sometimes travel to your right shoulder      Activity:  -Avoid heavy lifting, straining and sudden motion for 6 weeks.   -Weight restriction of 15lbs (one gallon of milk weighs 9lbs)  -Slowly return to your regular level of activity. Save your energy by spreading out activities that make you tired. Rest as needed.   -Rest is as important as exercise. When you feel tired, your body is telling you to rest for a while. Some days you will have more energy than others.   -There may be time when you feel  blue  or  down . This is normal. However, if these feelings last for more than two weeks, or if these feelings become more intense, you will need to call your doctor.   -No driving while you are on narcotic pain medications. You may resume driving after 1-2 weeks if you feel safe to operative a vehicle.   -Do not submerge in any type of water for 6 weeks after surgery - showering is safe  -Nothing in the vagina for 8 weeks after surgery, including sexual intercourse, tampons, and douching.      Constipation:  Constipation is a common  problem after surgery. Pain medications can make constipation worse. Prevent constipation by:  -Eat 4-5 servings of fruits or vegetables and include whole grain cereals in your diet.  -Drink 6 glasses of non-alcoholic and non-caffeinated fluids daily.   -Try taking Metamucil, Milk of Magnesia, Senokot or MiraLAX per package instructions.   -Try increasing walking as energy allows  **It is important not to strain when having a bowel movement**    When to Call Health Care Provider:  -It is normal to have vaginal bleeding for up to 8 weeks post-surgery. This bleeding can come and go and is not always consistent. If you are filling a pad within an hour, please call your clinic.  -If you have a temperature of 100.4  F or higher.   -Any new or increased pain not relieved by pain medication.  -Any signs of infection: Increasing redness, swelling, tenderness, warmth, change in appearance or increased drainage, pain or burning while you urinate, inability to urinate  -Constipation not relieved by changing your eating habits or taking laxatives.   -Any questions or concerns  -We have an on-call gynecologic oncologist available 7 days a week for urgent questions or concerns that may arise overnight or on the weekends available at [phone].      FOLLOW-UP: Lila Jenkins should see Isa Odonnell.    Specialty follow-up: as above.     AFTER HOSPITAL RECOMMENDATIONS  As above.      Physician(s) in addition to primary physician who should receive a copy:  Isa Odonnell PA-C

## 2025-04-18 NOTE — UTILIZATION REVIEW
"  Admission Status; Secondary Review Determination         Under the authority of the Utilization Management Committee, the utilization review process indicated a secondary review on the above patient.  The review outcome is based on review of the medical records, discussions with staff, and applying clinical experience noted on the date of the review.          (x) Observation Status Appropriate - This patient does not meet hospital inpatient criteria and is placed in observation status. If this patient's primary payer is Medicare and was admitted as an inpatient, Condition Code 44 should be used and patient status changed to \"observation\".     RATIONALE FOR DETERMINATION     75 year old female who underwent Robotic-assisted total laparoscopic hysterectomy, bilateral salpingo-oophorectomy, pelvic washings, and robotic assisted appendectomy on 4/17/25.  She was placed in the hospital overnight and did well postoperatively.  She was discharged to home on postop day 1 with adequate pain control, ability to void, and tolerating oral intake.  She is appropriate for observation status.    The severity of illness, intensity of service provided, expected LOS and risk for adverse outcome make the care appropriate for further observation; however, doesn't meet criteria for hospital inpatient admission.   notified of this determination.    This document was produced using voice recognition software.      The information on this document is developed by the utilization review team in order for the business office to ensure compliance.  This only denotes the appropriateness of proper admission status and does not reflect the quality of care rendered.         The definitions of Inpatient Status and Observation Status used in making the determination above are those provided in the CMS Coverage Manual, Chapter 1 and Chapter 6, section 70.4.      Sincerely,     Aleksandar Olson MD  Physician Advisor  Utilization " Management  [facility].

## 2025-04-18 NOTE — PLAN OF CARE
Pt is AOx4, discharge instructions reviewed with pt, meds given, dressings prn given, follow-up appts reviewed, IVs removed. Pt is discharged home with friends help.

## 2025-04-22 LAB
PATH REPORT.COMMENTS IMP SPEC: NORMAL
PATH REPORT.FINAL DX SPEC: NORMAL
PATH REPORT.GROSS SPEC: NORMAL
PATH REPORT.INTRAOP OBS SPEC DOC: NORMAL
PATH REPORT.MICROSCOPIC SPEC OTHER STN: NORMAL
PATH REPORT.RELEVANT HX SPEC: NORMAL
PHOTO IMAGE: NORMAL

## 2025-04-22 PROCEDURE — 88341 IMHCHEM/IMCYTCHM EA ADD ANTB: CPT | Mod: 26 | Performed by: PATHOLOGY

## 2025-04-22 PROCEDURE — 88342 IMHCHEM/IMCYTCHM 1ST ANTB: CPT | Mod: 26 | Performed by: PATHOLOGY

## 2025-04-22 PROCEDURE — 88307 TISSUE EXAM BY PATHOLOGIST: CPT | Mod: 26 | Performed by: PATHOLOGY

## 2025-04-22 PROCEDURE — 88331 PATH CONSLTJ SURG 1 BLK 1SPC: CPT | Mod: 26 | Performed by: PATHOLOGY

## 2025-04-22 PROCEDURE — 88304 TISSUE EXAM BY PATHOLOGIST: CPT | Mod: 26 | Performed by: PATHOLOGY

## 2025-06-01 NOTE — PROCEDURES
Operative Report:    Pre-op Dx: UTI, obstructing left UPJ stone  Post-op Dx: Same     Procedure:    1. Cystoscopy with left retrograde pyelogram  2. Left ureteral stent placement  3. Intraoperative interpretation of fluoroscopic images                        Surgeon: Mohit Byrd MD     Anesthesia: GEN LMA    Operative indication:  The patient has a history of recurrent UTIs and known kidney stones.  On this admission for arterial embolectomy and lower extremity fasciotomy she was found to have hydronephrosis and likely UTI.  After understanding various management options she elected to undergo today's procedure to relieve the left renal obstruction.  She understands she will need further procedure for stone management after her clinical situation stabilizes.      Description of procedure:  After properly identifying the patient and verifying informed consent, the patient was brought to the operating room in stable condition.  After induction of anesthesia the patient was placed in supine position with the right leg frog-legged.  Left leg was maintained extended due to recent fasciotomy.  Her genitalia was then prepped and draped in sterile fashion.    The case was begun by inserting a 16 Citizen of [place] flexible cystoscope into the patient's bladder under direct vision.  The bladder media was turbid.  This was irrigated and aspirated to improve visualization.  The left ureteral orifice was then identified and I passed a sensor wire up to the proximal ureter.  This stone was clearly seen on fluoroscopy.  The wire was coiling at the stone and not passing alongside.  I then attempted use of a Glidewire but again this was coiling and did not pass alongside the stone.  I used an open-ended catheter to help manipulate the wire and was ultimately able to pass a sensor wire alongside the stone and into the renal pelvis.  Contrast was injected through the open-ended catheter to perform retrograde pyelogram.  There is  moderate left hydronephrosis.  There were no filling defects seen.  I then returned the sensor wire and placed a 6 [place] by 24 cm double-J ureteral stent over the wire.  Excellent proximal and distal coils were visualized.     A Hui catheter was then replaced.  The patient was then awoke from anesthesia and brought to recovery in stable condition.     EBL: None     Findings: Left ureteral stent placement without difficulty, large obstructing left UPJ stone     Drains: 6 [place] by 24 cm left ureteral stent Hui    Specimens: None     Complications: None     Follow-up: Stent can remain in place for now while her clinical condition stabilizes.  She can follow-up with her local urologist in [facility] for further stone management.  She understands the stent is not permanent and her stones should be treated and the stent removed or exchanged within 3 months.    Mohit Byrd MD  8:49 PM 07/22/24       Postpartum Depression:  Postpartum depression goes beyond \"baby blues\" and is persistent, intense, and severe.  The most common symptom is a feeling of deep sadness.  You may also feel as if you just can't cope with life.  Other symptoms include:  thoughts of harming yourself or the  baby  lack of interest in the baby, family, or friends  feeling guilty or worthless  feeling hopeless  uncontrollable crying  feelings of being a bad mother  trouble sleeping, oversleeping or feeling tired all the time  changes in appetite  strong feelings of irritability, anger, or nervousness  having trouble thinking clearly or making decisions  having headaches, aches and pains, or stomach problems that won't go away  thinking about death or suicide    The exact cause of postpartum depression is unknown. Changes in brain chemistry or structure are believed to play a big role in depression. It may be due to changes in your hormones during and after childbirth. You may also be tired from caring for your baby and adjusting to being a mother. All these factors may make you feel depressed. In some cases, your genes may also play a role.    Postpartum depression can be treated in many ways.  Talking to your healthcare provider is the first step toward feeling better.    Call your doctor or midwife immediately if you:  Cry for no clear reason  Have trouble sleeping, eating, and making choices  Question whether you can handle caring for your baby  Have intense feelings of sadness, anxiety, or despair that prevent you from being able to do your daily tasks    Here are some additional resources offering support for Postpartum Depression:    Postpartum Support International: (361) 332-3608   Mom's Mental Health Initiative ([facility]) https://momsmentalhealthmke.org/  Mental Health [facility] (373) 238-6714 or (149) 292-1523   National [place] for Mental Health: (237) 905-1002  National Suicide Prevention (085) 898-7477  Postpartum Progress https://postpartumproFresh [place].07/03/2006    Hib (PRP-T) 04/03/2006    IPV 10/07/2009    Influenza, live, quadrivalent, intranasal 10/02/2014    Influenza, live, trivalent, intranasal 01/23/2012    Influenza, split virus, trivalent 01/03/2008    Influenza, split virus, trivalent, PF 11/15/2006    MMR 10/07/2009    Meningococcal Conjugate MCV4O 05/18/2022    Meningococcal MCV4, Unspecified Formulation 08/16/2016    Pneumococcal Conjugate 7 Valent 07/03/2006    Pneumococcal conjugate PCV 13 10/07/2009    Tdap 08/16/2016    Varicella 10/07/lactation support at [facility], contact [phone].    [email or web address]

## (undated) DEVICE — WIPES FOLEY CARE SURESTEP PROVON DFC100

## (undated) DEVICE — SOL WATER IRRIG 3000ML BAG 2B7117

## (undated) DEVICE — PAD CHUX UNDERPAD 23X24" 7136

## (undated) DEVICE — DAVINCI XI DRAPE ARM 470015

## (undated) DEVICE — SPONGE RAY-TEC 4X4" 7317

## (undated) DEVICE — PACK SPECIAL PROCEDURE CUSTOM

## (undated) DEVICE — DAVINCI XI MONOPOLAR SCISSORS HOT SHEARS 8MM 470179

## (undated) DEVICE — SOL NACL 0.9% IRRIG 1000ML BOTTLE 2F7124

## (undated) DEVICE — CLIP APPLIER 09 3/8" SM LIGACLIP MCS20

## (undated) DEVICE — LINEN TOWEL PACK X5 5464

## (undated) DEVICE — CLEANER INST PRE-KLENZ SOAK SHIELD TUBE 6 ML MEDIUM 2D66J4

## (undated) DEVICE — CATH TRAY FOLEY COUDE SURESTEP 16FR W/URNE MTR STLK A304716A

## (undated) DEVICE — ANTIFOG SOLUTION SEE SHARP 150M TROCAR SWABS 30978 (COI)

## (undated) DEVICE — NDL INSUFFLATION 13GA 120MM C2201

## (undated) DEVICE — SYR 10ML FINGER CONTROL W/O NDL 309695

## (undated) DEVICE — DRSG XEROFORM 5X9" 8884431605

## (undated) DEVICE — TUBING CONMED AIRSEAL SMOKE EVAC INSUFFLATION ASM-EVAC

## (undated) DEVICE — RAD RX ISOVUE 300 (50ML) 61% IOPAMIDOL CHARGE PER ML

## (undated) DEVICE — SU SILK 0 24" TIE SA76G

## (undated) DEVICE — DRAPE IOBAN INCISE 23X17" 6650EZ

## (undated) DEVICE — DRAPE EXTREMITY BILAT

## (undated) DEVICE — ESU GROUND PAD UNIVERSAL W/O CORD

## (undated) DEVICE — RETR ELEV / UTERINE MANIPULATOR V-CARE SM CUP 60-6085-200

## (undated) DEVICE — PACK AAA SBA15AAFS3

## (undated) DEVICE — SU PROLENE 7-0 BV-1DA 24" 8702H

## (undated) DEVICE — POUCH TISSUE RETRIEVAL 15MM 6.00" INTRO TRS190SB2

## (undated) DEVICE — DAVINCI XI ESU FCP BIPOLAR MARYLAND 470172

## (undated) DEVICE — GLOVE BIOGEL PI MICRO SZ 6.5 48565

## (undated) DEVICE — SUCTION IRR SYSTEM W/O TIP INTERPULSE HANDPIECE 0210-100-000

## (undated) DEVICE — SU PROLENE 5-0 RB-1DA 36"  8556H

## (undated) DEVICE — CATH URETERAL TIGERTAIL 05FR 70CM 139005

## (undated) DEVICE — SU PROLENE 6-0 C-1DA 30" 8706H

## (undated) DEVICE — SOL NACL 0.9% INJ 1000ML BAG 2B1324X

## (undated) DEVICE — BAG DRAIN URO FOR SIEMENS 8MM ADAPTER NS CC164NS-A

## (undated) DEVICE — DAVINCI HOT SHEARS TIP COVER  400180

## (undated) DEVICE — SU SILK 4-0 TIE 12X30" A303H

## (undated) DEVICE — GLOVE BIOGEL PI MICRO INDICATOR UNDERGLOVE SZ 6.5 48965

## (undated) DEVICE — CATH URETERAL OPEN END 6FR AXXCESS

## (undated) DEVICE — SU PROLENE 6-0 BV-1DA 18" 8709H

## (undated) DEVICE — SUCTION IRR STRYKERFLOW II W/TIP 250-070-520

## (undated) DEVICE — SU WND CLOSURE VLOC 180 ABS 0 12" GS-21 VLOCL0316

## (undated) DEVICE — CATH FOGARTY EMBOLECTOMY 3FR 80CM LATEX 120803FP

## (undated) DEVICE — DAVINCI XI TISSUE SEALER SYNCROSEAL 8MM 480440

## (undated) DEVICE — SYR 50ML LL W/O NDL 309653

## (undated) DEVICE — SYR 20ML LL W/O NDL 302830

## (undated) DEVICE — DRAPE GYN/UROLOGY FLUID POUCH TUR 29455

## (undated) DEVICE — DAVINCI XI OBTURATOR BLADELESS 8MM 470359

## (undated) DEVICE — PACK MINOR SBA15MIFSE

## (undated) DEVICE — PACK TUR CUSTOM SBA15RUFSE

## (undated) DEVICE — RAD INTRODUCER KIT MICRO 5FRX10CM .018 NITINOL G/W

## (undated) DEVICE — CATH TRAY FOLEY SURESTEP 16FR W/URNE MTR STLK LATEX A303316A

## (undated) DEVICE — SU MONOCRYL 4-0 PS-2 18" UND Y496G

## (undated) DEVICE — DAVINCI XI DRAPE COLUMN 470341

## (undated) DEVICE — INTRO SHEATH 7FRX10CM PINNACLE MARKER RSB712

## (undated) DEVICE — BNDG ABDOMINAL BINDER 9X30-45" 79-89070

## (undated) DEVICE — DRAPE MAYO STAND 23X54 8337

## (undated) DEVICE — DRAPE EXTREMITY W/ARMBOARD 29405

## (undated) DEVICE — GUIDEWIRE ZIPWIRE NITINOL STR 0.035"X150CM M0066802050

## (undated) DEVICE — SOL WATER IRRIG 1000ML BOTTLE 2F7114

## (undated) DEVICE — DAVINCI XI SEAL UNIVERSAL 5-12MM 470500

## (undated) DEVICE — STPL DAVINCI SUREFORM 30MM RELOAD BLUE 48230B

## (undated) DEVICE — DAVINCI XI NDL DRIVER LARGE 470006

## (undated) DEVICE — KIT PATIENT POSITIONING PIGAZZI LATEX FREE 40580

## (undated) DEVICE — DECANTER BAG 2002S

## (undated) DEVICE — SU SILK 2-0 TIE 24" SA75H

## (undated) DEVICE — GUIDEWIRE SENSOR DUAL FLEX STR 0.035"X150CM M0066703080

## (undated) DEVICE — PREP CHLORAPREP 26ML TINTED HI-LITE ORANGE 930815

## (undated) DEVICE — DAVINCI XI GRASPER ENDOWRIST PROGRASP 470093

## (undated) DEVICE — DRAPE CV SPLIT II 147X106" 9158

## (undated) DEVICE — CATH FOGARTY EMBOLECTOMY 6FR 80CM LATEX 120806FP

## (undated) DEVICE — STPL DAVINCI SUREFORM 30X8MM 488530

## (undated) DEVICE — BONE CLEANING TIP INTERPULSE  0210-010-000

## (undated) DEVICE — POUCH TISSUE RETRIEVAL ROBOTIC 8MM 5.1" INTRO TRS-ROBO-8

## (undated) DEVICE — LINEN TOWEL PACK X6 WHITE 5487

## (undated) DEVICE — DRAPE LEGGINGS 8421

## (undated) DEVICE — CATH TRAY FOLEY SURESTEP 16FR WDRAIN BAG STLK LATEX A300316A

## (undated) DEVICE — SURGICEL POWDER ABSORBABLE HEMOSTAT 3GM 3013SP

## (undated) DEVICE — PACK DAVINCI GYN SMA15GDFS1

## (undated) DEVICE — GLOVE BIOGEL PI ULTRATOUCH SZ 7.5 41175

## (undated) DEVICE — SU ETHILON 3-0 FS-1 18" 663G

## (undated) DEVICE — GLOVE PROTEXIS BLUE W/NEU-THERA 6.5  2D73EB65

## (undated) DEVICE — SU SILK 3-0 TIE 24" SA74H

## (undated) DEVICE — LIGHT HANDLE X2

## (undated) DEVICE — ENDO TROCAR CONMED AIRSEAL BLADELESS 08X120MM IAS8-120LP

## (undated) DEVICE — APPLICATOR ENDOSCOPIC 5 SURGICEL POWDER 3123SPEA

## (undated) RX ORDER — FENTANYL CITRATE 50 UG/ML
INJECTION, SOLUTION INTRAMUSCULAR; INTRAVENOUS
Status: DISPENSED
Start: 2024-07-24

## (undated) RX ORDER — ONDANSETRON 2 MG/ML
INJECTION INTRAMUSCULAR; INTRAVENOUS
Status: DISPENSED
Start: 2024-07-22

## (undated) RX ORDER — PROPOFOL 10 MG/ML
INJECTION, EMULSION INTRAVENOUS
Status: DISPENSED
Start: 2025-04-17

## (undated) RX ORDER — GINSENG 100 MG
CAPSULE ORAL
Status: DISPENSED
Start: 2024-07-24

## (undated) RX ORDER — ONDANSETRON 2 MG/ML
INJECTION INTRAMUSCULAR; INTRAVENOUS
Status: DISPENSED
Start: 2025-04-17

## (undated) RX ORDER — HYDROMORPHONE HYDROCHLORIDE 1 MG/ML
INJECTION, SOLUTION INTRAMUSCULAR; INTRAVENOUS; SUBCUTANEOUS
Status: DISPENSED
Start: 2025-04-17

## (undated) RX ORDER — FENTANYL CITRATE 50 UG/ML
INJECTION, SOLUTION INTRAMUSCULAR; INTRAVENOUS
Status: DISPENSED
Start: 2024-07-21

## (undated) RX ORDER — FENTANYL CITRATE 0.05 MG/ML
INJECTION, SOLUTION INTRAMUSCULAR; INTRAVENOUS
Status: DISPENSED
Start: 2025-04-17

## (undated) RX ORDER — HYDROMORPHONE HYDROCHLORIDE 1 MG/ML
INJECTION, SOLUTION INTRAMUSCULAR; INTRAVENOUS; SUBCUTANEOUS
Status: DISPENSED
Start: 2024-07-21

## (undated) RX ORDER — EPHEDRINE SULFATE 50 MG/ML
INJECTION, SOLUTION INTRAMUSCULAR; INTRAVENOUS; SUBCUTANEOUS
Status: DISPENSED
Start: 2024-07-22

## (undated) RX ORDER — HEPARIN SODIUM 5000 [USP'U]/.5ML
INJECTION, SOLUTION INTRAVENOUS; SUBCUTANEOUS
Status: DISPENSED
Start: 2025-04-17

## (undated) RX ORDER — OXYTOCIN/0.9 % SODIUM CHLORIDE 30/500 ML
PLASTIC BAG, INJECTION (ML) INTRAVENOUS
Status: DISPENSED
Start: 2025-04-17

## (undated) RX ORDER — ONDANSETRON 2 MG/ML
INJECTION INTRAMUSCULAR; INTRAVENOUS
Status: DISPENSED
Start: 2024-07-24

## (undated) RX ORDER — PROPOFOL 10 MG/ML
INJECTION, EMULSION INTRAVENOUS
Status: DISPENSED
Start: 2024-07-22

## (undated) RX ORDER — DEXAMETHASONE SODIUM PHOSPHATE 4 MG/ML
INJECTION, SOLUTION INTRA-ARTICULAR; INTRALESIONAL; INTRAMUSCULAR; INTRAVENOUS; SOFT TISSUE
Status: DISPENSED
Start: 2024-07-22

## (undated) RX ORDER — FENTANYL CITRATE 0.05 MG/ML
INJECTION, SOLUTION INTRAMUSCULAR; INTRAVENOUS
Status: DISPENSED
Start: 2024-07-24

## (undated) RX ORDER — BUPIVACAINE HYDROCHLORIDE 5 MG/ML
INJECTION, SOLUTION EPIDURAL; INTRACAUDAL
Status: DISPENSED
Start: 2024-07-24

## (undated) RX ORDER — HEPARIN SODIUM 1000 [USP'U]/ML
INJECTION, SOLUTION INTRAVENOUS; SUBCUTANEOUS
Status: DISPENSED
Start: 2024-07-21

## (undated) RX ORDER — ONDANSETRON 2 MG/ML
INJECTION INTRAMUSCULAR; INTRAVENOUS
Status: DISPENSED
Start: 2024-07-21

## (undated) RX ORDER — BUPIVACAINE HYDROCHLORIDE 5 MG/ML
INJECTION, SOLUTION EPIDURAL; INTRACAUDAL; PERINEURAL
Status: DISPENSED
Start: 2025-04-17

## (undated) RX ORDER — ACETAMINOPHEN 325 MG/1
TABLET ORAL
Status: DISPENSED
Start: 2025-04-17

## (undated) RX ORDER — METRONIDAZOLE 500 MG/100ML
INJECTION, SOLUTION INTRAVENOUS
Status: DISPENSED
Start: 2025-04-17

## (undated) RX ORDER — DEXAMETHASONE SODIUM PHOSPHATE 4 MG/ML
INJECTION, SOLUTION INTRA-ARTICULAR; INTRALESIONAL; INTRAMUSCULAR; INTRAVENOUS; SOFT TISSUE
Status: DISPENSED
Start: 2024-07-21

## (undated) RX ORDER — PROPOFOL 10 MG/ML
INJECTION, EMULSION INTRAVENOUS
Status: DISPENSED
Start: 2024-07-24

## (undated) RX ORDER — DEXAMETHASONE SODIUM PHOSPHATE 4 MG/ML
INJECTION, SOLUTION INTRA-ARTICULAR; INTRALESIONAL; INTRAMUSCULAR; INTRAVENOUS; SOFT TISSUE
Status: DISPENSED
Start: 2025-04-17

## (undated) RX ORDER — GLYCOPYRROLATE 0.2 MG/ML
INJECTION, SOLUTION INTRAMUSCULAR; INTRAVENOUS
Status: DISPENSED
Start: 2025-04-17

## (undated) RX ORDER — FENTANYL CITRATE 50 UG/ML
INJECTION, SOLUTION INTRAMUSCULAR; INTRAVENOUS
Status: DISPENSED
Start: 2024-07-22

## (undated) RX ORDER — FENTANYL CITRATE 50 UG/ML
INJECTION, SOLUTION INTRAMUSCULAR; INTRAVENOUS
Status: DISPENSED
Start: 2025-04-17